# Patient Record
Sex: FEMALE | Race: WHITE | NOT HISPANIC OR LATINO | URBAN - METROPOLITAN AREA
[De-identification: names, ages, dates, MRNs, and addresses within clinical notes are randomized per-mention and may not be internally consistent; named-entity substitution may affect disease eponyms.]

---

## 2017-01-16 VITALS
SYSTOLIC BLOOD PRESSURE: 168 MMHG | WEIGHT: 197.53 LBS | RESPIRATION RATE: 16 BRPM | DIASTOLIC BLOOD PRESSURE: 77 MMHG | OXYGEN SATURATION: 96 % | HEART RATE: 66 BPM | HEIGHT: 64.5 IN | TEMPERATURE: 98 F

## 2017-01-16 NOTE — ASU PATIENT PROFILE, ADULT - TEACHING/LEARNING LEARNING PREFERENCES
verbal instruction/individual instruction verbal instruction/individual instruction/written material

## 2017-01-17 ENCOUNTER — INPATIENT (INPATIENT)
Facility: HOSPITAL | Age: 77
LOS: 5 days | Discharge: HOME | DRG: 460 | End: 2017-01-23
Payer: COMMERCIAL

## 2017-01-17 ENCOUNTER — TRANSCRIPTION ENCOUNTER (OUTPATIENT)
Age: 77
End: 2017-01-17

## 2017-01-17 DIAGNOSIS — Z98.890 OTHER SPECIFIED POSTPROCEDURAL STATES: Chronic | ICD-10-CM

## 2017-01-17 LAB
ANION GAP SERPL CALC-SCNC: 3 MMOL/L — LOW (ref 9–16)
BASE EXCESS BLDA CALC-SCNC: 1.5 MMOL/L — SIGNIFICANT CHANGE UP (ref -2–3)
BASOPHILS NFR BLD AUTO: 0.1 % — SIGNIFICANT CHANGE UP (ref 0–2)
BUN SERPL-MCNC: 13 MG/DL — SIGNIFICANT CHANGE UP (ref 7–23)
CA-I BLDA-SCNC: 1.13 MMOL/L — SIGNIFICANT CHANGE UP (ref 1.1–1.3)
CALCIUM SERPL-MCNC: 8 MG/DL — LOW (ref 8.5–10.5)
CHLORIDE SERPL-SCNC: 106 MMOL/L — SIGNIFICANT CHANGE UP (ref 96–108)
CO2 SERPL-SCNC: 31 MMOL/L — SIGNIFICANT CHANGE UP (ref 22–31)
COHGB MFR BLDA: 0.7 % — SIGNIFICANT CHANGE UP
CREAT SERPL-MCNC: 0.48 MG/DL — LOW (ref 0.5–1.3)
EOSINOPHIL NFR BLD AUTO: 0.5 % — SIGNIFICANT CHANGE UP (ref 0–6)
GAS PNL BLDA: SIGNIFICANT CHANGE UP
GLUCOSE SERPL-MCNC: 118 MG/DL — HIGH (ref 70–99)
HCO3 BLDA-SCNC: 25 MMOL/L — SIGNIFICANT CHANGE UP (ref 21–28)
HCT VFR BLD CALC: 28.4 % — LOW (ref 34.5–45)
HCT VFR BLD CALC: 28.4 % — LOW (ref 34.5–45)
HGB BLD-MCNC: 9.2 G/DL — LOW (ref 11.5–15.5)
HGB BLD-MCNC: 9.3 G/DL — LOW (ref 11.5–15.5)
HGB BLDA-MCNC: 11.6 G/DL — SIGNIFICANT CHANGE UP (ref 11.5–15.5)
LYMPHOCYTES # BLD AUTO: 18.3 % — SIGNIFICANT CHANGE UP (ref 13–44)
MCHC RBC-ENTMCNC: 26.5 PG — LOW (ref 27–34)
MCHC RBC-ENTMCNC: 26.7 PG — LOW (ref 27–34)
MCHC RBC-ENTMCNC: 32.4 G/DL — SIGNIFICANT CHANGE UP (ref 32–36)
MCHC RBC-ENTMCNC: 32.7 G/DL — SIGNIFICANT CHANGE UP (ref 32–36)
MCV RBC AUTO: 81.6 FL — SIGNIFICANT CHANGE UP (ref 80–100)
MCV RBC AUTO: 81.8 FL — SIGNIFICANT CHANGE UP (ref 80–100)
METHGB MFR BLDA: 0.1 % — SIGNIFICANT CHANGE UP
MONOCYTES NFR BLD AUTO: 7.9 % — SIGNIFICANT CHANGE UP (ref 2–14)
NEUTROPHILS NFR BLD AUTO: 73.2 % — SIGNIFICANT CHANGE UP (ref 43–77)
O2 CT VFR BLDA CALC: 16.3 ML/DL — SIGNIFICANT CHANGE UP (ref 15–23)
OXYHGB MFR BLDA: 98 % — SIGNIFICANT CHANGE UP (ref 94–100)
PCO2 BLDA: 36 MMHG — SIGNIFICANT CHANGE UP (ref 32–45)
PH BLDA: 7.46 — HIGH (ref 7.35–7.45)
PLATELET # BLD AUTO: 207 K/UL — SIGNIFICANT CHANGE UP (ref 150–400)
PLATELET # BLD AUTO: 209 K/UL — SIGNIFICANT CHANGE UP (ref 150–400)
PO2 BLDA: 150 MMHG — HIGH (ref 83–108)
POTASSIUM BLDA-SCNC: 3.6 MMOL/L — SIGNIFICANT CHANGE UP (ref 3.5–4.9)
POTASSIUM SERPL-MCNC: 3.7 MMOL/L — SIGNIFICANT CHANGE UP (ref 3.5–5.3)
POTASSIUM SERPL-SCNC: 3.7 MMOL/L — SIGNIFICANT CHANGE UP (ref 3.5–5.3)
RBC # BLD: 3.47 M/UL — LOW (ref 3.8–5.2)
RBC # BLD: 3.48 M/UL — LOW (ref 3.8–5.2)
RBC # FLD: 14.6 % — SIGNIFICANT CHANGE UP (ref 10.3–16.9)
RBC # FLD: 15.2 % — SIGNIFICANT CHANGE UP (ref 10.3–16.9)
SAO2 % BLDA: 99 % — SIGNIFICANT CHANGE UP (ref 95–100)
SODIUM BLDA-SCNC: 137 MMOL/L — LOW (ref 138–146)
SODIUM SERPL-SCNC: 140 MMOL/L — SIGNIFICANT CHANGE UP (ref 135–145)
WBC # BLD: 6.7 K/UL — SIGNIFICANT CHANGE UP (ref 3.8–10.5)
WBC # BLD: 9.2 K/UL — SIGNIFICANT CHANGE UP (ref 3.8–10.5)
WBC # FLD AUTO: 6.7 K/UL — SIGNIFICANT CHANGE UP (ref 3.8–10.5)
WBC # FLD AUTO: 9.2 K/UL — SIGNIFICANT CHANGE UP (ref 3.8–10.5)

## 2017-01-17 RX ORDER — HYDROMORPHONE HYDROCHLORIDE 2 MG/ML
1 INJECTION INTRAMUSCULAR; INTRAVENOUS; SUBCUTANEOUS EVERY 4 HOURS
Qty: 0 | Refills: 0 | Status: DISCONTINUED | OUTPATIENT
Start: 2017-01-17 | End: 2017-01-17

## 2017-01-17 RX ORDER — DOCUSATE SODIUM 100 MG
100 CAPSULE ORAL THREE TIMES A DAY
Qty: 0 | Refills: 0 | Status: DISCONTINUED | OUTPATIENT
Start: 2017-01-17 | End: 2017-01-23

## 2017-01-17 RX ORDER — ONDANSETRON 8 MG/1
4 TABLET, FILM COATED ORAL EVERY 4 HOURS
Qty: 0 | Refills: 0 | Status: DISCONTINUED | OUTPATIENT
Start: 2017-01-17 | End: 2017-01-18

## 2017-01-17 RX ORDER — SENNA PLUS 8.6 MG/1
2 TABLET ORAL AT BEDTIME
Qty: 0 | Refills: 0 | Status: DISCONTINUED | OUTPATIENT
Start: 2017-01-17 | End: 2017-01-23

## 2017-01-17 RX ORDER — SODIUM CHLORIDE 9 MG/ML
1000 INJECTION, SOLUTION INTRAVENOUS
Qty: 0 | Refills: 0 | Status: DISCONTINUED | OUTPATIENT
Start: 2017-01-17 | End: 2017-01-23

## 2017-01-17 RX ORDER — CEFAZOLIN SODIUM 1 G
2000 VIAL (EA) INJECTION EVERY 8 HOURS
Qty: 0 | Refills: 0 | Status: COMPLETED | OUTPATIENT
Start: 2017-01-17 | End: 2017-01-18

## 2017-01-17 RX ORDER — METOPROLOL TARTRATE 50 MG
5 TABLET ORAL DAILY
Qty: 0 | Refills: 0 | Status: DISCONTINUED | OUTPATIENT
Start: 2017-01-17 | End: 2017-01-17

## 2017-01-17 RX ORDER — MAGNESIUM HYDROXIDE 400 MG/1
30 TABLET, CHEWABLE ORAL EVERY 12 HOURS
Qty: 0 | Refills: 0 | Status: DISCONTINUED | OUTPATIENT
Start: 2017-01-17 | End: 2017-01-23

## 2017-01-17 RX ORDER — NEBIVOLOL HYDROCHLORIDE 5 MG/1
5 TABLET ORAL DAILY
Qty: 0 | Refills: 0 | Status: DISCONTINUED | OUTPATIENT
Start: 2017-01-17 | End: 2017-01-23

## 2017-01-17 RX ORDER — HYDROMORPHONE HYDROCHLORIDE 2 MG/ML
0.5 INJECTION INTRAMUSCULAR; INTRAVENOUS; SUBCUTANEOUS
Qty: 0 | Refills: 0 | Status: DISCONTINUED | OUTPATIENT
Start: 2017-01-17 | End: 2017-01-23

## 2017-01-17 RX ORDER — HYDROMORPHONE HYDROCHLORIDE 2 MG/ML
0.5 INJECTION INTRAMUSCULAR; INTRAVENOUS; SUBCUTANEOUS
Qty: 0 | Refills: 0 | Status: COMPLETED | OUTPATIENT
Start: 2017-01-17 | End: 2017-01-17

## 2017-01-17 RX ADMIN — HYDROMORPHONE HYDROCHLORIDE 0.5 MILLIGRAM(S): 2 INJECTION INTRAMUSCULAR; INTRAVENOUS; SUBCUTANEOUS at 21:48

## 2017-01-17 RX ADMIN — HYDROMORPHONE HYDROCHLORIDE 0.5 MILLIGRAM(S): 2 INJECTION INTRAMUSCULAR; INTRAVENOUS; SUBCUTANEOUS at 22:00

## 2017-01-17 RX ADMIN — Medication 100 MILLIGRAM(S): at 21:09

## 2017-01-17 NOTE — DISCHARGE NOTE ADULT - ADDITIONAL INSTRUCTIONS
No strenuous activity, heavy lifting, driving, tub bathing, or returning to work until cleared by MD.  You may shower  Change dressing daily.  Remove dressing after post op day 5, then leave incision open to air.  Follow up with Dr. Mayo in his office in 2 weeks.  Any staples/sutures will be removed in 2 weeks.   If you don't have a bowel movement by post op day 3, then take Milk of Magnesia (over the counter).  If no bowel movement by at least post op day 5, then use a Dulcolax suppository (over the counter) and/or a Fleets enema--if still no bowel movement, call your MD.  Contact your doctor if you experience: fever greater than 101.5, chills, chest pain, difficulty breathing, bleeding, redness or heat around the incision.    Please follow up with your primary care provider.

## 2017-01-17 NOTE — DISCHARGE NOTE ADULT - HOSPITAL COURSE
Admitted  Surgery -   Perioperative abx  Pain control  DVT ppx Admitted  Surgery - 1/17/17 L3 to L5 lami PSF  Perioperative abx  Pain control  DVT ppx Admitted 1/17/17  Surgery - 1/17/17 L3 to L5 lami PSF  Perioperative abx  Pain control  DVT ppx

## 2017-01-17 NOTE — DISCHARGE NOTE ADULT - CARE PLAN
Principal Discharge DX:	Spinal stenosis  Goal:	improvement after surgery  Instructions for follow-up, activity and diet:	see below Principal Discharge DX:	Spinal stenosis of lumbar region  Goal:	improvement after surgery 1/17/16- L3-L5 laminectomy, posterior spinal fusion, L3 Kyphoplasty  Instructions for follow-up, activity and diet:	see below

## 2017-01-17 NOTE — DISCHARGE NOTE ADULT - NS AS ACTIVITY OBS
Do not make important decisions/Showering allowed/No Heavy lifting/straining/Do not drive or operate machinery

## 2017-01-17 NOTE — DISCHARGE NOTE ADULT - PATIENT PORTAL LINK FT
“You can access the [employer] Patient Portal, offered by [facility], by registering with the following website: [email or web address]”

## 2017-01-17 NOTE — DISCHARGE NOTE ADULT - MEDICATION SUMMARY - MEDICATIONS TO TAKE
I will START or STAY ON the medications listed below when I get home from the hospital:    acetaminophen-oxyCODONE 325 mg-5 mg oral tablet  -- 1 to 2 tab(s) by mouth every 4 to 6 hours, as needed, pain MDD:8  -- Caution federal law prohibits the transfer of this drug to any person other  than the person for whom it was prescribed.  May cause drowsiness.  Alcohol may intensify this effect.  Use care when operating dangerous machinery.  This prescription cannot be refilled.  This product contains acetaminophen.  Do not use  with any other product containing acetaminophen to prevent possible liver damage.  Using more of this medication than prescribed may cause serious breathing problems.    -- Indication: For moderate to severe pain    Bystolic 5 mg oral tablet  -- 1 tab(s) by mouth once a day  -- Indication: For HTN (hypertension)    docusate sodium 100 mg oral capsule  -- 1 cap(s) by mouth 3 times a day  -- Indication: For constipation    senna oral tablet  -- 2 tab(s) by mouth once a day (at bedtime)  -- Indication: For constipation

## 2017-01-17 NOTE — DISCHARGE NOTE ADULT - CARE PROVIDER_API CALL
Wily Mayo), Orthopaedic Surgery  [address] [place] [address]  Phone: [phone]  Fax: [phone]

## 2017-01-17 NOTE — DISCHARGE NOTE ADULT - PLAN OF CARE
improvement after surgery see below improvement after surgery 1/17/16- L3-L5 laminectomy, posterior spinal fusion, L3 Kyphoplasty

## 2017-01-18 DIAGNOSIS — M48.06 SPINAL STENOSIS, LUMBAR REGION: ICD-10-CM

## 2017-01-18 LAB
ANION GAP SERPL CALC-SCNC: 4 MMOL/L — LOW (ref 9–16)
BASOPHILS NFR BLD AUTO: 0.1 % — SIGNIFICANT CHANGE UP (ref 0–2)
BUN SERPL-MCNC: 12 MG/DL — SIGNIFICANT CHANGE UP (ref 7–23)
CALCIUM SERPL-MCNC: 7.9 MG/DL — LOW (ref 8.5–10.5)
CHLORIDE SERPL-SCNC: 103 MMOL/L — SIGNIFICANT CHANGE UP (ref 96–108)
CO2 SERPL-SCNC: 31 MMOL/L — SIGNIFICANT CHANGE UP (ref 22–31)
CREAT SERPL-MCNC: 0.45 MG/DL — LOW (ref 0.5–1.3)
EOSINOPHIL NFR BLD AUTO: 0.1 % — SIGNIFICANT CHANGE UP (ref 0–6)
GLUCOSE SERPL-MCNC: 148 MG/DL — HIGH (ref 70–99)
HCT VFR BLD CALC: 28.2 % — LOW (ref 34.5–45)
HGB BLD-MCNC: 9.1 G/DL — LOW (ref 11.5–15.5)
LYMPHOCYTES # BLD AUTO: 8.7 % — LOW (ref 13–44)
MCHC RBC-ENTMCNC: 26.8 PG — LOW (ref 27–34)
MCHC RBC-ENTMCNC: 32.3 G/DL — SIGNIFICANT CHANGE UP (ref 32–36)
MCV RBC AUTO: 82.9 FL — SIGNIFICANT CHANGE UP (ref 80–100)
MONOCYTES NFR BLD AUTO: 7.7 % — SIGNIFICANT CHANGE UP (ref 2–14)
NEUTROPHILS NFR BLD AUTO: 83.4 % — HIGH (ref 43–77)
PLATELET # BLD AUTO: 193 K/UL — SIGNIFICANT CHANGE UP (ref 150–400)
POTASSIUM SERPL-MCNC: 4.1 MMOL/L — SIGNIFICANT CHANGE UP (ref 3.5–5.3)
POTASSIUM SERPL-SCNC: 4.1 MMOL/L — SIGNIFICANT CHANGE UP (ref 3.5–5.3)
RBC # BLD: 3.4 M/UL — LOW (ref 3.8–5.2)
RBC # FLD: 15.1 % — SIGNIFICANT CHANGE UP (ref 10.3–16.9)
SODIUM SERPL-SCNC: 138 MMOL/L — SIGNIFICANT CHANGE UP (ref 135–145)
WBC # BLD: 10.5 K/UL — SIGNIFICANT CHANGE UP (ref 3.8–10.5)
WBC # FLD AUTO: 10.5 K/UL — SIGNIFICANT CHANGE UP (ref 3.8–10.5)

## 2017-01-18 RX ORDER — METOCLOPRAMIDE HCL 10 MG
10 TABLET ORAL EVERY 8 HOURS
Qty: 0 | Refills: 0 | Status: DISCONTINUED | OUTPATIENT
Start: 2017-01-18 | End: 2017-01-23

## 2017-01-18 RX ADMIN — Medication 100 MILLIGRAM(S): at 09:00

## 2017-01-18 RX ADMIN — Medication 100 MILLIGRAM(S): at 21:21

## 2017-01-18 RX ADMIN — HYDROMORPHONE HYDROCHLORIDE 0.5 MILLIGRAM(S): 2 INJECTION INTRAMUSCULAR; INTRAVENOUS; SUBCUTANEOUS at 02:33

## 2017-01-18 RX ADMIN — Medication 100 MILLIGRAM(S): at 14:10

## 2017-01-18 RX ADMIN — NEBIVOLOL HYDROCHLORIDE 5 MILLIGRAM(S): 5 TABLET ORAL at 06:24

## 2017-01-18 RX ADMIN — HYDROMORPHONE HYDROCHLORIDE 0.5 MILLIGRAM(S): 2 INJECTION INTRAMUSCULAR; INTRAVENOUS; SUBCUTANEOUS at 02:23

## 2017-01-18 RX ADMIN — Medication 100 MILLIGRAM(S): at 05:00

## 2017-01-18 RX ADMIN — SENNA PLUS 2 TABLET(S): 8.6 TABLET ORAL at 21:21

## 2017-01-18 NOTE — PROGRESS NOTE ADULT - SUBJECTIVE AND OBJECTIVE BOX
Patient seen and examined at bedside.     SUBJECTIVE: No acute events overnight.  Pain tolerable.    Denies Chest Pain/SOB.    Denies Headache.    Denies Nausea/vomiting.      OBJECTIVE:   T(C): 36.8, Max: 36.9 (01-17 @ 16:30)  T(F): 98.2, Max: 98.5 (01-17 @ 16:30)  HR: 68 (56 - 73)  BP: 133/62 (112/49 - 175/62)  RR:  (12 - 18)  SpO2:  (95% - 100%)  Wt(kg): --    NAD, non labored respirations  Affected extremity:          Dressing: clean/dry/intact          Drains: none         Sensation: [x ] intact to light touch  [ ] decreased                              Vascular: [x ] warm well perfused; capillary refill <3 seconds          Motor exam: [x ]         [ ] Upper extremity                   Bhavya (c5)   Bi(c5/6)   WE(c6)   EE(c7)    (c8)                                                R           5              5            5             5             5                                               L            5              5            5             5            5         [x ] Lower extremity                   IP(L2)     Q(L3)     TA(L4)     EHL(L5)     GS(s1)                                                 R          5           5          5            5              5                                               L           5           5         5             5              5                                     9.1<L>  10.5  )-------------------( 193      ( 01-18 @ 03:21 )                 28.2<L>    I&O's Detail      A/P :  76y Female s/p Lumbar Laminectomy/PSF L3-4, L4-5 & L3 Kyphoplasty     -    Pain control + bowel regimen  -    DVT ppx: SCDs    -    Periop abx    -    ADAT  -    Check AM labs  -    Weight bearing status:   WBAT        -    Physical Therapy  -    Resume home meds  -    Dispo planning

## 2017-01-18 NOTE — PHYSICAL THERAPY INITIAL EVALUATION ADULT - PLANNED THERAPY INTERVENTIONS, PT EVAL
bed mobility training/gait training/strengthening/balance training/lumbar stabilization/transfer training

## 2017-01-18 NOTE — H&P ADULT. - HISTORY OF PRESENT ILLNESS
76F  c/o back pain worsened over time without improvement. Denies numbness, tingling. Ambulates without assist. Presents today for elective PSF L3-5.

## 2017-01-18 NOTE — PROGRESS NOTE ADULT - SUBJECTIVE AND OBJECTIVE BOX
ORTHO NOTE    [X ] Pt seen/examined.  [ ] Pt without any complaints/in NAD.    [X ] Pt complains of:  Incisional pain - meds help.        ROS: [ ] Fever  [ ] Chills  [ ] CP [ ] SOB [ ] Dysnea  [ ] Palpitations [ ] Cough [ ] N/V/C/D [ ] Paresthia [ ] Other     [X ] ROS  otherwise negative    .    PHYSICAL EXAM:    Vital Signs Last 24 Hrs  T(C): 36.8, Max: 36.9 (01-17 @ 16:30)  T(F): 98.2, Max: 98.5 (01-17 @ 16:30)  HR: 68 (56 - 73)  BP: 133/62 (112/49 - 175/62)  BP(mean): --  RR: 15 (12 - 18)  SpO2: 99% (95% - 100%)    I&O's Detail       CAPILLARY BLOOD GLUCOSE  98 (17 Jan 2017 15:37)                  Neuro:    Lungs:    CV:    ABD:     Ext:    LABS                        9.1    10.5  )-----------( 193      ( 18 Jan 2017 03:21 )             28.2                                18 Jan 2017 03:21    138    |  103    |  12     ----------------------------<  148    4.1     |  31     |  0.45     Ca    7.9        18 Jan 2017 03:21        [ ] Other Labs  [ ] None ordered            Please check or [place] when present:  •  Heart Failure:    [ ] Acute        [ ]  Acute on Chronic        [ ] Chronic         [ ] Diastolic     [ ]  Combined    •  KARLA:     [ ] ATN        [ ]  Renal medullary necrosis       [ ]  Renal cortical necrosis                  [ ] Other pathological Lesion:  •  CKD:  [ ] Stage I   [ ] Stage II  [ ] Stage III    [ ]Stage IV   [ ]  CKD V   [ ]  Other/Unspecified:    •  Abdominal Nutritional Status:   [ ] Malnutrition-See Nutrition note    [ ] Cachexia   [ ]  Other        [ ] Supplement ordered:            [ ] Morbid Obesity: BMI >=40         ASSESSMENT/PLAN:      STATUS POST: L3 to L5 lami psf pod 1    CONTINUE:          [ ] PT wbat    [ ] DVT PPX- scd    [ ] Pain Mgt con't current regimen    [ ] Dispo plan- pending PT recs ORTHO NOTE    [X ] Pt seen/examined in PACU at 8:45 AM.  [ ] Pt without any complaints/in NAD.    [X ] Pt complains of:  Incisional pain - meds help.  Legs feel okay.       ROS: [ ] Fever  [ ] Chills  [ ] CP [ ] SOB [ ] Dysnea  [ ] Palpitations [ ] Cough [ ] N/V/C/D [ ] Paresthia [ ] Other     [X ] ROS  otherwise negative    .    PHYSICAL EXAM:    Vital Signs Last 24 Hrs  T(C): 36.8, Max: 36.9 (01-17 @ 16:30)  T(F): 98.2, Max: 98.5 (01-17 @ 16:30)  HR: 68 (56 - 73)  BP: 133/62 (112/49 - 175/62)  BP(mean): --  RR: 15 (12 - 18)  SpO2: 99% (95% - 100%)    I&O's Detail       CAPILLARY BLOOD GLUCOSE  98 (17 Jan 2017 15:37)                  Neuro:  NAD A and O x 3    Lungs:    CV:    ABD: soft nt/nd  + BS    Ext:  LE strength 5/5 nvid b/l    2 HV in place, + Roy    LABS                        9.1    10.5  )-----------( 193      ( 18 Jan 2017 03:21 )             28.2                                18 Jan 2017 03:21    138    |  103    |  12     ----------------------------<  148    4.1     |  31     |  0.45     Ca    7.9        18 Jan 2017 03:21        [ ] Other Labs  [ ] None ordered            Please check or [place] when present:  •  Heart Failure:    [ ] Acute        [ ]  Acute on Chronic        [ ] Chronic         [ ] Diastolic     [ ]  Combined    •  KARLA:     [ ] ATN        [ ]  Renal medullary necrosis       [ ]  Renal cortical necrosis                  [ ] Other pathological Lesion:  •  CKD:  [ ] Stage I   [ ] Stage II  [ ] Stage III    [ ]Stage IV   [ ]  CKD V   [ ]  Other/Unspecified:    •  Abdominal Nutritional Status:   [ ] Malnutrition-See Nutrition note    [ ] Cachexia   [ ]  Other        [ ] Supplement ordered:            [ ] Morbid Obesity: BMI >=40         ASSESSMENT/PLAN:      STATUS POST: L3 to L5 lami psf pod 1    CONTINUE:          [ ] PT wbat    [ ] DVT PPX- scd    [ ] Pain Mgt con't current regimen    [ ] Dispo plan- pending PT recs    Con't HVs.  Void trial.  Refuses to use CPAP for [place], didn't use it last night but there was no desat as per PACU RN.  IS use.  Bowel regimen.

## 2017-01-18 NOTE — PHYSICAL THERAPY INITIAL EVALUATION ADULT - ADDITIONAL COMMENTS
[place] with [place] access. Patient has a previous tolerance of ambulating 2 city blocks, before needing to rest. Patient does not ambulate with a AD.

## 2017-01-18 NOTE — PHYSICAL THERAPY INITIAL EVALUATION ADULT - IMPAIRMENTS FOUND, PT EVAL
aerobic capacity/endurance/gait, locomotion, and balance/muscle strength/ergonomics and body mechanics

## 2017-01-19 DIAGNOSIS — G47.30 SLEEP APNEA, UNSPECIFIED: ICD-10-CM

## 2017-01-19 DIAGNOSIS — D50.0 IRON DEFICIENCY ANEMIA SECONDARY TO BLOOD LOSS (CHRONIC): ICD-10-CM

## 2017-01-19 DIAGNOSIS — I10 ESSENTIAL (PRIMARY) HYPERTENSION: ICD-10-CM

## 2017-01-19 LAB
ANION GAP SERPL CALC-SCNC: 3 MMOL/L — LOW (ref 9–16)
APPEARANCE UR: CLEAR — SIGNIFICANT CHANGE UP
APPEARANCE UR: CLEAR — SIGNIFICANT CHANGE UP
BACTERIA # UR AUTO: PRESENT /HPF
BASOPHILS NFR BLD AUTO: 0.1 % — SIGNIFICANT CHANGE UP (ref 0–2)
BILIRUB UR-MCNC: NEGATIVE — SIGNIFICANT CHANGE UP
BILIRUB UR-MCNC: NEGATIVE — SIGNIFICANT CHANGE UP
BUN SERPL-MCNC: 9 MG/DL — SIGNIFICANT CHANGE UP (ref 7–23)
CALCIUM SERPL-MCNC: 8.4 MG/DL — LOW (ref 8.5–10.5)
CHLORIDE SERPL-SCNC: 101 MMOL/L — SIGNIFICANT CHANGE UP (ref 96–108)
CO2 SERPL-SCNC: 33 MMOL/L — HIGH (ref 22–31)
COLOR SPEC: YELLOW — SIGNIFICANT CHANGE UP
COLOR SPEC: YELLOW — SIGNIFICANT CHANGE UP
CREAT SERPL-MCNC: 0.48 MG/DL — LOW (ref 0.5–1.3)
DIFF PNL FLD: (no result)
DIFF PNL FLD: NEGATIVE — SIGNIFICANT CHANGE UP
EOSINOPHIL NFR BLD AUTO: 0.4 % — SIGNIFICANT CHANGE UP (ref 0–6)
EPI CELLS # UR: SIGNIFICANT CHANGE UP /HPF
GLUCOSE SERPL-MCNC: 126 MG/DL — HIGH (ref 70–99)
GLUCOSE UR QL: NEGATIVE — SIGNIFICANT CHANGE UP
GLUCOSE UR QL: NEGATIVE — SIGNIFICANT CHANGE UP
HCT VFR BLD CALC: 25.1 % — LOW (ref 34.5–45)
HCT VFR BLD CALC: 26.2 % — LOW (ref 34.5–45)
HGB BLD-MCNC: 8 G/DL — LOW (ref 11.5–15.5)
HGB BLD-MCNC: 8.2 G/DL — LOW (ref 11.5–15.5)
KETONES UR-MCNC: (no result) MG/DL
KETONES UR-MCNC: NEGATIVE — SIGNIFICANT CHANGE UP
LEUKOCYTE ESTERASE UR-ACNC: NEGATIVE — SIGNIFICANT CHANGE UP
LEUKOCYTE ESTERASE UR-ACNC: NEGATIVE — SIGNIFICANT CHANGE UP
LYMPHOCYTES # BLD AUTO: 6.4 % — LOW (ref 13–44)
MAGNESIUM SERPL-MCNC: 1.9 MG/DL — SIGNIFICANT CHANGE UP (ref 1.6–2.4)
MCHC RBC-ENTMCNC: 26.1 PG — LOW (ref 27–34)
MCHC RBC-ENTMCNC: 26.6 PG — LOW (ref 27–34)
MCHC RBC-ENTMCNC: 31.3 G/DL — LOW (ref 32–36)
MCHC RBC-ENTMCNC: 31.9 G/DL — LOW (ref 32–36)
MCV RBC AUTO: 83.4 FL — SIGNIFICANT CHANGE UP (ref 80–100)
MCV RBC AUTO: 83.4 FL — SIGNIFICANT CHANGE UP (ref 80–100)
MONOCYTES NFR BLD AUTO: 9.3 % — SIGNIFICANT CHANGE UP (ref 2–14)
NEUTROPHILS NFR BLD AUTO: 83.8 % — HIGH (ref 43–77)
NITRITE UR-MCNC: NEGATIVE — SIGNIFICANT CHANGE UP
NITRITE UR-MCNC: NEGATIVE — SIGNIFICANT CHANGE UP
PH UR: 5.5 — SIGNIFICANT CHANGE UP (ref 4–8)
PH UR: 6 — SIGNIFICANT CHANGE UP (ref 4–8)
PLATELET # BLD AUTO: 167 K/UL — SIGNIFICANT CHANGE UP (ref 150–400)
PLATELET # BLD AUTO: 183 K/UL — SIGNIFICANT CHANGE UP (ref 150–400)
POTASSIUM SERPL-MCNC: 3.7 MMOL/L — SIGNIFICANT CHANGE UP (ref 3.5–5.3)
POTASSIUM SERPL-SCNC: 3.7 MMOL/L — SIGNIFICANT CHANGE UP (ref 3.5–5.3)
PROT UR-MCNC: NEGATIVE MG/DL — SIGNIFICANT CHANGE UP
PROT UR-MCNC: NEGATIVE MG/DL — SIGNIFICANT CHANGE UP
RBC # BLD: 3.01 M/UL — LOW (ref 3.8–5.2)
RBC # BLD: 3.14 M/UL — LOW (ref 3.8–5.2)
RBC # FLD: 15.3 % — SIGNIFICANT CHANGE UP (ref 10.3–16.9)
RBC # FLD: 15.3 % — SIGNIFICANT CHANGE UP (ref 10.3–16.9)
RBC CASTS # UR COMP ASSIST: > 10 /HPF
SODIUM SERPL-SCNC: 137 MMOL/L — SIGNIFICANT CHANGE UP (ref 135–145)
SP GR SPEC: 1.02 — SIGNIFICANT CHANGE UP (ref 1–1.03)
SP GR SPEC: 1.02 — SIGNIFICANT CHANGE UP (ref 1–1.03)
UROBILINOGEN FLD QL: 0.2 E.U./DL — SIGNIFICANT CHANGE UP
UROBILINOGEN FLD QL: 0.2 E.U./DL — SIGNIFICANT CHANGE UP
WBC # BLD: 10.1 K/UL — SIGNIFICANT CHANGE UP (ref 3.8–10.5)
WBC # BLD: 9.8 K/UL — SIGNIFICANT CHANGE UP (ref 3.8–10.5)
WBC # FLD AUTO: 10.1 K/UL — SIGNIFICANT CHANGE UP (ref 3.8–10.5)
WBC # FLD AUTO: 9.8 K/UL — SIGNIFICANT CHANGE UP (ref 3.8–10.5)
WBC UR QL: < 5 /HPF — SIGNIFICANT CHANGE UP

## 2017-01-19 PROCEDURE — 71010: CPT | Mod: 26

## 2017-01-19 PROCEDURE — 99222 1ST HOSP IP/OBS MODERATE 55: CPT | Mod: GC

## 2017-01-19 RX ADMIN — Medication 100 MILLIGRAM(S): at 22:45

## 2017-01-19 RX ADMIN — Medication 100 MILLIGRAM(S): at 14:21

## 2017-01-19 RX ADMIN — SENNA PLUS 2 TABLET(S): 8.6 TABLET ORAL at 22:45

## 2017-01-19 RX ADMIN — Medication 100 MILLIGRAM(S): at 06:13

## 2017-01-19 RX ADMIN — NEBIVOLOL HYDROCHLORIDE 5 MILLIGRAM(S): 5 TABLET ORAL at 06:14

## 2017-01-19 NOTE — PROGRESS NOTE ADULT - SUBJECTIVE AND OBJECTIVE BOX
ORTHO NOTE    [X ] Pt seen/examined.  [ ] Pt without any complaints/in NAD.    [X ] Pt complains of:  Incisional pain.  Did better with PT today.  O2 desat at times to 89% on RA, came up to 96% with n/c and IS use.      ROS: [ ] Fever  [ ] Chills  [ ] CP [ ] SOB [ ] Dysnea  [ ] Palpitations [ ] Cough [ ] N/V/C/D [ ] Paresthia [ ] Other     [X ] ROS  otherwise negative    .    PHYSICAL EXAM:    Vital Signs Last 24 Hrs  T(C): 36.6, Max: 36.9 (01-19 @ 05:34)  T(F): 97.9, Max: 98.5 (01-19 @ 05:34)  HR: 83 (72 - 83)  BP: 134/78 (134/78 - 144/62)  BP(mean): --  RR: 15 (15 - 17)  SpO2: 96% (95% - 100%)    I&O's Detail       CAPILLARY BLOOD GLUCOSE                  Neuro:  NAD A and O x 3    Lungs: CTA b/l    CV: RRR    ABD: soft nt/nd +BS    Ext:  LE strength 5/5 nvid b/l    Back dsg saturated, 2 HV in place    LABS                        8.0    10.1  )-----------( 167      ( 19 Jan 2017 11:39 )             25.1                                19 Jan 2017 08:30    137    |  101    |  9      ----------------------------<  126    3.7     |  33     |  0.48     Ca    8.4        19 Jan 2017 08:30  Mg     1.9       19 Jan 2017 08:30        [ ] Other Labs  [ ] None ordered            Please check or [place] when present:  •  Heart Failure:    [ ] Acute        [ ]  Acute on Chronic        [ ] Chronic         [ ] Diastolic     [ ]  Combined    •  KARLA:     [ ] ATN        [ ]  Renal medullary necrosis       [ ]  Renal cortical necrosis                  [ ] Other pathological Lesion:  •  CKD:  [ ] Stage I   [ ] Stage II  [ ] Stage III    [ ]Stage IV   [ ]  CKD V   [ ]  Other/Unspecified:    •  Abdominal Nutritional Status:   [ ] Malnutrition-See Nutrition note    [ ] Cachexia   [ ]  Other        [ ] Supplement ordered:            [ ] Morbid Obesity: BMI >=40         ASSESSMENT/PLAN:      STATUS POST: L3 to L5 lami PSF pod 2    CONTINUE:          [ ] PT wbat    [ ] DVT PPX- scd    [ ] Pain Mgt con't current regimen    [ ] Dispo plan- home     Did a dressing change.  Monitor h/h, running on lower end but asymptomatic with PT.  IS use.  Bowel regimen.  Con't HVs.  CXR showed small atelectasis L lung base.  O2 n/c prn.  Using cpap at night.  Dr Edouard to see for medicine.

## 2017-01-19 NOTE — CONSULT NOTE ADULT - PROBLEM SELECTOR RECOMMENDATION 9
she is doing well and she slept with the cpap and tolerated well.  The base line oxygen saturation is normal.  Avoid over sedation

## 2017-01-19 NOTE — PROGRESS NOTE ADULT - SUBJECTIVE AND OBJECTIVE BOX
Patient seen and examined at bedside.     SUBJECTIVE: No acute events overnight.  Pain tolerable.    Denies Chest Pain/SOB.    Denies Headache.    Denies Nausea/vomiting.      OBJECTIVE:   Vital Signs Last 24 Hrs  T(C): 36.9, Max: 36.9 (01-18 @ 08:52)  T(F): 98.5, Max: 98.5 (01-19 @ 05:34)  HR: 82 (59 - 82)  BP: 135/82 (118/66 - 150/56)  BP(mean): --  RR: 17 (12 - 17)  SpO2: 95% (95% - 100%)      NAD, non labored respirations  Affected extremity:          Dressing: clean/dry/intact          Drains: none         Sensation: [x ] intact to light touch  [ ] decreased                              Vascular: [x ] warm well perfused; capillary refill <3 seconds          Motor exam: [x ]         [ ] Upper extremity                   Bhavya (c5)   Bi(c5/6)   WE(c6)   EE(c7)    (c8)                                                R           5              5            5             5             5                                               L            5              5            5             5            5         [x ] Lower extremity                   IP(L2)     Q(L3)     TA(L4)     EHL(L5)     GS(s1)                                                 R          5           5          5            5              5                                               L           5           5         5             5              5                        I&O's Summary  I & Os for 24h ending 18 Jan 2017 07:00  =============================================  IN: 1100 ml / OUT: 1435 ml / NET: -335 ml    I & Os for current day (as of 19 Jan 2017 06:20)  =============================================  IN: 1340 ml / OUT: 1895 ml / NET: -555 ml        A/P :  76y Female s/p Lumbar Laminectomy/PSF L3-4, L4-5 & L3 Kyphoplasty 1/17/17  -    Pain control + bowel regimen  -    DVT ppx: SCDs    -    Periop abx    -    ADAT  -    Check AM labs  -    Weight bearing status:   WBAT        -    Physical Therapy  -    Resume home meds  -    Dispo planning

## 2017-01-20 LAB
ANION GAP SERPL CALC-SCNC: 6 MMOL/L — LOW (ref 9–16)
BASOPHILS NFR BLD AUTO: 0.1 % — SIGNIFICANT CHANGE UP (ref 0–2)
BUN SERPL-MCNC: 10 MG/DL — SIGNIFICANT CHANGE UP (ref 7–23)
CALCIUM SERPL-MCNC: 7.9 MG/DL — LOW (ref 8.5–10.5)
CHLORIDE SERPL-SCNC: 103 MMOL/L — SIGNIFICANT CHANGE UP (ref 96–108)
CO2 SERPL-SCNC: 33 MMOL/L — HIGH (ref 22–31)
CREAT SERPL-MCNC: 0.35 MG/DL — LOW (ref 0.5–1.3)
CULTURE RESULTS: NO GROWTH — SIGNIFICANT CHANGE UP
EOSINOPHIL NFR BLD AUTO: 0.8 % — SIGNIFICANT CHANGE UP (ref 0–6)
GLUCOSE SERPL-MCNC: 114 MG/DL — HIGH (ref 70–99)
HCT VFR BLD CALC: 25.3 % — LOW (ref 34.5–45)
HGB BLD-MCNC: 8.1 G/DL — LOW (ref 11.5–15.5)
LYMPHOCYTES # BLD AUTO: 10.6 % — LOW (ref 13–44)
MCHC RBC-ENTMCNC: 26.9 PG — LOW (ref 27–34)
MCHC RBC-ENTMCNC: 32 G/DL — SIGNIFICANT CHANGE UP (ref 32–36)
MCV RBC AUTO: 84.1 FL — SIGNIFICANT CHANGE UP (ref 80–100)
MONOCYTES NFR BLD AUTO: 11.4 % — SIGNIFICANT CHANGE UP (ref 2–14)
NEUTROPHILS NFR BLD AUTO: 77.1 % — HIGH (ref 43–77)
PLATELET # BLD AUTO: 156 K/UL — SIGNIFICANT CHANGE UP (ref 150–400)
POTASSIUM SERPL-MCNC: 3.5 MMOL/L — SIGNIFICANT CHANGE UP (ref 3.5–5.3)
POTASSIUM SERPL-SCNC: 3.5 MMOL/L — SIGNIFICANT CHANGE UP (ref 3.5–5.3)
RBC # BLD: 3.01 M/UL — LOW (ref 3.8–5.2)
RBC # FLD: 15.5 % — SIGNIFICANT CHANGE UP (ref 10.3–16.9)
SODIUM SERPL-SCNC: 142 MMOL/L — SIGNIFICANT CHANGE UP (ref 135–145)
SPECIMEN SOURCE: SIGNIFICANT CHANGE UP
WBC # BLD: 8.8 K/UL — SIGNIFICANT CHANGE UP (ref 3.8–10.5)
WBC # FLD AUTO: 8.8 K/UL — SIGNIFICANT CHANGE UP (ref 3.8–10.5)

## 2017-01-20 RX ADMIN — Medication 100 MILLIGRAM(S): at 15:39

## 2017-01-20 RX ADMIN — NEBIVOLOL HYDROCHLORIDE 5 MILLIGRAM(S): 5 TABLET ORAL at 05:11

## 2017-01-20 RX ADMIN — Medication 100 MILLIGRAM(S): at 05:11

## 2017-01-20 NOTE — DIETITIAN INITIAL EVALUATION ADULT. - OTHER INFO
77y/o F s/p L3-4-5 Laminectomy PSF, L3 Kyphoplasty. Pt with prior GB sx in 2002, but cannot recall wt loss. PTA pt was following a regular diet and not compliant with vitamin regimen. She c/o decreased appetite post-op due to pain and being uncomfortable. RN informed of current pain. Pt denies GI distress and mechanical issues. Discussed the importance of nutrition post-op and taking vitamins for prior bariatric surgery. Will follow and monitor meal intake and tolerance.

## 2017-01-20 NOTE — PROGRESS NOTE ADULT - SUBJECTIVE AND OBJECTIVE BOX
Patient seen and examined at bedside.     SUBJECTIVE: No acute events overnight.  Pain tolerable.    Denies Chest Pain/SOB.    Denies Headache.    Denies Nausea/vomiting.      OBJECTIVE:   Vital Signs Last 24 Hrs  T(C): 37.2, Max: 37.3 (01-19 @ 15:27)  T(F): 99, Max: 99.1 (01-19 @ 15:27)  HR: 74 (68 - 89)  BP: 149/80 (130/63 - 168/71)  BP(mean): --  RR: 16 (15 - 16)  SpO2: 98% (95% - 98%)      NAD, non labored respirations  Affected extremity:          Dressing: clean/dry/intact          Drains: none         Sensation: [x ] intact to light touch  [ ] decreased                              Vascular: [x ] warm well perfused; capillary refill <3 seconds          Motor exam: [x ]         [ ] Upper extremity                   Bhavya (c5)   Bi(c5/6)   WE(c6)   EE(c7)    (c8)                                                R           5              5            5             5             5                                               L            5              5            5             5            5         [x ] Lower extremity                   IP(L2)     Q(L3)     TA(L4)     EHL(L5)     GS(s1)                                                 R          5           5          5            5              5                                               L           5           5         5             5              5                          I&O's Summary  I & Os for 24h ending 20 Jan 2017 07:00  =============================================  IN: 900 ml / OUT: 830 ml / NET: 70 ml    I & Os for current day (as of 20 Jan 2017 08:47)  =============================================  IN: 0 ml / OUT: 150 ml / NET: -150 ml    Drains: 110/80, 20        A/P :  76y Female s/p Lumbar Laminectomy/PSF L3-4, L4-5 & L3 Kyphoplasty 1/17/17  -    Pain control + bowel regimen  -    DVT ppx: SCDs    -    Periop abx    -    ADAT  -    Check AM labs  -    Weight bearing status:   WBAT        -    Physical Therapy  -    Resume home meds  -    Dispo planning

## 2017-01-20 NOTE — PROGRESS NOTE ADULT - SUBJECTIVE AND OBJECTIVE BOX
ORTHO NOTE    [x ] Pt seen/examined. 9am  [ ] Pt without any complaints/in NAD.    [x ] Pt complains of: incisional pain with movements.     [ ] ROS  otherwise negative    .    PHYSICAL EXAM:    Vital Signs Last 24 Hrs  T(C): 36.8, Max: 37.3 (01-19 @ 15:27)  T(F): 98.3, Max: 99.1 (01-19 @ 15:27)  HR: 79 (68 - 89)  BP: 117/69 (117/69 - 168/71)  BP(mean): --  RR: 17 (16 - 17)  SpO2: 98% (95% - 98%)    I&O's Detail       CAPILLARY BLOOD GLUCOSE                  Neuro: A&0x3    Lungs: Denies SOB    CV: Denies chest pain    ABD: NON distended positive bowel sounds      Ext: NVID Dressing clean dry and intact. Drain x 2      LABS                        8.1    8.8   )-----------( 156      ( 20 Jan 2017 07:11 )             25.3                                20 Jan 2017 07:12    142    |  103    |  10     ----------------------------<  114    3.5     |  33     |  0.35     Ca    7.9        20 Jan 2017 07:12  Mg     1.9       19 Jan 2017 08:30        ASSESSMENT/PLAN:      STATUS POST: PSF L3-5    CONTINUE:          [x ] PT WBAT    [x ] DVT PPX- SCD    x[ ] Pain MgtMEDICATIONS  (PRN):  oxyCODONE  5 mG/acetaminophen 325 mG 1Tablet(s) Oral every 4 hours PRN Moderate Pain  HYDROmorphone  Injectable 0.5milliGRAM(s) IV Push every 3 hours PRN Severe Pain (7 - 10)  oxyCODONE  5 mG/acetaminophen 325 mG 2Tablet(s) Oral every 4 hours PRN Severe Pain (7 - 10)    [x ] Dispo plan- Home

## 2017-01-21 LAB
ANION GAP SERPL CALC-SCNC: 6 MMOL/L — LOW (ref 9–16)
BUN SERPL-MCNC: 11 MG/DL — SIGNIFICANT CHANGE UP (ref 7–23)
CALCIUM SERPL-MCNC: 8.2 MG/DL — LOW (ref 8.5–10.5)
CHLORIDE SERPL-SCNC: 101 MMOL/L — SIGNIFICANT CHANGE UP (ref 96–108)
CO2 SERPL-SCNC: 33 MMOL/L — HIGH (ref 22–31)
CREAT SERPL-MCNC: 0.46 MG/DL — LOW (ref 0.5–1.3)
GLUCOSE SERPL-MCNC: 105 MG/DL — HIGH (ref 70–99)
HCT VFR BLD CALC: 23.9 % — LOW (ref 34.5–45)
HGB BLD-MCNC: 7.6 G/DL — LOW (ref 11.5–15.5)
MCHC RBC-ENTMCNC: 26.9 PG — LOW (ref 27–34)
MCHC RBC-ENTMCNC: 31.8 G/DL — LOW (ref 32–36)
MCV RBC AUTO: 84.5 FL — SIGNIFICANT CHANGE UP (ref 80–100)
PLATELET # BLD AUTO: 178 K/UL — SIGNIFICANT CHANGE UP (ref 150–400)
POTASSIUM SERPL-MCNC: 3.7 MMOL/L — SIGNIFICANT CHANGE UP (ref 3.5–5.3)
POTASSIUM SERPL-SCNC: 3.7 MMOL/L — SIGNIFICANT CHANGE UP (ref 3.5–5.3)
RBC # BLD: 2.83 M/UL — LOW (ref 3.8–5.2)
RBC # FLD: 14.9 % — SIGNIFICANT CHANGE UP (ref 10.3–16.9)
SODIUM SERPL-SCNC: 140 MMOL/L — SIGNIFICANT CHANGE UP (ref 135–145)
WBC # BLD: 7.1 K/UL — SIGNIFICANT CHANGE UP (ref 3.8–10.5)
WBC # FLD AUTO: 7.1 K/UL — SIGNIFICANT CHANGE UP (ref 3.8–10.5)

## 2017-01-21 RX ADMIN — NEBIVOLOL HYDROCHLORIDE 5 MILLIGRAM(S): 5 TABLET ORAL at 07:03

## 2017-01-21 NOTE — PROGRESS NOTE ADULT - ASSESSMENT
A/P :76y Female s/p Lumbar Laminectomy/PSF L3-4, L4-5 & L3 Kyphoplasty 1/17/17  -stable  -pain controlled  -PT/WBAT  -SCD  -diet as tolerated  -f/u labs  -disposition: Home

## 2017-01-21 NOTE — PROGRESS NOTE ADULT - SUBJECTIVE AND OBJECTIVE BOX
S: Patient seen and examined. Doing well this AM. Pain reported but controlled. No acute events overnight.    O:   Vital Signs Last 24 Hrs  T(C): 36.7, Max: 37.2 (01-20 @ 15:12)  T(F): 98.1, Max: 98.9 (01-20 @ 15:12)  HR: 72 (65 - 86)  BP: 145/75 (87/55 - 145/75)  BP(mean): --  RR: 16 (16 - 18)  SpO2: 99% (90% - 99%)    Motor: 5/5 GS/TA/EHL/FHL BL   SILT to patients baseline BL  WWP DP PT BL  Dressing C/D/I

## 2017-01-22 LAB
ANION GAP SERPL CALC-SCNC: 6 MMOL/L — LOW (ref 9–16)
BUN SERPL-MCNC: 14 MG/DL — SIGNIFICANT CHANGE UP (ref 7–23)
CALCIUM SERPL-MCNC: 8.2 MG/DL — LOW (ref 8.5–10.5)
CHLORIDE SERPL-SCNC: 102 MMOL/L — SIGNIFICANT CHANGE UP (ref 96–108)
CO2 SERPL-SCNC: 33 MMOL/L — HIGH (ref 22–31)
CREAT SERPL-MCNC: 0.42 MG/DL — LOW (ref 0.5–1.3)
GLUCOSE SERPL-MCNC: 104 MG/DL — HIGH (ref 70–99)
HCT VFR BLD CALC: 26.8 % — LOW (ref 34.5–45)
HGB BLD-MCNC: 8.4 G/DL — LOW (ref 11.5–15.5)
MCHC RBC-ENTMCNC: 26.5 PG — LOW (ref 27–34)
MCHC RBC-ENTMCNC: 31.3 G/DL — LOW (ref 32–36)
MCV RBC AUTO: 84.5 FL — SIGNIFICANT CHANGE UP (ref 80–100)
PLATELET # BLD AUTO: 225 K/UL — SIGNIFICANT CHANGE UP (ref 150–400)
POTASSIUM SERPL-MCNC: 3.6 MMOL/L — SIGNIFICANT CHANGE UP (ref 3.5–5.3)
POTASSIUM SERPL-SCNC: 3.6 MMOL/L — SIGNIFICANT CHANGE UP (ref 3.5–5.3)
RBC # BLD: 3.17 M/UL — LOW (ref 3.8–5.2)
RBC # FLD: 15.5 % — SIGNIFICANT CHANGE UP (ref 10.3–16.9)
SODIUM SERPL-SCNC: 141 MMOL/L — SIGNIFICANT CHANGE UP (ref 135–145)
WBC # BLD: 6.8 K/UL — SIGNIFICANT CHANGE UP (ref 3.8–10.5)
WBC # FLD AUTO: 6.8 K/UL — SIGNIFICANT CHANGE UP (ref 3.8–10.5)

## 2017-01-22 RX ADMIN — NEBIVOLOL HYDROCHLORIDE 5 MILLIGRAM(S): 5 TABLET ORAL at 05:45

## 2017-01-22 RX ADMIN — Medication 100 MILLIGRAM(S): at 21:18

## 2017-01-22 RX ADMIN — Medication 100 MILLIGRAM(S): at 05:45

## 2017-01-22 RX ADMIN — Medication 100 MILLIGRAM(S): at 11:28

## 2017-01-22 RX ADMIN — SENNA PLUS 2 TABLET(S): 8.6 TABLET ORAL at 21:19

## 2017-01-22 NOTE — PROGRESS NOTE ADULT - SUBJECTIVE AND OBJECTIVE BOX
S: Patient seen and examined. Doing well this AM. Pain reported but controlled. No acute events overnight. Still feeling sore, but overall improved.    O:   Vital Signs Last 24 Hrs  T(C): 36.7, Max: 36.7 (01-21 @ 09:08)  T(F): 98, Max: 98.1 (01-21 @ 09:08)  HR: 69 (69 - 79)  BP: 158/81 (118/58 - 158/81)  BP(mean): --  RR: 20 (16 - 20)  SpO2: 100% (94% - 100%)    Motor: 5/5 GS/TA/EHL/FHL BL   SILT to patients baseline BL  WWP DP PT BL  Dressing C/D/I

## 2017-01-23 VITALS
SYSTOLIC BLOOD PRESSURE: 108 MMHG | OXYGEN SATURATION: 98 % | RESPIRATION RATE: 18 BRPM | TEMPERATURE: 97 F | DIASTOLIC BLOOD PRESSURE: 53 MMHG | HEART RATE: 83 BPM

## 2017-01-23 LAB
ANION GAP SERPL CALC-SCNC: 9 MMOL/L — SIGNIFICANT CHANGE UP (ref 9–16)
BUN SERPL-MCNC: 11 MG/DL — SIGNIFICANT CHANGE UP (ref 7–23)
CALCIUM SERPL-MCNC: 8.6 MG/DL — SIGNIFICANT CHANGE UP (ref 8.5–10.5)
CHLORIDE SERPL-SCNC: 100 MMOL/L — SIGNIFICANT CHANGE UP (ref 96–108)
CO2 SERPL-SCNC: 30 MMOL/L — SIGNIFICANT CHANGE UP (ref 22–31)
CREAT SERPL-MCNC: 0.37 MG/DL — LOW (ref 0.5–1.3)
GLUCOSE SERPL-MCNC: 115 MG/DL — HIGH (ref 70–99)
HCT VFR BLD CALC: 26.1 % — LOW (ref 34.5–45)
HGB BLD-MCNC: 8.2 G/DL — LOW (ref 11.5–15.5)
MCHC RBC-ENTMCNC: 26.4 PG — LOW (ref 27–34)
MCHC RBC-ENTMCNC: 31.4 G/DL — LOW (ref 32–36)
MCV RBC AUTO: 83.9 FL — SIGNIFICANT CHANGE UP (ref 80–100)
PLATELET # BLD AUTO: 225 K/UL — SIGNIFICANT CHANGE UP (ref 150–400)
POTASSIUM SERPL-MCNC: 3.8 MMOL/L — SIGNIFICANT CHANGE UP (ref 3.5–5.3)
POTASSIUM SERPL-SCNC: 3.8 MMOL/L — SIGNIFICANT CHANGE UP (ref 3.5–5.3)
RBC # BLD: 3.11 M/UL — LOW (ref 3.8–5.2)
RBC # FLD: 15.2 % — SIGNIFICANT CHANGE UP (ref 10.3–16.9)
SODIUM SERPL-SCNC: 139 MMOL/L — SIGNIFICANT CHANGE UP (ref 135–145)
WBC # BLD: 6.3 K/UL — SIGNIFICANT CHANGE UP (ref 3.8–10.5)
WBC # FLD AUTO: 6.3 K/UL — SIGNIFICANT CHANGE UP (ref 3.8–10.5)

## 2017-01-23 RX ORDER — SENNA PLUS 8.6 MG/1
2 TABLET ORAL
Qty: 0 | Refills: 0 | COMMUNITY
Start: 2017-01-23

## 2017-01-23 RX ORDER — OXYCODONE HYDROCHLORIDE 5 MG/1
2 TABLET ORAL
Qty: 56 | Refills: 0 | OUTPATIENT
Start: 2017-01-23 | End: 2017-01-30

## 2017-01-23 RX ORDER — DOCUSATE SODIUM 100 MG
1 CAPSULE ORAL
Qty: 0 | Refills: 0 | DISCHARGE
Start: 2017-01-23

## 2017-01-23 RX ADMIN — Medication 100 MILLIGRAM(S): at 05:24

## 2017-01-23 RX ADMIN — NEBIVOLOL HYDROCHLORIDE 5 MILLIGRAM(S): 5 TABLET ORAL at 05:24

## 2017-01-23 NOTE — PROGRESS NOTE ADULT - SUBJECTIVE AND OBJECTIVE BOX
S: Patient seen and examined. Doing well this AM. Pain reported but controlled. No acute events overnight. Still feeling sore, but overall improved.    O:   Vital Signs Last 24 Hrs  T(C): 36.7, Max: 37.2 (01-22 @ 20:27)  T(F): 98, Max: 99 (01-22 @ 20:27)  HR: 87 (71 - 88)  BP: 142/70 (117/67 - 147/89)  BP(mean): --  RR: 16 (12 - 20)  SpO2: 96% (93% - 98%)      Motor: 5/5 GS/TA/EHL/FHL BL   SILT to patients baseline BL  WWP DP PT BL  Dressing C/D/I

## 2017-01-25 DIAGNOSIS — G47.33 OBSTRUCTIVE SLEEP APNEA (ADULT) (PEDIATRIC): ICD-10-CM

## 2017-01-25 DIAGNOSIS — M48.06 SPINAL STENOSIS, LUMBAR REGION: ICD-10-CM

## 2017-01-25 DIAGNOSIS — D50.0 IRON DEFICIENCY ANEMIA SECONDARY TO BLOOD LOSS (CHRONIC): ICD-10-CM

## 2017-01-25 DIAGNOSIS — Z87.891 PERSONAL HISTORY OF NICOTINE DEPENDENCE: ICD-10-CM

## 2017-01-25 PROCEDURE — C1889: CPT

## 2017-01-25 PROCEDURE — 76000 FLUOROSCOPY <1 HR PHYS/QHP: CPT

## 2017-01-25 PROCEDURE — 86900 BLOOD TYPING SEROLOGIC ABO: CPT

## 2017-01-25 PROCEDURE — 87086 URINE CULTURE/COLONY COUNT: CPT

## 2017-01-25 PROCEDURE — 81001 URINALYSIS AUTO W/SCOPE: CPT

## 2017-01-25 PROCEDURE — 86901 BLOOD TYPING SEROLOGIC RH(D): CPT

## 2017-01-25 PROCEDURE — 81003 URINALYSIS AUTO W/O SCOPE: CPT

## 2017-01-25 PROCEDURE — 86850 RBC ANTIBODY SCREEN: CPT

## 2017-01-25 PROCEDURE — 83735 ASSAY OF MAGNESIUM: CPT

## 2017-01-25 PROCEDURE — 84295 ASSAY OF SERUM SODIUM: CPT

## 2017-01-25 PROCEDURE — 85025 COMPLETE CBC W/AUTO DIFF WBC: CPT

## 2017-01-25 PROCEDURE — 95940 IONM IN OPERATNG ROOM 15 MIN: CPT

## 2017-01-25 PROCEDURE — 84132 ASSAY OF SERUM POTASSIUM: CPT

## 2017-01-25 PROCEDURE — 97530 THERAPEUTIC ACTIVITIES: CPT

## 2017-01-25 PROCEDURE — 85018 HEMOGLOBIN: CPT

## 2017-01-25 PROCEDURE — 36415 COLL VENOUS BLD VENIPUNCTURE: CPT

## 2017-01-25 PROCEDURE — 85027 COMPLETE CBC AUTOMATED: CPT

## 2017-01-25 PROCEDURE — 82330 ASSAY OF CALCIUM: CPT

## 2017-01-25 PROCEDURE — 97001: CPT

## 2017-01-25 PROCEDURE — 80048 BASIC METABOLIC PNL TOTAL CA: CPT

## 2017-01-25 PROCEDURE — C1713: CPT

## 2017-01-25 PROCEDURE — 94660 CPAP INITIATION&MGMT: CPT

## 2017-01-25 PROCEDURE — 71045 X-RAY EXAM CHEST 1 VIEW: CPT

## 2017-01-25 PROCEDURE — 97116 GAIT TRAINING THERAPY: CPT

## 2017-01-27 DIAGNOSIS — M53.2X6 SPINAL INSTABILITIES, LUMBAR REGION: ICD-10-CM

## 2017-01-27 DIAGNOSIS — M81.0 AGE-RELATED OSTEOPOROSIS WITHOUT CURRENT PATHOLOGICAL FRACTURE: ICD-10-CM

## 2017-02-07 PROBLEM — I10 ESSENTIAL (PRIMARY) HYPERTENSION: Chronic | Status: ACTIVE | Noted: 2017-01-06

## 2017-06-12 NOTE — CONSULT NOTE ADULT - LYMPH NODES
CC: Ear cleaning  HPI:Mr. 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  He is here for an ear cleaning procedure.  He also indicates left nasal obstruction symptoms associated with daily headaches which are played him for several weeks now.    PE: Blood pressure 125/82 pulse 70 temperature 97.5  Both ears were examined under the microscope and the micro-procedure room.  A cerumen impaction is extracted from the right ear canal.  The right eardrum is intact and clear as visualized directly.  A cerumen impaction is removed from the for the left ear canal as well.  Left eardrum is intact and clear as visualized.  Nasal exam is performed.  There is evidence for a polypoid appearance of the mucosa of the anterior left middle turbinate.  There is no evidence of purulent discharge in either nasal passage.  Oropharyngeal exam is unremarkable for inflammation infection or ulceration.      DIAGNOSIS:     ICD-10-CM ICD-9-CM    1. Impacted cerumen, bilateral H61.23 380.4      PLAN: Cerumen impactions removed from both eacs  Patient encouraged to use Nasacort nasal steroid spray 1-2 sprays left nostril once a day ×6 weeks  Follow-up re-sinus status and headaches prn  RTC 6 months for ear cleaning      
No lymphadedenopathy

## 2018-10-31 ENCOUNTER — INPATIENT (INPATIENT)
Facility: HOSPITAL | Age: 78
LOS: 6 days | Discharge: NURSING, REHAB OR LONG-TERM CARE | DRG: 54 | End: 2018-11-07
Attending: NEUROLOGICAL SURGERY | Admitting: NEUROLOGICAL SURGERY
Payer: COMMERCIAL

## 2018-10-31 VITALS
HEART RATE: 66 BPM | TEMPERATURE: 101 F | OXYGEN SATURATION: 98 % | SYSTOLIC BLOOD PRESSURE: 144 MMHG | DIASTOLIC BLOOD PRESSURE: 79 MMHG | RESPIRATION RATE: 17 BRPM | WEIGHT: 190.04 LBS

## 2018-10-31 DIAGNOSIS — G93.9 DISORDER OF BRAIN, UNSPECIFIED: ICD-10-CM

## 2018-10-31 DIAGNOSIS — Z98.890 OTHER SPECIFIED POSTPROCEDURAL STATES: Chronic | ICD-10-CM

## 2018-10-31 DIAGNOSIS — Z86.018 PERSONAL HISTORY OF OTHER BENIGN NEOPLASM: Chronic | ICD-10-CM

## 2018-10-31 PROBLEM — G47.30 SLEEP APNEA, UNSPECIFIED: Chronic | Status: ACTIVE | Noted: 2017-01-16

## 2018-10-31 PROBLEM — M48.00 SPINAL STENOSIS, SITE UNSPECIFIED: Chronic | Status: ACTIVE | Noted: 2017-01-16

## 2018-10-31 LAB
ALBUMIN SERPL ELPH-MCNC: 3.5 G/DL — SIGNIFICANT CHANGE UP (ref 3.3–5)
ALP SERPL-CCNC: 77 U/L — SIGNIFICANT CHANGE UP (ref 40–120)
ALT FLD-CCNC: 17 U/L — SIGNIFICANT CHANGE UP (ref 10–45)
ANION GAP SERPL CALC-SCNC: 14 MMOL/L — SIGNIFICANT CHANGE UP (ref 5–17)
APPEARANCE UR: ABNORMAL
APTT BLD: 29.4 SEC — SIGNIFICANT CHANGE UP (ref 27.5–36.3)
AST SERPL-CCNC: 21 U/L — SIGNIFICANT CHANGE UP (ref 10–40)
BACTERIA # UR AUTO: PRESENT /HPF
BASOPHILS NFR BLD AUTO: 0.2 % — SIGNIFICANT CHANGE UP (ref 0–2)
BILIRUB SERPL-MCNC: 0.8 MG/DL — SIGNIFICANT CHANGE UP (ref 0.2–1.2)
BILIRUB UR-MCNC: ABNORMAL
BUN SERPL-MCNC: 12 MG/DL — SIGNIFICANT CHANGE UP (ref 7–23)
CALCIUM SERPL-MCNC: 8.4 MG/DL — SIGNIFICANT CHANGE UP (ref 8.4–10.5)
CHLORIDE SERPL-SCNC: 96 MMOL/L — SIGNIFICANT CHANGE UP (ref 96–108)
CK MB CFR SERPL CALC: 3 NG/ML — SIGNIFICANT CHANGE UP (ref 0–6.7)
CK SERPL-CCNC: 237 U/L — HIGH (ref 25–170)
CO2 SERPL-SCNC: 25 MMOL/L — SIGNIFICANT CHANGE UP (ref 22–31)
COLOR SPEC: YELLOW — SIGNIFICANT CHANGE UP
COMMENT - URINE: SIGNIFICANT CHANGE UP
CREAT SERPL-MCNC: 0.44 MG/DL — LOW (ref 0.5–1.3)
DIFF PNL FLD: ABNORMAL
EOSINOPHIL NFR BLD AUTO: 0.5 % — SIGNIFICANT CHANGE UP (ref 0–6)
EPI CELLS # UR: ABNORMAL /HPF (ref 0–5)
GLUCOSE SERPL-MCNC: 116 MG/DL — HIGH (ref 70–99)
GLUCOSE UR QL: NEGATIVE — SIGNIFICANT CHANGE UP
HCT VFR BLD CALC: 37.9 % — SIGNIFICANT CHANGE UP (ref 34.5–45)
HGB BLD-MCNC: 12.3 G/DL — SIGNIFICANT CHANGE UP (ref 11.5–15.5)
INR BLD: 1.15 — SIGNIFICANT CHANGE UP (ref 0.88–1.16)
KETONES UR-MCNC: >=80 MG/DL
LACTATE SERPL-SCNC: 0.9 MMOL/L — SIGNIFICANT CHANGE UP (ref 0.5–2)
LEUKOCYTE ESTERASE UR-ACNC: NEGATIVE — SIGNIFICANT CHANGE UP
LYMPHOCYTES # BLD AUTO: 7.6 % — LOW (ref 13–44)
MCHC RBC-ENTMCNC: 29.5 PG — SIGNIFICANT CHANGE UP (ref 27–34)
MCHC RBC-ENTMCNC: 32.5 G/DL — SIGNIFICANT CHANGE UP (ref 32–36)
MCV RBC AUTO: 90.9 FL — SIGNIFICANT CHANGE UP (ref 80–100)
MONOCYTES NFR BLD AUTO: 7.1 % — SIGNIFICANT CHANGE UP (ref 2–14)
NEUTROPHILS NFR BLD AUTO: 84.6 % — HIGH (ref 43–77)
NITRITE UR-MCNC: NEGATIVE — SIGNIFICANT CHANGE UP
PH UR: 6 — SIGNIFICANT CHANGE UP (ref 5–8)
PLATELET # BLD AUTO: 191 K/UL — SIGNIFICANT CHANGE UP (ref 150–400)
POTASSIUM SERPL-MCNC: 3.3 MMOL/L — LOW (ref 3.5–5.3)
POTASSIUM SERPL-SCNC: 3.3 MMOL/L — LOW (ref 3.5–5.3)
PROT SERPL-MCNC: 6.2 G/DL — SIGNIFICANT CHANGE UP (ref 6–8.3)
PROT UR-MCNC: NEGATIVE MG/DL — SIGNIFICANT CHANGE UP
PROTHROM AB SERPL-ACNC: 13 SEC — HIGH (ref 10–12.9)
RBC # BLD: 4.17 M/UL — SIGNIFICANT CHANGE UP (ref 3.8–5.2)
RBC # FLD: 13 % — SIGNIFICANT CHANGE UP (ref 10.3–16.9)
RBC CASTS # UR COMP ASSIST: ABNORMAL /HPF
SODIUM SERPL-SCNC: 135 MMOL/L — SIGNIFICANT CHANGE UP (ref 135–145)
SP GR SPEC: 1.01 — SIGNIFICANT CHANGE UP (ref 1–1.03)
TROPONIN T SERPL-MCNC: <0.01 NG/ML — SIGNIFICANT CHANGE UP (ref 0–0.01)
UROBILINOGEN FLD QL: 0.2 E.U./DL — SIGNIFICANT CHANGE UP
WBC # BLD: 11.4 K/UL — HIGH (ref 3.8–10.5)
WBC # FLD AUTO: 11.4 K/UL — HIGH (ref 3.8–10.5)
WBC UR QL: ABNORMAL /HPF

## 2018-10-31 PROCEDURE — 73030 X-RAY EXAM OF SHOULDER: CPT | Mod: 26,LT

## 2018-10-31 PROCEDURE — 99285 EMERGENCY DEPT VISIT HI MDM: CPT | Mod: 25

## 2018-10-31 PROCEDURE — 73060 X-RAY EXAM OF HUMERUS: CPT | Mod: 26,LT

## 2018-10-31 PROCEDURE — 93970 EXTREMITY STUDY: CPT | Mod: 26

## 2018-10-31 PROCEDURE — 73030 X-RAY EXAM OF SHOULDER: CPT | Mod: 26

## 2018-10-31 PROCEDURE — 71101 X-RAY EXAM UNILAT RIBS/CHEST: CPT | Mod: 26,RT

## 2018-10-31 PROCEDURE — 70450 CT HEAD/BRAIN W/O DYE: CPT | Mod: 26

## 2018-10-31 PROCEDURE — 93010 ELECTROCARDIOGRAM REPORT: CPT

## 2018-10-31 PROCEDURE — 93306 TTE W/DOPPLER COMPLETE: CPT | Mod: 26

## 2018-10-31 RX ORDER — SENNA PLUS 8.6 MG/1
2 TABLET ORAL AT BEDTIME
Qty: 0 | Refills: 0 | Status: DISCONTINUED | OUTPATIENT
Start: 2018-10-31 | End: 2018-11-07

## 2018-10-31 RX ORDER — DOCUSATE SODIUM 100 MG
100 CAPSULE ORAL THREE TIMES A DAY
Qty: 0 | Refills: 0 | Status: DISCONTINUED | OUTPATIENT
Start: 2018-10-31 | End: 2018-10-31

## 2018-10-31 RX ORDER — SODIUM CHLORIDE 9 MG/ML
1000 INJECTION INTRAMUSCULAR; INTRAVENOUS; SUBCUTANEOUS
Qty: 0 | Refills: 0 | Status: DISCONTINUED | OUTPATIENT
Start: 2018-10-31 | End: 2018-11-02

## 2018-10-31 RX ORDER — LEVETIRACETAM 250 MG/1
500 TABLET, FILM COATED ORAL
Qty: 0 | Refills: 0 | Status: DISCONTINUED | OUTPATIENT
Start: 2018-11-01 | End: 2018-11-07

## 2018-10-31 RX ORDER — BUPROPION HYDROCHLORIDE 150 MG/1
300 TABLET, EXTENDED RELEASE ORAL DAILY
Qty: 0 | Refills: 0 | Status: DISCONTINUED | OUTPATIENT
Start: 2018-10-31 | End: 2018-11-07

## 2018-10-31 RX ORDER — DOCUSATE SODIUM 100 MG
100 CAPSULE ORAL THREE TIMES A DAY
Qty: 0 | Refills: 0 | Status: DISCONTINUED | OUTPATIENT
Start: 2018-10-31 | End: 2018-11-07

## 2018-10-31 RX ORDER — PANTOPRAZOLE SODIUM 20 MG/1
40 TABLET, DELAYED RELEASE ORAL
Qty: 0 | Refills: 0 | Status: DISCONTINUED | OUTPATIENT
Start: 2018-10-31 | End: 2018-11-03

## 2018-10-31 RX ORDER — POTASSIUM CHLORIDE 20 MEQ
40 PACKET (EA) ORAL ONCE
Qty: 0 | Refills: 0 | Status: COMPLETED | OUTPATIENT
Start: 2018-10-31 | End: 2018-10-31

## 2018-10-31 RX ORDER — LEVETIRACETAM 250 MG/1
500 TABLET, FILM COATED ORAL ONCE
Qty: 0 | Refills: 0 | Status: DISCONTINUED | OUTPATIENT
Start: 2018-10-31 | End: 2018-10-31

## 2018-10-31 RX ORDER — NEBIVOLOL HYDROCHLORIDE 5 MG/1
5 TABLET ORAL ONCE
Qty: 0 | Refills: 0 | Status: COMPLETED | OUTPATIENT
Start: 2018-10-31 | End: 2018-10-31

## 2018-10-31 RX ORDER — ACETAMINOPHEN 500 MG
650 TABLET ORAL ONCE
Qty: 0 | Refills: 0 | Status: COMPLETED | OUTPATIENT
Start: 2018-10-31 | End: 2018-10-31

## 2018-10-31 RX ORDER — SENNA PLUS 8.6 MG/1
2 TABLET ORAL AT BEDTIME
Qty: 0 | Refills: 0 | Status: DISCONTINUED | OUTPATIENT
Start: 2018-10-31 | End: 2018-10-31

## 2018-10-31 RX ORDER — METOPROLOL TARTRATE 50 MG
12.5 TABLET ORAL
Qty: 0 | Refills: 0 | Status: DISCONTINUED | OUTPATIENT
Start: 2018-10-31 | End: 2018-11-07

## 2018-10-31 RX ORDER — ONDANSETRON 8 MG/1
4 TABLET, FILM COATED ORAL EVERY 6 HOURS
Qty: 0 | Refills: 0 | Status: DISCONTINUED | OUTPATIENT
Start: 2018-10-31 | End: 2018-11-07

## 2018-10-31 RX ORDER — SODIUM CHLORIDE 9 MG/ML
1000 INJECTION INTRAMUSCULAR; INTRAVENOUS; SUBCUTANEOUS
Qty: 0 | Refills: 0 | Status: DISCONTINUED | OUTPATIENT
Start: 2018-10-31 | End: 2018-10-31

## 2018-10-31 RX ORDER — SODIUM CHLORIDE 9 MG/ML
2700 INJECTION INTRAMUSCULAR; INTRAVENOUS; SUBCUTANEOUS ONCE
Qty: 0 | Refills: 0 | Status: COMPLETED | OUTPATIENT
Start: 2018-10-31 | End: 2018-10-31

## 2018-10-31 RX ADMIN — SODIUM CHLORIDE 50 MILLILITER(S): 9 INJECTION INTRAMUSCULAR; INTRAVENOUS; SUBCUTANEOUS at 15:59

## 2018-10-31 RX ADMIN — LEVETIRACETAM 500 MILLIGRAM(S): 250 TABLET, FILM COATED ORAL at 14:08

## 2018-10-31 RX ADMIN — SODIUM CHLORIDE 2700 MILLILITER(S): 9 INJECTION INTRAMUSCULAR; INTRAVENOUS; SUBCUTANEOUS at 11:11

## 2018-10-31 RX ADMIN — Medication 650 MILLIGRAM(S): at 12:18

## 2018-10-31 RX ADMIN — SODIUM CHLORIDE 125 MILLILITER(S): 9 INJECTION INTRAMUSCULAR; INTRAVENOUS; SUBCUTANEOUS at 13:40

## 2018-10-31 RX ADMIN — Medication 12.5 MILLIGRAM(S): at 22:54

## 2018-10-31 RX ADMIN — Medication 650 MILLIGRAM(S): at 11:14

## 2018-10-31 RX ADMIN — Medication 100 MILLIGRAM(S): at 22:55

## 2018-10-31 RX ADMIN — SENNA PLUS 2 TABLET(S): 8.6 TABLET ORAL at 22:54

## 2018-10-31 RX ADMIN — SODIUM CHLORIDE 2700 MILLILITER(S): 9 INJECTION INTRAMUSCULAR; INTRAVENOUS; SUBCUTANEOUS at 12:11

## 2018-10-31 RX ADMIN — NEBIVOLOL HYDROCHLORIDE 5 MILLIGRAM(S): 5 TABLET ORAL at 13:07

## 2018-10-31 RX ADMIN — Medication 40 MILLIEQUIVALENT(S): at 14:08

## 2018-10-31 NOTE — H&P ADULT - NSHPREVIEWOFSYSTEMS_GEN_ALL_CORE
CV: RRR No JVD  PULM: Lungs CTAB  Respirations regular and unlabored  PV: +peripheal pulses no edema  GI: Abdomen round sodt +BS non tender  : Voiding without difficulty  Skin warm and dry  Neuro: A&OX3 Cranial nerves intacrt  RUE BLE 5/5  LUE limited ROM secondary to L humerus fracture

## 2018-10-31 NOTE — H&P ADULT - NSHPPHYSICALEXAM_GEN_ALL_CORE
A&OX3 Cranial nerves intact  RUE BLE 5/5  LUE limited ROM d/t Humerus fractureCV: RRR No JVD  PULM: Lungs CTAB  Respirations regular and unlabored  PV: +peripheal pulses no edema  GI: Abdomen round sodt +BS non tender  : Voiding without difficulty  Skin warm and dry

## 2018-10-31 NOTE — ED PROVIDER NOTE - DIAGNOSTIC INTERPRETATION
ER Physician:  Laura Director  CHEST XRAY INTERPRETATION: lungs clear, heart shadow normal, bony structures intact   ER Physician: Laura Director  INTERPRETATION: rib - no acute fracture; no soft tissue swelling noted; normal bony alignment. s/p orif neck  ER Physician: Laura Director  INTERPRETATION:  shoulder/humerus - prox humerus fx, comminuted; + soft tissue swelling noted; normal bony alignment. + orif l spine ER Physician:  Laura Director  CHEST XRAY INTERPRETATION: lungs clear, heart shadow normal, L prox humerus fx  ER Physician: Laura Director  INTERPRETATION: rib - no acute fracture; no soft tissue swelling noted; normal bony alignment. s/p orif neck  ER Physician: Laura Director  INTERPRETATION:  shoulder/humerus - prox humerus fx, comminuted; + soft tissue swelling noted; normal bony alignment. + orif l spine

## 2018-10-31 NOTE — ED PROVIDER NOTE - ENMT, MLM
Airway patent, Nasal mucosa clear. Mouth with normal mucosa. Throat has no vesicles, no oropharyngeal exudates and uvula is midline. no facial bone ttp, old abrasion L periorbital and upper lip

## 2018-10-31 NOTE — ED PROVIDER NOTE - CONSTITUTIONAL, MLM
normal... ill appearing, obese, awake, alert, oriented to person, place, time/situation and in no apparent distress.

## 2018-10-31 NOTE — H&P ADULT - ATTENDING COMMENTS
Patient presents after a mechanical fall. She was found to have a 4.5 x 3 cm dural-based mass arising from the frontal falx creating mass effect on both hemispheres. Patient is neurologically non-focal at this point however she feels unsteady on her feet. Given the degree of compression present, I have offered surgical resection. I suspect that this tumor is a slow growing meningioma and I have also discussed the possibility of conservative followup imaging in approximately 3 months. She understands that we in fact do not know the true growth rate or pathology of this mass and that there is some risk to a conservative approach. She was also made aware of the surgical risks. She would like to hold off on surgery for now. She has not been clear by PT to go home and will need to go to a subacute facility.    Herberth Carrera M.D.  Neurosurgery

## 2018-10-31 NOTE — CONSULT NOTE ADULT - SUBJECTIVE AND OBJECTIVE BOX
Orthopaedic Consult Note    HPI  77y RHD Female  c/o left shoulder pain s/p mechanical fall on Monday 10/29. Pt endorses bilateral knee osteoarthritis/states she has had several episodes of falls due to bilateral knee "buckling". On Monday she endorses one of these episodes which caused her to land onto her left shoulder. She denies head trauma/LOC before or after time of injury. Pt states she waited to come to the hospital as the pain was tolerable until today. She states at rest her shoulder pain is minimal however endorses significant pain with left upper extremity gross movements or any attempt to reach overhead. She denies numbness/tingling/weakness throughout her left upper extremity/left hand. Pt denies being on any blood thinners and denies DVT/PE hx. Pt is known to Dr. Mayo (hx of ACDF and lumbar spine surgery).     PAST MEDICAL & SURGICAL HISTORY:  Depression  Brain mass  Sleep apnea: uses CPAP  Spinal stenosis  HTN (hypertension)  History of benign breast tumor  History of cervical discectomy: 2012  History of cosmetic surgery  H/O gastric bypass: 2002    Allergies    No Known Allergies    Intolerances      MEDICATIONS  (STANDING):  docusate sodium 100 milliGRAM(s) Oral three times a day  levETIRAcetam 500 milliGRAM(s) Oral two times a day  levETIRAcetam 500 milliGRAM(s) Oral once  metoprolol tartrate 12.5 milliGRAM(s) Oral two times a day  pantoprazole    Tablet 40 milliGRAM(s) Oral before breakfast  senna 2 Tablet(s) Oral at bedtime  sodium chloride 0.9%. 1000 milliLiter(s) (125 mL/Hr) IV Continuous <Continuous>  sodium chloride 0.9%. 1000 milliLiter(s) (50 mL/Hr) IV Continuous <Continuous>      Vital Signs Last 24 Hrs  T(C): 36.7 (31 Oct 2018 13:09), Max: 38.2 (31 Oct 2018 10:12)  T(F): 98 (31 Oct 2018 13:09), Max: 100.8 (31 Oct 2018 10:12)  HR: 73 (31 Oct 2018 14:19) (62 - 73)  BP: 160/69 (31 Oct 2018 14:19) (144/79 - 181/84)  BP(mean): --  RR: 18 (31 Oct 2018 14:19) (17 - 20)  SpO2: 97% (31 Oct 2018 14:19) (96% - 98%)    Physical Exam: Pt laying comfortably in stretcher, NAD  Left shoulder swelling and ecchymoses; minimal tenderness to palpation of left shoulder; left elbow and left upper extremity nontender to palpation; pt is able to actively range left elbow and wrist. WF/WE 5/5 motor strength; Sensation intact to bilateral UE distally. Motor Strength 5/5 to /interossei bilaterally. (triceps/biceps/deltoid unable to assess 2/2 pain) AIN/PIN intact. Radial median ulnar nerves in tact.                             12.3   11.4  )-----------( 191      ( 31 Oct 2018 11:14 )             37.9     10-31    135  |  96  |  12  ----------------------------<  116<H>  3.3<L>   |  25  |  0.44<L>    Ca    8.4      31 Oct 2018 12:06    TPro  6.2  /  Alb  3.5  /  TBili  0.8  /  DBili  x   /  AST  21  /  ALT  17  /  AlkPhos  77  10-31    Imaging: XRAY Left humerus/Left shoulder: left proximal humerus fracture     A/P: 77yFemale w/ left proximal humerus fracture   No acute orthopaedic intervention at this time ; patient is neuromotor in tact   NWB LUE; sling placed by ortho team   pain control as needed   Dispo to be confirmed with Dr. Barr

## 2018-10-31 NOTE — ED ADULT TRIAGE NOTE - CHIEF COMPLAINT QUOTE
c/o left shoulder pain and bruising.  Patient verbalized "my knees gave out on me on Monday night and I fell and was on the floor since Monday."  Patient's superintendent called ems.  Hx arthritis, depression. Denies loc, neck/back pain

## 2018-10-31 NOTE — ED PROVIDER NOTE - MUSCULOSKELETAL, MLM
Spine appears normal, neck/back nontender, decreased rom, ttp and old ecchymosis L shoulder, no clavicle or other lue ttp, radial 1+, bilat le w/o sig ttp, small old ecchysmosis L medial knee w/o bony ttp, dp 1+ bilat

## 2018-10-31 NOTE — ED PROVIDER NOTE - NEUROLOGICAL, MLM
awake, alert, oriented x 3, CN II-XII grossly intact, motor 5/5 ue but limited lue 2/2 pain, 4/5 bilat le, no gross sens deficits, speech clear.

## 2018-10-31 NOTE — H&P ADULT - PROBLEM SELECTOR PLAN 1
Admit to Tele Dr RAMON Carrera  Monitoer neuro status  Start Kppra 500mg q12h  MRI [facility] protocol Brain  Dopplers BLE routine  MRSA nares  Monitor BP  Continue home meds  Medical Consult  EKG  Echo  EKG  Resume all home medications  SCD BLE    Dispo: Will discuss with th attending

## 2018-10-31 NOTE — ED PROVIDER NOTE - PROGRESS NOTE DETAILS
Pt w abnl mass on ct head - nsg paged to discuss.  Pt w prox humerus fx - ortho paged to discuss. Ortho will eval. Ortho will eval.  NSG requests mri w and w/o contrast. WBC, lactate nl.  Card enzymes, ekg wnl, CK wnl.  K slightly low.  CXR w/o acute process.  UA pending pt providing sample.  Pt w abnl mass on ct head - nsg paged to discuss.  Pt w prox humerus fx - ortho paged to discuss. NSG eval pt and want to admit, keppra.  Pt pending ua, cx's - nsg to fu on pending results. NSG eval pt and want to admit, keppra.  Pt pending ua, cx's - nsg to fu on pending results.  Eval by ortho - sling placed and they will follow.

## 2018-10-31 NOTE — ED PROVIDER NOTE - MEDICAL DECISION MAKING DETAILS
Pt w fall 2/2 knees buckling w chi and minor facial trauma w/o concern for facial fx and unable to get up from floor.  Pt w shoulder injury ? fx shoulder vs prox humerus vs contusion.  Poss rhabdo 2/2 being on floor x 2 d.  Pt also w low grade fever w/o uri sx, abd pain, localizing sx other than shoulder pain -doubt 2/2 fx, ? uti.  Pt dehydrated on exam - ivf ordered.  Plan labs, cx's, ivf, ct head, xrays, pt declined pain meds; reassess.  Likely admit for safety even if eval neg.

## 2018-10-31 NOTE — ED PROVIDER NOTE - OBJECTIVE STATEMENT
76 yo female h/o vance, spinal stenosis s/p neck and lumbar surgery, htn (no meds since fall) c/o her knees buckling and hitting her head/face on Sun w return to her nl activity.  Pt felt knees buckle again on Mon and hit L shoulder w pain, denies other injury but could not get up.  Pt assisted into a chair by her doorman but felt she was not firmly enough on the chair so lowered herself to the floor and then unable to get up or get to her phone to call for help.  Pt found by her doorman this am after her cleaning lady arrived and could not reach her; he found her on the floor and called 911.  Pt unaware she had fever.  No ha, uri sx, cough, sob, cp, abd pain, n/v/d, dysuria.  Pt denies neck/back pain, numbness or weakness in ext other than her knees buckling.  Pt c/o thirst and fatigue.

## 2018-10-31 NOTE — H&P ADULT - HISTORY OF PRESENT ILLNESS
76yo Right handed female s/p fall X2 mechanical on 1028  Denies lose of consciousness, headaches, nausea,vomiting incontinent of stool or bladder    C/O LUE pain Xray revealed humerus fracture  Head CT revealed < from: CT Head No Cont (10.31.18 @ 12:05) >  Impression: No acute intracranial injury. 4.6 cm mildly hyperdense   bilateral parafalcine extra-axial mass along the anterior   interhemispheric fissure.     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

## 2018-10-31 NOTE — ED ADULT NURSE NOTE - OBJECTIVE STATEMENT
Pt presents after multiple falls. Pt states she fell on sunday and then again on Monday. States her "knees gave out". Pt was able to call doorman on Monday and was moved to chair but refused to come to hospital. Then on Monday night pt lowered herself to the floor to try to reach her phone and was stuck there. Today her superintendent opened the door and found her on the floor and called EMS. Pt states she has L shoulder pain, bruising noted. Bruising also noted under L eye and nose.

## 2018-10-31 NOTE — H&P ADULT - PSH
H/O gastric bypass  2002  History of benign breast tumor    History of cervical discectomy  2012  History of cosmetic surgery

## 2018-11-01 DIAGNOSIS — G47.30 SLEEP APNEA, UNSPECIFIED: ICD-10-CM

## 2018-11-01 DIAGNOSIS — S42.209A UNSPECIFIED FRACTURE OF UPPER END OF UNSPECIFIED HUMERUS, INITIAL ENCOUNTER FOR CLOSED FRACTURE: ICD-10-CM

## 2018-11-01 DIAGNOSIS — Z01.818 ENCOUNTER FOR OTHER PREPROCEDURAL EXAMINATION: ICD-10-CM

## 2018-11-01 DIAGNOSIS — I10 ESSENTIAL (PRIMARY) HYPERTENSION: ICD-10-CM

## 2018-11-01 DIAGNOSIS — I51.7 CARDIOMEGALY: ICD-10-CM

## 2018-11-01 LAB
ANION GAP SERPL CALC-SCNC: 11 MMOL/L — SIGNIFICANT CHANGE UP (ref 5–17)
BUN SERPL-MCNC: 13 MG/DL — SIGNIFICANT CHANGE UP (ref 7–23)
CALCIUM SERPL-MCNC: 8.7 MG/DL — SIGNIFICANT CHANGE UP (ref 8.4–10.5)
CHLORIDE SERPL-SCNC: 100 MMOL/L — SIGNIFICANT CHANGE UP (ref 96–108)
CO2 SERPL-SCNC: 26 MMOL/L — SIGNIFICANT CHANGE UP (ref 22–31)
CREAT SERPL-MCNC: 0.49 MG/DL — LOW (ref 0.5–1.3)
GLUCOSE SERPL-MCNC: 124 MG/DL — HIGH (ref 70–99)
HBA1C BLD-MCNC: 5.4 % — SIGNIFICANT CHANGE UP (ref 4–5.6)
HCT VFR BLD CALC: 35.8 % — SIGNIFICANT CHANGE UP (ref 34.5–45)
HGB BLD-MCNC: 11.6 G/DL — SIGNIFICANT CHANGE UP (ref 11.5–15.5)
MAGNESIUM SERPL-MCNC: 2 MG/DL — SIGNIFICANT CHANGE UP (ref 1.6–2.6)
MCHC RBC-ENTMCNC: 29.9 PG — SIGNIFICANT CHANGE UP (ref 27–34)
MCHC RBC-ENTMCNC: 32.4 G/DL — SIGNIFICANT CHANGE UP (ref 32–36)
MCV RBC AUTO: 92.3 FL — SIGNIFICANT CHANGE UP (ref 80–100)
PHOSPHATE SERPL-MCNC: 1.7 MG/DL — LOW (ref 2.5–4.5)
PLATELET # BLD AUTO: 191 K/UL — SIGNIFICANT CHANGE UP (ref 150–400)
POTASSIUM SERPL-MCNC: 4.1 MMOL/L — SIGNIFICANT CHANGE UP (ref 3.5–5.3)
POTASSIUM SERPL-SCNC: 4.1 MMOL/L — SIGNIFICANT CHANGE UP (ref 3.5–5.3)
RBC # BLD: 3.88 M/UL — SIGNIFICANT CHANGE UP (ref 3.8–5.2)
RBC # FLD: 13.2 % — SIGNIFICANT CHANGE UP (ref 10.3–16.9)
SODIUM SERPL-SCNC: 137 MMOL/L — SIGNIFICANT CHANGE UP (ref 135–145)
WBC # BLD: 6.8 K/UL — SIGNIFICANT CHANGE UP (ref 3.8–10.5)
WBC # FLD AUTO: 6.8 K/UL — SIGNIFICANT CHANGE UP (ref 3.8–10.5)

## 2018-11-01 PROCEDURE — 99223 1ST HOSP IP/OBS HIGH 75: CPT | Mod: GC

## 2018-11-01 PROCEDURE — 99223 1ST HOSP IP/OBS HIGH 75: CPT

## 2018-11-01 PROCEDURE — 70496 CT ANGIOGRAPHY HEAD: CPT | Mod: 26

## 2018-11-01 PROCEDURE — 70553 MRI BRAIN STEM W/O & W/DYE: CPT | Mod: 26

## 2018-11-01 PROCEDURE — 99233 SBSQ HOSP IP/OBS HIGH 50: CPT

## 2018-11-01 RX ORDER — SODIUM,POTASSIUM PHOSPHATES 278-250MG
2 POWDER IN PACKET (EA) ORAL
Qty: 0 | Refills: 0 | Status: COMPLETED | OUTPATIENT
Start: 2018-11-01 | End: 2018-11-01

## 2018-11-01 RX ORDER — POTASSIUM CHLORIDE 20 MEQ
40 PACKET (EA) ORAL ONCE
Qty: 0 | Refills: 0 | Status: COMPLETED | OUTPATIENT
Start: 2018-11-01 | End: 2018-11-01

## 2018-11-01 RX ORDER — INFLUENZA VIRUS VACCINE 15; 15; 15; 15 UG/.5ML; UG/.5ML; UG/.5ML; UG/.5ML
0.5 SUSPENSION INTRAMUSCULAR ONCE
Qty: 0 | Refills: 0 | Status: COMPLETED | OUTPATIENT
Start: 2018-11-01 | End: 2018-11-07

## 2018-11-01 RX ORDER — ACETAMINOPHEN 500 MG
650 TABLET ORAL EVERY 6 HOURS
Qty: 0 | Refills: 0 | Status: DISCONTINUED | OUTPATIENT
Start: 2018-11-01 | End: 2018-11-07

## 2018-11-01 RX ADMIN — Medication 100 MILLIGRAM(S): at 07:26

## 2018-11-01 RX ADMIN — Medication 100 MILLIGRAM(S): at 22:17

## 2018-11-01 RX ADMIN — PANTOPRAZOLE SODIUM 40 MILLIGRAM(S): 20 TABLET, DELAYED RELEASE ORAL at 07:25

## 2018-11-01 RX ADMIN — Medication 12.5 MILLIGRAM(S): at 18:05

## 2018-11-01 RX ADMIN — BUPROPION HYDROCHLORIDE 300 MILLIGRAM(S): 150 TABLET, EXTENDED RELEASE ORAL at 10:54

## 2018-11-01 RX ADMIN — SENNA PLUS 2 TABLET(S): 8.6 TABLET ORAL at 22:17

## 2018-11-01 RX ADMIN — LEVETIRACETAM 500 MILLIGRAM(S): 250 TABLET, FILM COATED ORAL at 03:56

## 2018-11-01 RX ADMIN — Medication 650 MILLIGRAM(S): at 13:38

## 2018-11-01 RX ADMIN — Medication 100 MILLIGRAM(S): at 13:38

## 2018-11-01 RX ADMIN — LEVETIRACETAM 500 MILLIGRAM(S): 250 TABLET, FILM COATED ORAL at 13:38

## 2018-11-01 RX ADMIN — Medication 2 PACKET(S): at 22:17

## 2018-11-01 RX ADMIN — Medication 40 MILLIEQUIVALENT(S): at 10:53

## 2018-11-01 RX ADMIN — Medication 12.5 MILLIGRAM(S): at 07:25

## 2018-11-01 RX ADMIN — Medication 2 PACKET(S): at 19:41

## 2018-11-01 NOTE — CONSULT NOTE ADULT - ATTENDING COMMENTS
Patient seen and examined with house-staff during bedside rounds.  Resident note read, including vitals, physical findings, laboratory data, and radiological reports.   Revisions included below.  Direct personal management at bed side and extensive interpretation of the data.  Plan was outlined and discussed in details with the housestaff.  Decision making of high complexity  Action taken for acute disease activity to reflect the level of care provided:  - medication reconciliation  - review laboratory data  I reviewed the CT scan of the head. I reviewed the chest x-ray. The official report stated that chest x-ray was unremarkable. My concern is the fondness in the right hilar area. Recommend CT scan of the chest

## 2018-11-01 NOTE — PROGRESS NOTE ADULT - SUBJECTIVE AND OBJECTIVE BOX
HPI:  76yo Right handed female s/p fall X2 mechanical on 1028  Denies lose of consciousness, headaches, nausea,vomiting incontinent of stool or bladder    C/O LUE pain Xray revealed humerus fracture  Head CT revealed < from: CT Head No Cont (10.31.18 @ 12:05) >  Impression: No acute intracranial injury. 4.6 cm mildly hyperdense   bilateral parafalcine extra-axial mass along the anterior   interhemispheric fissure.     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)    OVERNIGHT EVENTS:  Vital Signs Last 24 Hrs  T(C): 37.8 (2018 05:27), Max: 38.2 (31 Oct 2018 10:12)  T(F): 100.1 (2018 05:27), Max: 100.8 (31 Oct 2018 10:12)  HR: 74 (2018 01:21) (62 - 74)  BP: 158/69 (2018 01:21) (111/64 - 181/84)  BP(mean): 99 (2018 01:21) (99 - 110)  RR: 18 (2018 01:21) (17 - 20)  SpO2: 95% (2018 01:21) (93% - 98%)    I&O's Summary    31 Oct 2018 07:01  -  2018 07:00  --------------------------------------------------------  IN: 0 mL / OUT: 700 mL / NET: -700 mL        PHYSICAL EXAM:  Neurological:    A&OX3 Cranial nerves intact  SHARMA      Cardiovascular: RRR  Respiratory: Lungs CTAB  Gastrointestinal: +BS  Genitourinary: Voiding without difficulty  Extremities: warm and dry    DIET: Regular    LABS:                        12.3   11.4  )-----------( 191      ( 31 Oct 2018 11:14 )             37.9     10-    135  |  96  |  12  ----------------------------<  116<H>  3.3<L>   |  25  |  0.44<L>    Ca    8.4      31 Oct 2018 12:06  Phos  1.7     11  Mg     2.0         TPro  6.2  /  Alb  3.5  /  TBili  0.8  /  DBili  x   /  AST  21  /  ALT  17  /  AlkPhos  77  10-31    PT/INR - ( 31 Oct 2018 12:06 )   PT: 13.0 sec;   INR: 1.15          PTT - ( 31 Oct 2018 12:06 )  PTT:29.4 sec  Urinalysis Basic - ( 31 Oct 2018 14:46 )    Color: Yellow / Appearance: SL Cloudy / S.015 / pH: x  Gluc: x / Ketone: >=80 mg/dL  / Bili: Small / Urobili: 0.2 E.U./dL   Blood: x / Protein: NEGATIVE mg/dL / Nitrite: NEGATIVE   Leuk Esterase: NEGATIVE / RBC: 5-10 /HPF / WBC 5-10 /HPF   Sq Epi: x / Non Sq Epi: 5-10 /HPF / Bacteria: Present /HPF      CARDIAC MARKERS ( 31 Oct 2018 12:06 )  x     / <0.01 ng/mL / 237 U/L / x     / 3.0 ng/mL      CAPILLARY BLOOD GLUCOSE      Allergies    No Known Allergies    Intolerances      MEDICATIONS:  Antibiotics:    Neuro:  buPROPion XL . 300 milliGRAM(s) Oral daily  levETIRAcetam 500 milliGRAM(s) Oral two times a day  ondansetron Injectable 4 milliGRAM(s) IV Push every 6 hours PRN    Anticoagulation:    OTHER:  docusate sodium 100 milliGRAM(s) Oral three times a day  influenza   Vaccine 0.5 milliLiter(s) IntraMuscular once  metoprolol tartrate 12.5 milliGRAM(s) Oral two times a day  pantoprazole    Tablet 40 milliGRAM(s) Oral before breakfast  senna 2 Tablet(s) Oral at bedtime    IVF:  sodium chloride 0.9%. 1000 milliLiter(s) IV Continuous <Continuous>    CULTURES:  Culture Results:   No growth at 12 hours (10-31 @ 18:08)  Culture Results:   No growth at 12 hours (10-31 @ 18:08)    RADIOLOGY & ADDITIONAL TESTS:      ASSESSMENT:  77y Female s/p mechanical fall Right humerus fx  CT revealed brain mass    PLAN:    NEURO:    Monitor neuro status  AED  MRI [facility] protocol brain  BP monitor  Medical consult      Dispo: Discussed with attending

## 2018-11-01 NOTE — CONSULT NOTE ADULT - PROBLEM SELECTOR RECOMMENDATION 6
The patient's medical condition is optimized for surgery.  There is no contraindication for surgery.  There is no clinical evidence neither of angina, decompensated CHF, arrhthymias, nor valvular disease.   There is no limitation of exercise capacity.  MET is 5 .  ASA class is 2.  Carter cardiac risk factor is low  .  DVT prophylaxis is indicated.  Pain control.  Early mobilization.  Avoid fluid overload.

## 2018-11-01 NOTE — CONSULT NOTE ADULT - SUBJECTIVE AND OBJECTIVE BOX
Patient is a 77y old  Female who presents with a chief complaint of s/p mechanical fall (01 Nov 2018 08:07)     Lesion in the brain   R/O meningioma   No dizziness , numbness or tingling        HPI:  78yo Right handed female s/p fall X2 mechanical on 1028  Denies lose of consciousness, headaches, nausea,vomiting incontinent of stool or bladder    C/O LUE pain Xray revealed humerus fracture  Head CT revealed < from: CT Head No Cont (10.31.18 @ 12:05) >  Impression: No acute intracranial injury. 4.6 cm mildly hyperdense   bilateral parafalcine extra-axial mass along the anterior   interhemispheric fissure.     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 300 milliGRAM(s) Oral daily  docusate sodium 100 milliGRAM(s) Oral three times a day  influenza   Vaccine 0.5 milliLiter(s) IntraMuscular once  levETIRAcetam 500 milliGRAM(s) Oral two times a day  metoprolol tartrate 12.5 milliGRAM(s) Oral two times a day  pantoprazole    Tablet 40 milliGRAM(s) Oral before breakfast  potassium chloride    Tablet ER 40 milliEquivalent(s) Oral once  senna 2 Tablet(s) Oral at bedtime  sodium chloride 0.9%. 1000 milliLiter(s) (50 mL/Hr) IV Continuous <Continuous>    MEDICATIONS  (PRN):  ondansetron Injectable 4 milliGRAM(s) IV Push every 6 hours PRN Nausea and/or Vomiting      PAST MEDICAL & SURGICAL HISTORY:  Depression  Brain mass  Sleep apnea: uses CPAP  Spinal stenosis  HTN (hypertension)  History of benign breast tumor  History of cervical discectomy: 2012  History of cosmetic surgery  H/O gastric bypass: 2002      Labs                          12.3   11.4  )-----------( 191      ( 31 Oct 2018 11:14 )             37.9     10-31    135  |  96  |  12  ----------------------------<  116<H>  3.3<L>   |  25  |  0.44<L>    Ca    8.4      31 Oct 2018 12:06  Phos  1.7     11-01  Mg     2.0     11-01    TPro  6.2  /  Alb  3.5  /  TBili  0.8  /  DBili  x   /  AST  21  /  ALT  17  /  AlkPhos  77  10-31      Radiology:    Physical Exam    MENTAL STATUS  -Level of Consciousness- awake    Orientation- person, place time  Language- aphasia/ dysarthria nl  Memory- recent and remote- nl      Cranial Nerve 1- 12  Pupils- equal and reactive  Eye movements- full  Facial - no asymmetry   Lower CN-nl    Gait and Station- n/a    MOTOR  Upper- no drift  Lower- no foot drop    Reflexes- intact    Sensation- no sensory level    Cerebellar- no tremors    vascular - no bruits    Assessment- head trauma    MRI head to r/o meningioma     Plan

## 2018-11-01 NOTE — PROGRESS NOTE ADULT - ASSESSMENT
77y/F with  1.  bilateral parafalcine mass, brain compression  2.  Fracture, L proximal humerus   3.  depression  4.  h/o sleep apnea, spinal stenosis  5.  HTN    PLAN:   NEURO: neurochecks q4h, PRN pain meds with Tylenol  meningioma:  MRI kalyan  seizure prophylaxis: levetiracetam 500mg PO BID per neurosurgery  REHAB:  physical therapy evaluation and management    EARLY MOB:  ambulate as tolerated    PULM:  Room air, incentive spirometry  CARDIO:  SBP goal 100-150mm Hg  ENDO:  Blood sugar goals 140-180 mg/dL  GI:  PPI for GI prophylaxis  DIET: regular diet  RENAL:  IVL  HEM/ONC: Hb stable  VTE Prophylaxis: SCDs, SQL tonight if no plans for surgery, baseline LE Doppler for DVT suspected on admission (brain mass)  ID: afebrile, no leukocytosis  Social: will update family  MISC:  L humeral fracture:  sling, NWB L UE    ATTENDING ATTESTATION:  I was physically present for the key portions of the evaluation and management (E/M) service provided.  I agree with the above history, physical and plan which I have reviewed and edited where appropriate.     Patient not at high risk for neurologic deterioration / death.  Time spent on this noncritically ill patient: 45 minutes spent on total encounter, more than 50% of the visit was spent counseling and/or coordinating care by the attending physician.    Plan discussed with RN, house staff.

## 2018-11-01 NOTE — CONSULT NOTE ADULT - SUBJECTIVE AND OBJECTIVE BOX
Patient is a 77y old  Female who presents with a chief complaint of s/p mechanical fall (2018 13:48)      HPI:  76yo Right handed female s/p fall X2 mechanical on 1028  Denies lose of consciousness, headaches, nausea,vomiting incontinent of stool or bladder    C/O LUE pain Xray revealed humerus fracture  Head CT revealed < from: CT Head No Cont (10.31.18 @ 12:05) >  Impression: No acute intracranial injury. 4.6 cm mildly hyperdense   bilateral parafalcine extra-axial mass along the anterior   interhemispheric fissure.     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 [place] (31 Oct 2018 14:02)      PAST MEDICAL & SURGICAL HISTORY:  Depression  Brain mass  Sleep apnea: uses CPAP  Spinal stenosis  HTN (hypertension)  History of benign breast tumor  History of cervical discectomy:   History of cosmetic surgery  H/O gastric bypass:       FAMILY HISTORY:      SOCIAL HISTORY:  Smoking Status: [ ] Current, [ ] Former, [ ] Never  Pack Years:    MEDICATIONS:  Pulmonary:    Antimicrobials:    Anticoagulants:    Onc:    GI/:  docusate sodium 100 milliGRAM(s) Oral three times a day  pantoprazole    Tablet 40 milliGRAM(s) Oral before breakfast  senna 2 Tablet(s) Oral at bedtime    Endocrine:    Cardiac:  metoprolol tartrate 12.5 milliGRAM(s) Oral two times a day    Other Medications:  acetaminophen   Tablet .. 650 milliGRAM(s) Oral every 6 hours PRN  buPROPion XL . 300 milliGRAM(s) Oral daily  influenza   Vaccine 0.5 milliLiter(s) IntraMuscular once  levETIRAcetam 500 milliGRAM(s) Oral two times a day  ondansetron Injectable 4 milliGRAM(s) IV Push every 6 hours PRN  potassium phosphate / sodium phosphate powder 2 Packet(s) Oral every 2 hours  sodium chloride 0.9%. 1000 milliLiter(s) IV Continuous <Continuous>      Allergies    No Known Allergies    Intolerances        Vital Signs Last 24 Hrs  T(C): 36.7 (2018 22:07), Max: 37.8 (2018 05:27)  T(F): 98 (2018 22:07), Max: 100.1 (2018 05:27)  HR: 76 (2018 13:49) (70 - 78)  BP: 130/56 (2018 13:49) (130/56 - 168/72)  BP(mean): 80 (2018 13:49) (80 - 103)  RR: 16 (2018 13:49) (16 - 20)  SpO2: 92% (2018 13:49) (92% - 95%)    10-31 @ 07: @ 07:00  --------------------------------------------------------  IN: 550 mL / OUT: 700 mL / NET: -150 mL     @ 07: @ 22:10  --------------------------------------------------------  IN: 360 mL / OUT: 100 mL / NET: 260 mL          LABS:      CBC Full  -  ( 2018 12:39 )  WBC Count : 6.8 K/uL  Hemoglobin : 11.6 g/dL  Hematocrit : 35.8 %  Platelet Count - Automated : 191 K/uL  Mean Cell Volume : 92.3 fL  Mean Cell Hemoglobin : 29.9 pg  Mean Cell Hemoglobin Concentration : 32.4 g/dL  Auto Neutrophil # : x  Auto Lymphocyte # : x  Auto Monocyte # : x  Auto Eosinophil # : x  Auto Basophil # : x  Auto Neutrophil % : x  Auto Lymphocyte % : x  Auto Monocyte % : x  Auto Eosinophil % : x  Auto Basophil % : x        137  |  100  |  13  ----------------------------<  124<H>  4.1   |  26  |  0.49<L>    Ca    8.7      2018 12:39  Phos  1.7       Mg     2.0         TPro  6.2  /  Alb  3.5  /  TBili  0.8  /  DBili  x   /  AST  21  /  ALT  17  /  AlkPhos  77  10-    PT/INR - ( 31 Oct 2018 12:06 )   PT: 13.0 sec;   INR: 1.15          PTT - ( 31 Oct 2018 12:06 )  PTT:29.4 sec      Urinalysis Basic - ( 31 Oct 2018 14:46 )    Color: Yellow / Appearance: SL Cloudy / S.015 / pH: x  Gluc: x / Ketone: >=80 mg/dL  / Bili: Small / Urobili: 0.2 E.U./dL   Blood: x / Protein: NEGATIVE mg/dL / Nitrite: NEGATIVE   Leuk Esterase: NEGATIVE / RBC: 5-10 /HPF / WBC 5-10 /HPF   Sq Epi: x / Non Sq Epi: 5-10 /HPF / Bacteria: Present /HPF    < from: Xray Chest 1 View AP- PORTABLE-Urgent (17 @ 11:28) >  HEST 1 VIEW PORT URGENT                           PROCEDURE DATE:  2017                 INTERPRETATION:    PORTABLE CHEST X-RAY    Indication: O2 desat postop spinal fusion.    No prior chest x-rays available for comparison.    Findings: Small amount of atelectasis medially at left base. No pleural   effusion. Cardiomediastinal silhouette suboptimally evaluated in this   projection. Calcified plaque aortic knob. Spinal fusion hardware in   cervical spine is partially visualized. There are surgical clips in the   left upper quadrant. Skin staples are partially visualized to the right   of midline in the abdomen.    Impression: Small atelectasis left base.    < end of copied text >  < from: Echocardiogram (10.31.18 @ 16:49) >    EXAM:  ECHOCARDIOGRAM (CARDIOL)                          PROCEDURE DATE:  10/31/2018          INTERPRETATION:  Patient Height: 162.6 cm  Patient Weight: 74.4 kg  Heart Rate: 71 bpm  BSA: 1.8 m^2  Interpretation Summary  Technically adequate study.The left ventricular cavity size is normal,   mildly   increased wall thickness with hyperdynamic left ventricular systolic   function.   Calculated LVEF 74% by modified Moody's biplane method. No regional   wall   motion abnormalities.  Diastolic indices are indeterminate.The right   ventricle   is normal in size and systolic function.The left atrium is moderately   enlarged. The right atrium appears normal in size.   Visualized portions   of   the aorta appear normal in size.Aortic annular calcification.  Aortic   valve is   trileaflet and mildly thickened. No AS. No AI.  Mild posterior mitral   annular   calcification. Trace MR.The pulmonic valve is not well seen but appears   functionally normal by Doppler profile. Trace AR.  The tricuspid valve   appears   normal with trace TR.Unable to estimate PA systolic pressure in the   setting of   an incomplete TR Doppler profile.  Normal IVC size with respirophasic   variation.No pericardial effusion.No prior study for comparison.    < end of copied text >  < from: US Duplex Venous Lower Ext Complete, Bilateral (10.31.18 @ 17:24) >  EXAM:  US DPLX LWR EXT VEINS COMPL BI                          PROCEDURE DATE:  10/31/2018          INTERPRETATION:    VENOUS DUPLEX DOPPLER OF BOTH LOWER EXTREMITIES dated 10/31/2018 5:24 PM    INDICATION: Brain mass, r/o dvt    TECHNIQUE: Duplex Doppler evaluation including gray-scale ultrasound   imaging, color flow Doppler imaging, and Doppler spectral analysis of the   veins of both lower extremities was performed.     COMPARISON: None    FINDINGS:    The right common femoral vein, deep femoral vein, and proximal femoral   vein as well as saphenofemoral junction are compressible.     The patient refused the rest of the exam because of pain.      IMPRESSION:  Patient refused most of exam because of pain.    The right common femoral vein, deep femoral vein, and proximal femoral   vein as well as saphenofemoral junction are compressible.     < end of copied text >  < from: CT Angio Head w/ IV Cont (18 @ 04:29) >  EXAM:  CT ANGIO BRAIN (W)AW IC                          PROCEDURE DATE:  2018          INTERPRETATION:  Lindy SIMMONS MD, have reviewed the images and the report   and agree with the findings with the additional modification:    Approximately 4.6 cm mildly hyperdense bilateral parafalcine extra-axial   mass along the anterior interhemispheric fissure.  Approximately 0.7 cm extra-axial calcified lesion is seen at the left   inferior temporal lobe.   Approximately 0.7 cm extra-axial calcification seen at the left anterior   temporal convexity.   Approximately 8 mm rounded calcification seen in the region of the left   anterior suprasellar cistern.     EXAM:  CT Angiography Head With Intravenous Contrast    EXAM DATE/TIME:  2018 4:12AM    CLINICAL HISTORY:  77 years old, female; Abnormal findings; Abnormal CT of the head; Patient   HX: Newly diagnosed mass. Follow up examination    TECHNIQUE:  Axial computed tomographic angiography images of the head with   intravenous contrast using CT angiography protocol. Coronal and sagittal   reformatted images were created and reviewed. MIP reconstructed images   were created and reviewed.    COMPARISON:  CT HEAD 10/31/2018 11:54 AM    FINDINGS:  Right internal carotid artery: Unremarkable. Intracranial segment is   patent with no significant  stenosis. No aneurysm.  Right anterior cerebral artery: Unremarkable. No occlusion or significant   stenosis. No aneurysm.  Right middle cerebral artery: Unremarkable. No occlusion or significant   stenosis. No aneurysm.  Right posterior cerebral artery: There is fetal origin of the right   posterior cerebral artery.  Right vertebral artery: Unremarkable. No occlusion or significant   stenosis. No aneurysm.  Left internal carotid artery: Unremarkable. Intracranial segment is   patent with no significant  stenosis. No aneurysm.  Left anterior cerebral artery: Unremarkable. No occlusion or significant   stenosis. No aneurysm.  Left middle cerebral artery: Unremarkable. No occlusion or significant   stenosis. No aneurysm.  Left posterior cerebral artery: Unremarkable. No occlusion or significant   stenosis. No aneurysm.  Left vertebral artery: Unremarkable. No occlusion or significant   stenosis. No aneurysm.  Basilar artery: Unremarkable. No occlusion or significant stenosis. No   aneurysm.  Other vasculature: The bifrontal 36 x 39 x 43 mm mass based on the falx   demonstrates increased  density from 40 units before contrast and 50 units after contrast.   Although meningioma typically  shows more intense enhancement, the location is consistent with this   diagnosis. There is mild  posterior displacement of the corpus callosum and displacement of   vascular structures along the  periphery of the mass.  A second hyperdense 1 cm nodule is seen at the floor of the left middle   fossa probably representing a  calcified meningioma the density remains about 75 units before and after   contrast.    IMPRESSION:  1. The bifrontal 36 x 39 x 43 mm mass based on the falx demonstrates  increased density from 40  units before contrast and 50 units after contrast. Although meningioma   typically shows more intense  enhancement, the location is consistent with this diagnosis. There is   mild posterior displacement of  the corpus callosum and displacement of vascular structures along the   periphery of the mass.  2. A second hyperdense 1 cm nodule is seen at the floor of the left   middle fossa probably  representing a calcified meningioma the density remains about 75 units   before and after contrast.  3. There is fetal origin of the right posterior cerebral artery.    < end of copied text >                RADIOLOGY & ADDITIONAL STUDIES (The following images were personally reviewed):

## 2018-11-01 NOTE — PROGRESS NOTE ADULT - SUBJECTIVE AND OBJECTIVE BOX
=================================  NEUROCRITICAL CARE ATTENDING NOTE  =================================    PETE SNELL   MRN-[id number]  Summary:  77y/F s/p fall.  Head CT showed bilateral parafalcine extra-axial mass.  Overnight Events: No significant events overnight.  REVIEW OF SYSTEMS:  No headaches, no nausea or vomiting; 14 -point review of systems otherwise unremarkable.    Past Medical History: Depression Brain mass Sleep apnea Spinal stenosis HTN (hypertension)  Allergies:  No Known Allergies  Home meds: percocet bystolic daily docusate 100mg TID senna 2mg HS wellbutrin 400mg daily     PHYSICAL EXAMINATION  T(C): 37.1 ( @ 09:00), Max: 37.8 ( @ 05:27) HR: 78 ( @ 10:58) (70 - 78) BP: 147/58 ( @ 10:58) (111/64 - 169/77) RR: 16 ( @ 10:58) (16 - 20) SpO2: 93% ( @ 10:58) (93% - 97%)  NEUROLOGIC EXAMINATION:  Patient is awake, alert, fully oriented, pupils 2mm equal and briskly reactive to light, EOMs intact, moving all 4s  GENERAL:  not intubated, not in cardiorespiratory distress  EENT: anicteric  CARDIOVASC:  (+) S1 S2, normal rate and regular rhythm  PULMONARY:  clear to auscultation bilaterally  ABDOMEN:  soft, nontender, with normoactive bowel sounds  EXTREMITIES:  no edema  SKIN:  no rash    LABS:             11.6   6.8   )-----------( 191      ( 2018 12:39 )             35.8     137  |  100  |  13  ----------------------------<  124<H>  4.1   |  26  |  0.49<L>    Ca    8.7      2018 12:39  Phos  1.7       Mg     2.0         TPro  6.2  /  Alb  3.5  /  TBili  0.8  /  DBili  x   /  AST  21  /  ALT  17  /  AlkPhos  77  10-31    10-31 @ 07:01  -   @ 07:00  IN: 550 mL / OUT: 700 mL / NET: -150 mL    Bacteriology:  CSF studies:  EEG:  Neuroimagin/01 CTA:  bifrontal mass, ?meningioma  10/31 CT head:   bilateral parafalcine extra-axial mass along anterior interhemispheric fissure  Other imaging:  10/31 LE Doppler:  refused exam, R CFV DFV PFV patent  10/31 Xray humerus:  L prox humerus fracture  10/31 Xray ribs: no R rib fracture    MEDICATIONS: bupropion XL 300mg PO daily docusate 100mg TID levetiracetam 500 BID metoprolol 12.5 mg PO BID pantoprazole 40 daily senna 2mg HS     IV FLUIDS: NS@50cc/hr  DRIPS:  DIET: regular diet  Lines:  Wounds:    CODE STATUS:  Full Code                       GOALS OF CARE:  aggressive                      DISPOSITION:  8La

## 2018-11-02 ENCOUNTER — TRANSCRIPTION ENCOUNTER (OUTPATIENT)
Age: 78
End: 2018-11-02

## 2018-11-02 LAB
-  CANDIDA ALBICANS: SIGNIFICANT CHANGE UP
-  CANDIDA GLABRATA: SIGNIFICANT CHANGE UP
-  CANDIDA KRUSEI: SIGNIFICANT CHANGE UP
-  CANDIDA PARAPSILOSIS: SIGNIFICANT CHANGE UP
-  CANDIDA TROPICALIS: SIGNIFICANT CHANGE UP
-  COAGULASE NEGATIVE STAPHYLOCOCCUS: SIGNIFICANT CHANGE UP
-  K. PNEUMONIAE GROUP: SIGNIFICANT CHANGE UP
-  KPC RESISTANCE GENE: SIGNIFICANT CHANGE UP
-  STREPTOCOCCUS SP. (NOT GRP A, B OR S PNEUMONIAE): SIGNIFICANT CHANGE UP
A BAUMANNII DNA SPEC QL NAA+PROBE: SIGNIFICANT CHANGE UP
CULTURE RESULTS: SIGNIFICANT CHANGE UP
E CLOAC COMP DNA BLD POS QL NAA+PROBE: SIGNIFICANT CHANGE UP
E COLI DNA BLD POS QL NAA+NON-PROBE: SIGNIFICANT CHANGE UP
ENTEROCOC DNA BLD POS QL NAA+NON-PROBE: SIGNIFICANT CHANGE UP
ENTEROCOC DNA BLD POS QL NAA+NON-PROBE: SIGNIFICANT CHANGE UP
GP B STREP DNA BLD POS QL NAA+NON-PROBE: SIGNIFICANT CHANGE UP
GRAM STN FLD: SIGNIFICANT CHANGE UP
HAEM INFLU DNA BLD POS QL NAA+NON-PROBE: SIGNIFICANT CHANGE UP
K OXYTOCA DNA BLD POS QL NAA+NON-PROBE: SIGNIFICANT CHANGE UP
L MONOCYTOG DNA BLD POS QL NAA+NON-PROBE: SIGNIFICANT CHANGE UP
METHOD TYPE: SIGNIFICANT CHANGE UP
MRSA SPEC QL CULT: SIGNIFICANT CHANGE UP
MSSA DNA SPEC QL NAA+PROBE: SIGNIFICANT CHANGE UP
N MEN ISLT CULT: SIGNIFICANT CHANGE UP
P AERUGINOSA DNA BLD POS NAA+NON-PROBE: SIGNIFICANT CHANGE UP
PROTEUS SP DNA BLD POS QL NAA+NON-PROBE: SIGNIFICANT CHANGE UP
S MARCESCENS DNA BLD POS NAA+NON-PROBE: SIGNIFICANT CHANGE UP
S PNEUM DNA BLD POS QL NAA+NON-PROBE: SIGNIFICANT CHANGE UP
S PYO DNA BLD POS QL NAA+NON-PROBE: SIGNIFICANT CHANGE UP
SPECIMEN SOURCE: SIGNIFICANT CHANGE UP

## 2018-11-02 PROCEDURE — 99232 SBSQ HOSP IP/OBS MODERATE 35: CPT

## 2018-11-02 PROCEDURE — 99232 SBSQ HOSP IP/OBS MODERATE 35: CPT | Mod: GC

## 2018-11-02 PROCEDURE — 73560 X-RAY EXAM OF KNEE 1 OR 2: CPT | Mod: 26,50

## 2018-11-02 RX ORDER — OXYCODONE AND ACETAMINOPHEN 5; 325 MG/1; MG/1
1 TABLET ORAL EVERY 6 HOURS
Qty: 0 | Refills: 0 | Status: DISCONTINUED | OUTPATIENT
Start: 2018-11-02 | End: 2018-11-07

## 2018-11-02 RX ORDER — OXYCODONE AND ACETAMINOPHEN 5; 325 MG/1; MG/1
2 TABLET ORAL EVERY 6 HOURS
Qty: 0 | Refills: 0 | Status: DISCONTINUED | OUTPATIENT
Start: 2018-11-02 | End: 2018-11-07

## 2018-11-02 RX ORDER — ENOXAPARIN SODIUM 100 MG/ML
40 INJECTION SUBCUTANEOUS AT BEDTIME
Qty: 0 | Refills: 0 | Status: DISCONTINUED | OUTPATIENT
Start: 2018-11-02 | End: 2018-11-07

## 2018-11-02 RX ADMIN — LEVETIRACETAM 500 MILLIGRAM(S): 250 TABLET, FILM COATED ORAL at 15:26

## 2018-11-02 RX ADMIN — ENOXAPARIN SODIUM 40 MILLIGRAM(S): 100 INJECTION SUBCUTANEOUS at 22:12

## 2018-11-02 RX ADMIN — Medication 100 MILLIGRAM(S): at 22:12

## 2018-11-02 RX ADMIN — PANTOPRAZOLE SODIUM 40 MILLIGRAM(S): 20 TABLET, DELAYED RELEASE ORAL at 06:45

## 2018-11-02 RX ADMIN — LEVETIRACETAM 500 MILLIGRAM(S): 250 TABLET, FILM COATED ORAL at 01:37

## 2018-11-02 RX ADMIN — BUPROPION HYDROCHLORIDE 300 MILLIGRAM(S): 150 TABLET, EXTENDED RELEASE ORAL at 11:48

## 2018-11-02 RX ADMIN — Medication 12.5 MILLIGRAM(S): at 06:45

## 2018-11-02 RX ADMIN — SENNA PLUS 2 TABLET(S): 8.6 TABLET ORAL at 22:12

## 2018-11-02 RX ADMIN — Medication 100 MILLIGRAM(S): at 15:26

## 2018-11-02 RX ADMIN — Medication 100 MILLIGRAM(S): at 06:45

## 2018-11-02 RX ADMIN — Medication 12.5 MILLIGRAM(S): at 18:18

## 2018-11-02 NOTE — PROGRESS NOTE ADULT - SUBJECTIVE AND OBJECTIVE BOX
Interval Events: Reviewed  Patient seen and examined at bedside.    Patient is a 77y old  Female who presents with a chief complaint of s/p mechanical fall - humerus fx (02 Nov 2018 10:22)  she is doing well and waiting to discuss with the surgeon    PAST MEDICAL & SURGICAL HISTORY:  Depression  Brain mass  Sleep apnea: uses CPAP  Spinal stenosis  HTN (hypertension)  History of benign breast tumor  History of cervical discectomy: 2012  History of cosmetic surgery  H/O gastric bypass: 2002      MEDICATIONS:  Pulmonary:    Antimicrobials:    Anticoagulants:  enoxaparin Injectable 40 milliGRAM(s) SubCutaneous at bedtime    Cardiac:  metoprolol tartrate 12.5 milliGRAM(s) Oral two times a day      Allergies    No Known Allergies    Intolerances        Vital Signs Last 24 Hrs  T(C): 36.2 (02 Nov 2018 22:30), Max: 36.7 (02 Nov 2018 05:04)  T(F): 97.1 (02 Nov 2018 22:30), Max: 98 (02 Nov 2018 05:04)  HR: 74 (02 Nov 2018 20:45) (70 - 82)  BP: 133/60 (02 Nov 2018 20:45) (133/60 - 176/72)  BP(mean): 86 (02 Nov 2018 20:45) (86 - 103)  RR: 18 (02 Nov 2018 20:45) (16 - 18)  SpO2: 96% (02 Nov 2018 20:45) (93% - 98%)    11-01 @ 07:01  -  11-02 @ 07:00  --------------------------------------------------------  IN: 760 mL / OUT: 100 mL / NET: 660 mL    11-02 @ 07:01  -  11-02 @ 22:59  --------------------------------------------------------  IN: 360 mL / OUT: 100 mL / NET: 260 mL          LABS:      CBC Full  -  ( 02 Nov 2018 11:00 )  WBC Count : 6.1 K/uL  Hemoglobin : 11.4 g/dL  Hematocrit : 35.0 %  Platelet Count - Automated : 187 K/uL  Mean Cell Volume : 92.8 fL  Mean Cell Hemoglobin : 30.2 pg  Mean Cell Hemoglobin Concentration : 32.6 g/dL  Auto Neutrophil # : x  Auto Lymphocyte # : x  Auto Monocyte # : x  Auto Eosinophil # : x  Auto Basophil # : x  Auto Neutrophil % : x  Auto Lymphocyte % : x  Auto Monocyte % : x  Auto Eosinophil % : x  Auto Basophil % : x    11-02    137  |  100  |  13  ----------------------------<  122<H>  4.1   |  28  |  0.48<L>    Ca    8.8      02 Nov 2018 11:00  Phos  2.4     11-02  Mg     2.1     11-02                          RADIOLOGY & ADDITIONAL STUDIES (The following images were personally reviewed):  Roy:                                     No  Urine output:                       adequate  DVT prophylaxis:                 Yes  Flattus:                                  Yes  Bowel movement:              No

## 2018-11-02 NOTE — PHYSICAL THERAPY INITIAL EVALUATION ADULT - ADDITIONAL COMMENTS
Pt lives at home alone with elevator access, no AYESHA. PTA: amb without ad, indep with functional mobility. 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 States has h/o bilat knee arthritis and knees give out unexpectedly. right hand dominant. denies glasses for vision.

## 2018-11-02 NOTE — OCCUPATIONAL THERAPY INITIAL EVALUATION ADULT - GENERAL OBSERVATIONS, REHAB EVAL
Right hand dominant. Patient cleared for Occupational Therapy by STEPH Narayan, patient received supine in stretcher chair in non-acute distress. +Tele, +IV heplock, + left upper extremity bruising/edema with sling. Patient reports left upper extremity pain rated 3/10 at rest, 0/10 post session.

## 2018-11-02 NOTE — OCCUPATIONAL THERAPY INITIAL EVALUATION ADULT - RANGE OF MOTION EXAMINATION, UPPER EXTREMITY
left upper extremity NT-NWB except wrist/digits AROM WFL/Right UE Active ROM was WNL (within normal limits)

## 2018-11-02 NOTE — PHYSICAL THERAPY INITIAL EVALUATION ADULT - GENERAL OBSERVATIONS, REHAB EVAL
Rec'd pt supine on stretcher bed, in no acute distress, +LUE sling, +heplock, cleared for Pt and OOB activity by Neurosurgery del/helga GARCIA and aury CHOI

## 2018-11-02 NOTE — PROGRESS NOTE ADULT - SUBJECTIVE AND OBJECTIVE BOX
HPI:  78yo Right handed female s/p fall X2 mechanical on 1028  Denies lose of consciousness, headaches, nausea,vomiting incontinent of stool or bladder    C/O LUE pain Xray revealed humerus fracture  Head CT revealed < from: CT Head No Cont (10.31.18 @ 12:05) >  Impression: No acute intracranial injury. 4.6 cm mildly hyperdense   bilateral parafalcine extra-axial mass along the anterior   interhemispheric fissure.     OVERNIGHT EVENTS: DAI overnight. Neuro stable.    Vital Signs Last 24 Hrs  T(C): 36.7 (2018 05:04), Max: 37.3 (2018 19:04)  T(F): 98 (2018 05:04), Max: 99.2 (2018 19:04)  HR: 70 (2018 01:40) (64 - 78)  BP: 158/65 (2018 22:37) (130/56 - 158/65)  BP(mean): 93 (2018 22:37) (80 - 93)  RR: 16 (2018 01:40) (16 - 16)  SpO2: 98% (2018 01:40) (92% - 98%)    I&O's Summary    31 Oct 2018 07:01  -  2018 07:00  --------------------------------------------------------  IN: 550 mL / OUT: 700 mL / NET: -150 mL    2018 07:01  -  2018 06:53  --------------------------------------------------------  IN: 760 mL / OUT: 100 mL / NET: 660 mL        PHYSICAL EXAM:  Neurological: AAOx3, FC, speech coherent  CNII-XII: EOM intact, PERRL, face symmetric  Motor: MAEx4 5/5 UE and LE B/L  SILT throughout    TUBES/LINES:  [] Roy  [] Lumbar Drain  [] Wound Drains  [] Others      DIET:  [] NPO  [x] Mechanical  [] Tube feeds    LABS:                        11.6   6.8   )-----------( 191      ( 2018 12:39 )             35.8         137  |  100  |  13  ----------------------------<  124<H>  4.1   |  26  |  0.49<L>    Ca    8.7      2018 12:39  Phos  1.7       Mg     2.0         TPro  6.2  /  Alb  3.5  /  TBili  0.8  /  DBili  x   /  AST  21  /  ALT  17  /  AlkPhos  77  10-31    PT/INR - ( 31 Oct 2018 12:06 )   PT: 13.0 sec;   INR: 1.15          PTT - ( 31 Oct 2018 12:06 )  PTT:29.4 sec  Urinalysis Basic - ( 31 Oct 2018 14:46 )    Color: Yellow / Appearance: SL Cloudy / S.015 / pH: x  Gluc: x / Ketone: >=80 mg/dL  / Bili: Small / Urobili: 0.2 E.U./dL   Blood: x / Protein: NEGATIVE mg/dL / Nitrite: NEGATIVE   Leuk Esterase: NEGATIVE / RBC: 5-10 /HPF / WBC 5-10 /HPF   Sq Epi: x / Non Sq Epi: 5-10 /HPF / Bacteria: Present /HPF      CARDIAC MARKERS ( 31 Oct 2018 12:06 )  x     / <0.01 ng/mL / 237 U/L / x     / 3.0 ng/mL      CAPILLARY BLOOD GLUCOSE          Drug Levels: [] N/A    CSF Analysis: [] N/A      Allergies    No Known Allergies    Intolerances      MEDICATIONS:  Antibiotics:    Neuro:  acetaminophen   Tablet .. 650 milliGRAM(s) Oral every 6 hours PRN  buPROPion XL . 300 milliGRAM(s) Oral daily  levETIRAcetam 500 milliGRAM(s) Oral two times a day  ondansetron Injectable 4 milliGRAM(s) IV Push every 6 hours PRN    Anticoagulation:  enoxaparin Injectable 40 milliGRAM(s) SubCutaneous at bedtime    OTHER:  docusate sodium 100 milliGRAM(s) Oral three times a day  influenza   Vaccine 0.5 milliLiter(s) IntraMuscular once  metoprolol tartrate 12.5 milliGRAM(s) Oral two times a day  pantoprazole    Tablet 40 milliGRAM(s) Oral before breakfast  senna 2 Tablet(s) Oral at bedtime    IVF:    CULTURES:  Culture Results:   No growth at 1 day. (10-31 @ 18:08)  Culture Results:   No growth at 1 day. (10-31 @ 18:08)    RADIOLOGY & ADDITIONAL TESTS:      ASSESSMENT:  77y Female with L humerus fracture s/p fall, CTH findings of brain mass    PROXIMAL HUMERUS FRACTURINTRACRANIAL MASS  Handoff  MEWS Score  Depression  Brain mass  Sleep apnea  Spinal stenosis  HTN (hypertension)  Intracranial mass  Preoperative clearance  Left ventricular hypertrophy  HTN (hypertension)  Sleep apnea  Proximal humerus fracture  Brain mass  History of benign breast tumor  History of cervical discectomy  History of cosmetic surgery  H/O gastric bypass  FALL  90+  Proximal humerus fracture  Weakness      PLAN:  -neuro checks  -pain control  -f/u MRI brain  -continue keppra  -DVT prophylaxis: SCDs, SQL  -PT/OOB  -Dispo pending  -D/w Dr. Carrera and Dr. Escobar

## 2018-11-02 NOTE — PHYSICAL THERAPY INITIAL EVALUATION ADULT - PERTINENT HX OF CURRENT PROBLEM, REHAB EVAL
as per chart 76yo Right handed female s/p fall X2 mechanical on 1028  Denies lose of consciousness, headaches, nausea,vomiting incontinent of stool or bladder    C/O LUE pain Xray revealed humerus fracture, non-op, immobilize in sling as per ortho.

## 2018-11-02 NOTE — DISCHARGE NOTE ADULT - PATIENT PORTAL LINK FT
You can access the [facility] Portal, offered by [facility], by registering with the following website: [email or web address]

## 2018-11-02 NOTE — DISCHARGE NOTE ADULT - HOSPITAL COURSE
HPI:  78yo Right handed female s/p fall X2 mechanical on 1028  Denies lose of consciousness, headaches, nausea,vomiting incontinent of stool or bladder    C/O LUE pain Xray revealed humerus fracture    Hospital Course:  10/31: Patient admitted s/p fall. CT head revealed bifrontal mass  11/2: DAI overnight. Neuro stable.    Patient evaluated by ortho for left humerus fracture who recommended arm immobilization in sling.   Patient evaluated by PT/OT who cleared her for home with no needs.   Patient tolerating PO diet, pain well controlled, neuro stable, ready for discharge today to home with no needs. Patient understands and agrees to discharge plan. HPI:  76yo Right handed female s/p fall X2 mechanical on 1028  Denies lose of consciousness, headaches, nausea,vomiting incontinent of stool or bladder    C/O LUE pain Xray revealed humerus fracture    Hospital Course:  10/31: Patient admitted s/p fall. CT head revealed bifrontal mass.  11/2: DAI overnight. Neuro stable.    Patient evaluated by ortho for left humerus fracture who recommended arm immobilization in sling.   Patient evaluated by PT/OT who cleared her for home with no needs.   Patient tolerating PO diet, pain well controlled, neuro stable, ready for discharge today to home with no needs. Patient understands and agrees to discharge plan. HPI:  78yo Right handed female s/p fall X2 mechanical on 1028  Denies lose of consciousness, headaches, nausea,vomiting incontinent of stool or bladder    C/O LUE pain Xray revealed humerus fracture    Hospital Course:  10/31: Patient admitted s/p fall. CT head revealed 4.6cm bifrontal mass.   11/2: ADI overnight. Neuro stable.    Patient evaluated by orthopedics for left humerus fracture who recommended arm immobilization in sling.   Patient evaluated by PT/OT who cleared her for home with no needs.   Patient tolerating PO diet, pain well controlled, neuro stable. Patient ready for discharge today to home with no needs. Patient understands and agrees to discharge plan. HPI:  78yo Right handed female s/p fall X2 mechanical on 10/28. Denies lose of consciousness, headaches, nausea, vomiting incontinent of stool or bladder. C/O LUE pain. xray revealed humerus fracture.    Hospital Course:  10/31: Patient admitted s/p fall. CT head revealed 4.6cm bifrontal mass.     Patient evaluated by orthopedics for left humerus fracture who recommended arm immobilization in sling.   Patient evaluated by PT/OT who recommend subacute rehab facility.  On 11/7, patient underwent fall while working with PT. Patient was gently lowered to the ground, landing on her left arm and left knee. Repeat humerus and knee xrays performed- stable from previous xrays performed. No no recommendations.  Patient medically optimized for discharge.

## 2018-11-02 NOTE — DISCHARGE NOTE ADULT - CARE PROVIDER_API CALL
Herberth Carrera), Neurosurgery  [address], [place] [facility] [place]  Phone: [phone]  Fax: [phone] Herberth Carrera), Neurosurgery  [address] [address]  Phone: [phone]  Fax: [phone]    Quinn Barr), Orthopaedic Surgery  [address]  [address] [place] [address]  Phone: [phone]  Fax: [phone]

## 2018-11-02 NOTE — DISCHARGE NOTE ADULT - MEDICATION SUMMARY - MEDICATIONS TO TAKE
I will START or STAY ON the medications listed below when I get home from the hospital:    acetaminophen 325 mg oral tablet  -- 2 tab(s) by mouth every 6 hours, As needed, Temp greater or equal to 38C (100.4F), Mild Pain (1 - 3)  -- Indication: For mild pain    oxyCODONE-acetaminophen 5 mg-325 mg oral tablet  -- 1 tab(s) by mouth every 6 hours, As needed, Moderate Pain (4 - 6)  -- Indication: For moderate pain    oxyCODONE-acetaminophen 5 mg-325 mg oral tablet  -- 2 tab(s) by mouth every 6 hours, As needed, Severe Pain (7 - 10)  -- Indication: For Severe pain    enoxaparin  -- 40 milligram(s) subcutaneous once a day (at bedtime)  -- Indication: For anticoagulation    levETIRAcetam 500 mg oral tablet  -- 1 tab(s) by mouth 2 times a day  -- Indication: For antiseizure    ondansetron 2 mg/mL injectable solution  -- 4  injectable every 6 hours, As Needed  -- Indication: For nausea     metoprolol  -- 12.5 milligram(s) by mouth 2 times a day  -- Indication: For HTN (hypertension)    Bystolic 5 mg oral tablet  -- 1 tab(s) by mouth once a day  -- Indication: For HTN (hypertension)    docusate sodium 100 mg oral capsule  -- 1 cap(s) by mouth 3 times a day  -- Indication: For Stool softener    buPROPion 300 mg/24 hours (XL) oral tablet, extended release  -- 1 tab(s) by mouth once a day  -- Indication: For Smoking cessation

## 2018-11-02 NOTE — PROGRESS NOTE ADULT - SUBJECTIVE AND OBJECTIVE BOX
Neurology Follow up note    Name  PETE SNELL    HPI:  78yo Right handed female s/p fall X2 mechanical on 1028  Denies lose of consciousness, headaches, nausea,vomiting incontinent of stool or bladder    C/O LUE pain Xray revealed humerus fracture  Head CT revealed < from: CT Head No Cont (10.31.18 @ 12:05) >  Impression: No acute intracranial injury. 4.6 cm mildly hyperdense   bilateral parafalcine extra-axial mass along the anterior   interhemispheric fissure.     < end of copied text >      PMH: Depression  HTN    PSH  Lumbar and Cervical lami years ago  1977 Right breast mass resected Benign   No treatment    NKDA    Ptives alone Neext of Kin is her sister who resides in [place]    Pt continue to work as a Professor at [employer] (31 Oct 2018 14:02)      Interval History - no new headaches dizziness or weakness - complains of knee pain        REVIEW OF SYSTEMS    Vital Signs Last 24 Hrs  T(C): 36.7 (02 Nov 2018 05:04), Max: 37.3 (01 Nov 2018 19:04)  T(F): 98 (02 Nov 2018 05:04), Max: 99.2 (01 Nov 2018 19:04)  HR: 74 (02 Nov 2018 06:40) (64 - 78)  BP: 176/72 (02 Nov 2018 06:40) (130/56 - 176/72)  BP(mean): 103 (02 Nov 2018 06:40) (80 - 103)  RR: 16 (02 Nov 2018 06:40) (16 - 16)  SpO2: 95% (02 Nov 2018 06:40) (92% - 98%)    Physical Exam-     Mental Status- awake and alert    Cranial Nerves- full EOM    Gait and station- n/a    Motor- moves all 4 extremities    Reflexes- decreased    Sensation- no sensory level    Coordination- no tremors    Vascular - no bruits    Medications  acetaminophen   Tablet .. 650 milliGRAM(s) Oral every 6 hours PRN  buPROPion XL . 300 milliGRAM(s) Oral daily  docusate sodium 100 milliGRAM(s) Oral three times a day  enoxaparin Injectable 40 milliGRAM(s) SubCutaneous at bedtime  influenza   Vaccine 0.5 milliLiter(s) IntraMuscular once  levETIRAcetam 500 milliGRAM(s) Oral two times a day  metoprolol tartrate 12.5 milliGRAM(s) Oral two times a day  ondansetron Injectable 4 milliGRAM(s) IV Push every 6 hours PRN  oxyCODONE    5 mG/acetaminophen 325 mG 1 Tablet(s) Oral every 6 hours PRN  oxyCODONE    5 mG/acetaminophen 325 mG 2 Tablet(s) Oral every 6 hours PRN  pantoprazole    Tablet 40 milliGRAM(s) Oral before breakfast  senna 2 Tablet(s) Oral at bedtime      Lab      Radiology    Assessment- Meningioma and head trauma     Plan as per NS

## 2018-11-02 NOTE — DISCHARGE NOTE ADULT - PLAN OF CARE
Return to regular prehospital activities 76 y/o female with left humerus fracture s/p fall, brain mass on CT head  - Follow up outpatient with Dr. Carrera (Neurosurgery)   - Follow up outpatient with Orthopedics   - Wear sling on left arm for left humerus fracture until Ortho tells you okay to remove   - Please take pain medication as prescribed 76 y/o female with left humerus fracture s/p fall, brain mass on CT head  - Follow up outpatient with Dr. Carrera (Neurosurgery) on Monday (11/5) at 9am  - Follow up outpatient with Orthopedics   - Wear sling on left arm for left humerus fracture until Ortho tells you okay to remove   - Please take pain medication as prescribed  - Please take Keppra 500 BID until Dr. Carrera tells you otherwise 78 y/o female with left humerus fracture s/p fall, brain mass on CT head  - Follow up outpatient with Dr. Carrera (Neurosurgery) on Monday (11/5) at 9am  - Follow up outpatient with Dr. Barr (Orthopedics) in 2 weeks. Call the office to make an appointment.   - Wear sling on left arm for left humerus fracture until Ortho tells you okay to remove   - Please take pain medication as prescribed  - Please take Keppra 500 BID until Dr. Carrera tells you otherwise 76 y/o female with left humerus fracture s/p fall, brain mass on CT head  - Follow up outpatient with Dr. Carrera (Neurosurgery)   - Follow up outpatient with Dr. Barr (Orthopedics) in 2 weeks. Call the office to make an appointment.   - Wear sling on left arm for left humerus fracture until Ortho tells you okay to remove   - Please take pain medication as prescribed  - Please take Keppra 500 BID until Dr. Carrera tells you otherwise

## 2018-11-02 NOTE — OCCUPATIONAL THERAPY INITIAL EVALUATION ADULT - PERTINENT HX OF CURRENT PROBLEM, REHAB EVAL
76yo Right handed female s/p fall X2 mechanical on 1028  Denies lose of consciousness, headaches, nausea,vomiting incontinent of stool or bladder    C/O LUE pain Xray revealed humerus fracture, non-op, immobilize in sling as per ortho.

## 2018-11-02 NOTE — DISCHARGE NOTE ADULT - ADDITIONAL INSTRUCTIONS
Follow up with Dr. Carrera outpatient  Follow up with Orthopedics outpatient  Wear sling as instructed by ortho, wear it at all times until told otherwise by orthopedics  Call the Neurosurgery office at [phone] or return to ED if notice any change in mental status, fever (above 101), chills, night sweats, nausea, vomiting, or worsening headache.  You may be given a narcotic pain reliever such as Percocet (oxycodone-acetaminophen). Please take medication as prescribed. This is for short term use. Avoid use of alcohol when taking these meds. Follow up with Dr. Carrera outpatient on Monday (11/5) at 9am   Follow up with Orthopedics outpatient  Wear sling as instructed by ortho, wear it at all times until told otherwise by orthopedics  Call the Neurosurgery office at [phone] or return to ED if notice any change in mental status, fever (above 101), chills, night sweats, nausea, vomiting, or worsening headache.  You may be given a narcotic pain reliever such as Percocet (oxycodone-acetaminophen). Please take medication as prescribed. This is for short term use. Avoid use of alcohol when taking these meds. Follow up with Dr. Carrera (Neurosurgery) outpatient on Monday (11/5) at 9am   Follow up with Dr. Barr (Orthopedics) outpatient in 2 weeks. Call the office to make an appointment   Wear sling as instructed by ortho, wear it at all times until told otherwise by orthopedics  Call the Neurosurgery office at [phone] or return to ED if notice any change in mental status, fever (above 101), chills, night sweats, nausea, vomiting, or worsening headache.  You may be given a narcotic pain reliever such as Percocet (oxycodone-acetaminophen). Please take medication as prescribed. This is for short term use. Avoid use of alcohol when taking these meds. Follow up with Dr. Carrera (Neurosurgery)   Follow up with Dr. Barr (Orthopedics) outpatient in 2 weeks. Call the office to make an appointment   Wear sling as instructed by ortho, wear it at all times until told otherwise by orthopedics  Call the Neurosurgery office at [phone] or return to ED if notice any change in mental status, fever (above 101), chills, night sweats, nausea, vomiting, or worsening headache.  You may be given a narcotic pain reliever such as Percocet (oxycodone-acetaminophen). Please take medication as prescribed. This is for short term use. Avoid use of alcohol when taking these meds.

## 2018-11-02 NOTE — PROGRESS NOTE ADULT - SUBJECTIVE AND OBJECTIVE BOX
Ortho Addendum:    Patient verbalized to primary team the desire to travel via plane in 1 week. From Orthopedic perspective would advise against given risks associated with pressure changes/DVT/PE. If decides to travel would recommend ASA daily before, day of, and post travel. Should maintain arm in sling, elevate with pillow. Recommend ortho follow up 10-14 days. Primary team made aware.    Iggy Adler PA-C  [phone]

## 2018-11-02 NOTE — DISCHARGE NOTE ADULT - CARE PLAN
Principal Discharge DX:	Brain mass  Goal:	Return to regular prehospital activities  Assessment and plan of treatment:	78 y/o female with left humerus fracture s/p fall, brain mass on CT head  - Follow up outpatient with Dr. Carrera (Neurosurgery)   - Follow up outpatient with Orthopedics   - Wear sling on left arm for left humerus fracture until Ortho tells you okay to remove   - Please take pain medication as prescribed Principal Discharge DX:	Brain mass  Goal:	Return to regular prehospital activities  Assessment and plan of treatment:	76 y/o female with left humerus fracture s/p fall, brain mass on CT head  - Follow up outpatient with Dr. Carrera (Neurosurgery) on Monday (11/5) at 9am  - Follow up outpatient with Orthopedics   - Wear sling on left arm for left humerus fracture until Ortho tells you okay to remove   - Please take pain medication as prescribed  - Please take Keppra 500 BID until Dr. Carrera tells you otherwise Principal Discharge DX:	Brain mass  Goal:	Return to regular prehospital activities  Assessment and plan of treatment:	78 y/o female with left humerus fracture s/p fall, brain mass on CT head  - Follow up outpatient with Dr. Carrera (Neurosurgery) on Monday (11/5) at 9am  - Follow up outpatient with Dr. Barr (Orthopedics) in 2 weeks. Call the office to make an appointment.   - Wear sling on left arm for left humerus fracture until Ortho tells you okay to remove   - Please take pain medication as prescribed  - Please take Keppra 500 BID until Dr. Carrera tells you otherwise Principal Discharge DX:	Brain mass  Goal:	Return to regular prehospital activities  Assessment and plan of treatment:	76 y/o female with left humerus fracture s/p fall, brain mass on CT head  - Follow up outpatient with Dr. Carrera (Neurosurgery)   - Follow up outpatient with Dr. Barr (Orthopedics) in 2 weeks. Call the office to make an appointment.   - Wear sling on left arm for left humerus fracture until Ortho tells you okay to remove   - Please take pain medication as prescribed  - Please take Keppra 500 BID until Dr. Carrera tells you otherwise

## 2018-11-02 NOTE — PROGRESS NOTE ADULT - SUBJECTIVE AND OBJECTIVE BOX
=================================  NEUROCRITICAL CARE ATTENDING NOTE  =================================    PETE SNELL   MRN-[id number]  Summary:  77y/F s/p fall.  Head CT showed bilateral parafalcine extra-axial mass.  Overnight Events: No significant events overnight.  REVIEW OF SYSTEMS:  No headaches, no nausea or vomiting; 14 -point review of systems otherwise unremarkable.    Past Medical History: Depression Brain mass Sleep apnea Spinal stenosis HTN (hypertension)  Allergies:  No Known Allergies  Home meds: percocet bystolic daily docusate 100mg TID senna 2mg HS wellbutrin 400mg daily     PHYSICAL EXAMINATION  T(C): 36.7 ( @ 05:04), Max: 37.3 ( @ 19:04) HR: 74 ( @ 06:40) (64 - 78) BP: 176/72 ( @ 06:40) (130/56 - 176/72) RR: 16 ( @ 06:40) (16 - 16) SpO2: 95% ( @ 06:40) (92% - 98%)   NEUROLOGIC EXAMINATION:  Patient is awake, alert, fully oriented, pupils 2mm equal and briskly reactive to light, EOMs intact, moving all 4s  GENERAL:  not intubated, not in cardiorespiratory distress  EENT: anicteric  CARDIOVASC:  (+) S1 S2, normal rate and regular rhythm  PULMONARY:  clear to auscultation bilaterally  ABDOMEN:  soft, nontender, with normoactive bowel sounds  EXTREMITIES:  no edema  SKIN:  no rash    LABS:    pending AM labs     @ 07:01  -   @ 07:00  IN: 760 mL / OUT: 100 mL / NET: 660 mL    Bacteriology:  CSF studies:  EEG:  Neuroimagin/01 MRI:  meningioma, anterior interhemispheric fissure bifrontal, mass effect on genu of CC, frontal horns of laterval ventricles; 3 other meningiomas    CTA:  bifrontal mass, ?meningioma  10/31 CT head:   bilateral parafalcine extra-axial mass along anterior interhemispheric fissure  Other imaging:  10/31 LE Doppler:  refused exam, R CFV DFV PFV patent  10/31 Xray humerus:  L prox humerus fracture  10/31 Xray ribs: no R rib fracture    MEDICATIONS: bupropion XL 300mg PO daily docusate 100mg TID levetiracetam 500 BID metoprolol 12.5 mg PO BID pantoprazole 40 daily senna 2mg HS     IV FLUIDS: NS@50cc/hr  DRIPS:  DIET: regular diet  Lines:  Wounds:    CODE STATUS:  Full Code                       GOALS OF CARE:  aggressive                      DISPOSITION:  8La =================================  NEUROCRITICAL CARE ATTENDING NOTE  =================================    PETE SNELL   MRN-[id number]  Summary:  77y/F s/p fall.  Head CT showed bilateral parafalcine extra-axial mass.  Overnight Events: No significant events overnight.  REVIEW OF SYSTEMS:  No headaches, no nausea or vomiting; 14 -point review of systems otherwise unremarkable.    Past Medical History: Depression Brain mass Sleep apnea Spinal stenosis HTN (hypertension)  Allergies:  No Known Allergies  Home meds: percocet bystolic daily docusate 100mg TID senna 2mg HS wellbutrin 400mg daily     PHYSICAL EXAMINATION  T(C): 36.7 ( @ 05:04), Max: 37.3 ( @ 19:04) HR: 74 ( @ 06:40) (64 - 78) BP: 176/72 ( @ 06:40) (130/56 - 176/72) RR: 16 ( @ 06:40) (16 - 16) SpO2: 95% ( @ 06:40) (92% - 98%)   NEUROLOGIC EXAMINATION:  Patient is awake, alert, fully oriented, pupils 2mm equal and briskly reactive to light, EOMs intact, moving all 4s  GENERAL:  not intubated, not in cardiorespiratory distress  EENT: anicteric  CARDIOVASC:  (+) S1 S2, normal rate and regular rhythm  PULMONARY:  clear to auscultation bilaterally  ABDOMEN:  soft, nontender, with normoactive bowel sounds  EXTREMITIES:  no edema  SKIN:  no rash    LABS:    pending AM labs     @ 07:01  -   @ 07:00  IN: 760 mL / OUT: 100 mL / NET: 660 mL    Bacteriology:  CSF studies:  EEG:  Neuroimagin/01 MRI:  meningioma, anterior interhemispheric fissure bifrontal, mass effect on genu of CC, frontal horns of laterval ventricles; 3 other meningiomas    CTA:  bifrontal mass, ?meningioma  10/31 CT head:   bilateral parafalcine extra-axial mass along anterior interhemispheric fissure  Other imaging:  10/31 LE Doppler:  refused exam, R CFV DFV PFV patent  10/31 Xray humerus:  L prox humerus fracture  10/31 Xray ribs: no R rib fracture    MEDICATIONS: bupropion XL 300mg daily docusate 100 TID SQL 40 HS levetiracetam 500 BID metoprolol 12.5mg BID pantoprazole 40 daily senna 2mg HS    IV FLUIDS: IVL   DRIPS:  DIET: regular diet  Lines:  Wounds:    CODE STATUS:  Full Code                       GOALS OF CARE:  aggressive                      DISPOSITION:  8La

## 2018-11-02 NOTE — PROGRESS NOTE ADULT - ASSESSMENT
77y/F with  1.  multiple intracranial meningiomas, largest one in anterior interhemispheric fissure, with brain compression  2.  Fracture, L proximal humerus   3.  depression  4.  h/o sleep apnea, spinal stenosis  5.  HTN    PLAN:   NEURO: neurochecks q4h, PRN pain meds with Tylenol  meningioma:  MRI kalyan  seizure prophylaxis: levetiracetam 500mg PO BID per neurosurgery  REHAB:  physical therapy evaluation and management    EARLY MOB:  ambulate as tolerated    PULM:  Room air, incentive spirometry  CARDIO:  SBP goal 100-150mm Hg  ENDO:  Blood sugar goals 140-180 mg/dL  GI:  PPI for GI prophylaxis  DIET: regular diet  RENAL:  IVL  HEM/ONC: Hb stable  VTE Prophylaxis: SCDs, SQL tonight if no plans for surgery, baseline LE Doppler for DVT suspected on admission (brain mass)  ID: afebrile, no leukocytosis  Social: will update family  MISC:  L humeral fracture:  sling, NWB L UE    ATTENDING ATTESTATION:  I was physically present for the key portions of the evaluation and management (E/M) service provided.  I agree with the above history, physical and plan which I have reviewed and edited where appropriate.     Patient not at high risk for neurologic deterioration / death.  Time spent on this noncritically ill patient: 45 minutes spent on total encounter, more than 50% of the visit was spent counseling and/or coordinating care by the attending physician.    Plan discussed with RN, house staff. 77y/F with  1.  multiple intracranial meningiomas, largest one in anterior interhemispheric fissure, with brain compression  2.  Fracture, L proximal humerus   3.  depression  4.  h/o sleep apnea, spinal stenosis  5.  HTN    PLAN:   NEURO: neurochecks q4h, PRN pain meds with Tylenol  meningioma:  f/u neurosurgery plans  seizure prophylaxis: recommend d/c if ok with neurosurgery  REHAB:  physical therapy evaluation and management    EARLY MOB:  ambulate as tolerated    PULM:  Room air, incentive spirometry  CARDIO:  SBP goal 100-150mm Hg  ENDO:  Blood sugar goals 140-180 mg/dL  GI:  d/c PPI  DIET: regular diet  RENAL: IVL  HEM/ONC: Hb stable  VTE Prophylaxis: SCDs, SQL, f/u baseline doppler if not for d/c   ID: afebrile, no leukocytosis  Social: will update family  MISC:  L humeral fracture:  CHANDRA mitchell L UE  DISPO planning    ATTENDING ATTESTATION:  I was physically present for the key portions of the evaluation and management (E/M) service provided.  I agree with the above history, physical and plan which I have reviewed and edited where appropriate.     Patient not at high risk for neurologic deterioration / death.  Time spent on this noncritically ill patient: 45 minutes spent on total encounter, more than 50% of the visit was spent counseling and/or coordinating care by the attending physician.    Plan discussed with RN, house staff.

## 2018-11-02 NOTE — DISCHARGE NOTE ADULT - CARE PROVIDERS DIRECT ADDRESSES
,[email or web address]

## 2018-11-03 LAB
ANION GAP SERPL CALC-SCNC: 11 MMOL/L — SIGNIFICANT CHANGE UP (ref 5–17)
BUN SERPL-MCNC: 15 MG/DL — SIGNIFICANT CHANGE UP (ref 7–23)
CALCIUM SERPL-MCNC: 8.5 MG/DL — SIGNIFICANT CHANGE UP (ref 8.4–10.5)
CHLORIDE SERPL-SCNC: 102 MMOL/L — SIGNIFICANT CHANGE UP (ref 96–108)
CO2 SERPL-SCNC: 25 MMOL/L — SIGNIFICANT CHANGE UP (ref 22–31)
CREAT SERPL-MCNC: 0.57 MG/DL — SIGNIFICANT CHANGE UP (ref 0.5–1.3)
GLUCOSE SERPL-MCNC: 115 MG/DL — HIGH (ref 70–99)
HCT VFR BLD CALC: 33.8 % — LOW (ref 34.5–45)
HGB BLD-MCNC: 11.2 G/DL — LOW (ref 11.5–15.5)
MCHC RBC-ENTMCNC: 30.5 PG — SIGNIFICANT CHANGE UP (ref 27–34)
MCHC RBC-ENTMCNC: 33.1 G/DL — SIGNIFICANT CHANGE UP (ref 32–36)
MCV RBC AUTO: 92.1 FL — SIGNIFICANT CHANGE UP (ref 80–100)
PLATELET # BLD AUTO: 186 K/UL — SIGNIFICANT CHANGE UP (ref 150–400)
POTASSIUM SERPL-MCNC: 4.1 MMOL/L — SIGNIFICANT CHANGE UP (ref 3.5–5.3)
POTASSIUM SERPL-SCNC: 4.1 MMOL/L — SIGNIFICANT CHANGE UP (ref 3.5–5.3)
RBC # BLD: 3.67 M/UL — LOW (ref 3.8–5.2)
RBC # FLD: 13.1 % — SIGNIFICANT CHANGE UP (ref 10.3–16.9)
SODIUM SERPL-SCNC: 138 MMOL/L — SIGNIFICANT CHANGE UP (ref 135–145)
WBC # BLD: 6 K/UL — SIGNIFICANT CHANGE UP (ref 3.8–10.5)
WBC # FLD AUTO: 6 K/UL — SIGNIFICANT CHANGE UP (ref 3.8–10.5)

## 2018-11-03 PROCEDURE — 99232 SBSQ HOSP IP/OBS MODERATE 35: CPT

## 2018-11-03 RX ADMIN — PANTOPRAZOLE SODIUM 40 MILLIGRAM(S): 20 TABLET, DELAYED RELEASE ORAL at 06:38

## 2018-11-03 RX ADMIN — LEVETIRACETAM 500 MILLIGRAM(S): 250 TABLET, FILM COATED ORAL at 13:03

## 2018-11-03 RX ADMIN — Medication 100 MILLIGRAM(S): at 06:38

## 2018-11-03 RX ADMIN — Medication 12.5 MILLIGRAM(S): at 17:44

## 2018-11-03 RX ADMIN — ENOXAPARIN SODIUM 40 MILLIGRAM(S): 100 INJECTION SUBCUTANEOUS at 23:15

## 2018-11-03 RX ADMIN — BUPROPION HYDROCHLORIDE 300 MILLIGRAM(S): 150 TABLET, EXTENDED RELEASE ORAL at 12:57

## 2018-11-03 RX ADMIN — Medication 12.5 MILLIGRAM(S): at 06:38

## 2018-11-03 RX ADMIN — Medication 100 MILLIGRAM(S): at 13:03

## 2018-11-03 RX ADMIN — LEVETIRACETAM 500 MILLIGRAM(S): 250 TABLET, FILM COATED ORAL at 03:23

## 2018-11-03 NOTE — PROGRESS NOTE ADULT - SUBJECTIVE AND OBJECTIVE BOX
=================================  NEUROCRITICAL CARE ATTENDING NOTE  =================================    PETE SNELL   MRN-[id number]  Summary:  77y/F s/p fall.  Head CT showed bilateral parafalcine extra-axial mass.  Overnight Events: No significant events overnight.  REVIEW OF SYSTEMS:  No headaches, no nausea or vomiting; 14 -point review of systems otherwise unremarkable.    Past Medical History: Depression Brain mass Sleep apnea Spinal stenosis HTN (hypertension)  Allergies:  No Known Allergies  Home meds: percocet bystolic daily docusate 100mg TID senna 2mg HS wellbutrin 400mg daily     PHYSICAL EXAMINATION  T(C): 36.7 ( @ 10:03), Max: 36.7 ( @ 10:03) HR: 72 ( @ 05:20) (72 - 82) BP: 129/58 ( @ 10:03) (129/58 - 174/72) RR: 18 ( @ 10:03) (16 - 18) SpO2: 98% ( @ 10:03) (95% - 98%)   NEUROLOGIC EXAMINATION:  Patient is awake, alert, fully oriented, pupils 2mm equal and briskly reactive to light, EOMs intact, moving all 4s  GENERAL:  not intubated, not in cardiorespiratory distress  EENT: anicteric  CARDIOVASC:  (+) S1 S2, normal rate and regular rhythm  PULMONARY:  clear to auscultation bilaterally  ABDOMEN:  soft, nontender, with normoactive bowel sounds  EXTREMITIES:  no edema  SKIN:  no rash    LABS:             11.2   6.0   )-----------( 186      ( 2018 05:34 )             33.8     138  |  102  |  15  ----------------------------<  115<H>  4.1   |  25  |  0.57    Ca    8.5      2018 05:34  Phos  2.4       Mg     2.1      @ 07:01  -   @ 07:00  IN: 360 mL / OUT: 500 mL / NET: -140 mL    Bacteriology:  CSF studies:  EEG:  Neuroimagin/01 MRI:  meningioma, anterior interhemispheric fissure bifrontal, mass effect on genu of CC, frontal horns of laterval ventricles; 3 other meningiomas    CTA:  bifrontal mass, ?meningioma  10/31 CT head:   bilateral parafalcine extra-axial mass along anterior interhemispheric fissure  Other imagin/02 Knee Xray bilateral: no fractures  10/31 LE Doppler:  refused exam, R CFV DFV PFV patent  10/31 Xray humerus:  L prox humerus fracture  10/31 Xray ribs: no R rib fracture    MEDICATIONS: bupropion XL 300mg daily docusate 100 TID SQL 40 HS levetiracetam 500 BID metoprolol 12.5m BID percocet pantoprzole 40 daily senna 2mg HS    IV FLUIDS: IVL   DRIPS:  DIET: regular diet  Lines:  Wounds:    CODE STATUS:  Full Code                       GOALS OF CARE:  aggressive                      DISPOSITION:  8La

## 2018-11-03 NOTE — PROGRESS NOTE ADULT - SUBJECTIVE AND OBJECTIVE BOX
HPI:  78yo Right handed female s/p fall X2 mechanical on 1028  Denies lose of consciousness, headaches, nausea,vomiting incontinent of stool or bladder    C/O LUE pain Xray revealed humerus fracture  Head CT revealed < from: CT Head No Cont (10.31.18 @ 12:05) >  Impression: No acute intracranial injury. 4.6 cm mildly hyperdense   bilateral parafalcine extra-axial mass along the anterior   interhemispheric fissure.     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)    OVERNIGHT EVENTS:    Hospital Course:      Vital Signs Last 24 Hrs  T(C): 36.6 (03 Nov 2018 06:07), Max: 36.6 (03 Nov 2018 06:07)  T(F): 97.9 (03 Nov 2018 06:07), Max: 97.9 (03 Nov 2018 06:07)  HR: 72 (03 Nov 2018 05:20) (70 - 82)  BP: 148/67 (03 Nov 2018 05:20) (133/60 - 174/72)  BP(mean): 96 (03 Nov 2018 05:20) (86 - 103)  RR: 18 (03 Nov 2018 05:20) (16 - 18)  SpO2: 98% (03 Nov 2018 05:20) (93% - 98%)    I&O's Summary    02 Nov 2018 07:01  -  03 Nov 2018 07:00  --------------------------------------------------------  IN: 360 mL / OUT: 500 mL / NET: -140 mL        PHYSICAL EXAM:  Gen:  Neurological:  CN II-XII  Motor exam:  Cardiovascular:  Respiratory:  Gastrointestinal:  Incision/Wound:    TUBES/LINES:  [] Roy  [] Lumbar Drain  [] Wound Drains  [] Others      DIET:  [] NPO  [] Mechanical  [] Tube feeds    LABS:                        11.2   6.0   )-----------( 186      ( 03 Nov 2018 05:34 )             33.8     11-03    138  |  102  |  15  ----------------------------<  115<H>  4.1   |  25  |  0.57    Ca    8.5      03 Nov 2018 05:34  Phos  2.4     11-02  Mg     2.1     11-02              CAPILLARY BLOOD GLUCOSE          Drug Levels: [] N/A    CSF Analysis: [] N/A      Allergies    No Known Allergies    Intolerances      MEDICATIONS:  Antibiotics:    Neuro:  acetaminophen   Tablet .. 650 milliGRAM(s) Oral every 6 hours PRN  buPROPion XL . 300 milliGRAM(s) Oral daily  levETIRAcetam 500 milliGRAM(s) Oral two times a day  ondansetron Injectable 4 milliGRAM(s) IV Push every 6 hours PRN  oxyCODONE    5 mG/acetaminophen 325 mG 1 Tablet(s) Oral every 6 hours PRN  oxyCODONE    5 mG/acetaminophen 325 mG 2 Tablet(s) Oral every 6 hours PRN    Anticoagulation:  enoxaparin Injectable 40 milliGRAM(s) SubCutaneous at bedtime    OTHER:  docusate sodium 100 milliGRAM(s) Oral three times a day  influenza   Vaccine 0.5 milliLiter(s) IntraMuscular once  metoprolol tartrate 12.5 milliGRAM(s) Oral two times a day  pantoprazole    Tablet 40 milliGRAM(s) Oral before breakfast  senna 2 Tablet(s) Oral at bedtime    IVF:    CULTURES:  Culture Results:   Culture in progress (10-31 @ 18:08)  Culture Results:   No growth at 2 days. (10-31 @ 18:08)    RADIOLOGY & ADDITIONAL TESTS:      ASSESSMENT:  77y Female s/p    PLAN:  NEURO:    CARDIOVASCULAR:    PULMONARY:    RENAL:    GI:    HEME:    ID:    ENDO:    DVT PROPHYLAXIS: [] Venodynes [] Heparin/Lovenox  PT/OT/OOB    DISPOSITION: HPI:  78yo Right handed female s/p fall X2 mechanical on 1028  Denies lose of consciousness, headaches, nausea,vomiting incontinent of stool or bladder    C/O LUE pain Xray revealed humerus fracture  Head CT revealed < from: CT Head No Cont (10.31.18 @ 12:05) >  Impression: No acute intracranial injury. 4.6 cm mildly hyperdense   bilateral parafalcine extra-axial mass along the anterior   interhemispheric fissure.     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 Neuro stable.  11/3: No acute events overnight, neuro stable    Vital Signs Last 24 Hrs  T(C): 36.6 (03 Nov 2018 06:07), Max: 36.6 (03 Nov 2018 06:07)  T(F): 97.9 (03 Nov 2018 06:07), Max: 97.9 (03 Nov 2018 06:07)  HR: 72 (03 Nov 2018 05:20) (70 - 82)  BP: 148/67 (03 Nov 2018 05:20) (133/60 - 174/72)  BP(mean): 96 (03 Nov 2018 05:20) (86 - 103)  RR: 18 (03 Nov 2018 05:20) (16 - 18)  SpO2: 98% (03 Nov 2018 05:20) (93% - 98%)    I&O's Summary    02 Nov 2018 07:01  -  03 Nov 2018 07:00  --------------------------------------------------------  IN: 360 mL / OUT: 500 mL / NET: -140 mL      PHYSICAL EXAM:  Gen: Laying in hospital bed, NAD. LUE in sling (humerus fx)  Neurological: AA+Ox3, opens eyes spontaneously, following commands  CN II-XII PERRL, EOMI  Motor exam: MAEx4, LUE weakness due to pain, otherwise 5/5 strength throughout  SILT in UE and LE b/l  Cardiovascular: regular rate and rhythm  Respiratory: regular rate and rhythm   Gastrointestinal: soft, nontender, nondistended  Incision/Wound: C/D/I    TUBES/LINES:  [] Roy  [] Lumbar Drain  [] Wound Drains  [] Others    DIET:  [] NPO  [x] Mechanical  [] Tube feeds    LABS:                        11.2   6.0   )-----------( 186      ( 03 Nov 2018 05:34 )             33.8     11-03    138  |  102  |  15  ----------------------------<  115<H>  4.1   |  25  |  0.57    Ca    8.5      03 Nov 2018 05:34  Phos  2.4     11-02  Mg     2.1     11-02              CAPILLARY BLOOD GLUCOSE          Drug Levels: [] N/A    CSF Analysis: [] N/A      Allergies    No Known Allergies    Intolerances      MEDICATIONS:  Antibiotics:    Neuro:  acetaminophen   Tablet .. 650 milliGRAM(s) Oral every 6 hours PRN  buPROPion XL . 300 milliGRAM(s) Oral daily  levETIRAcetam 500 milliGRAM(s) Oral two times a day  ondansetron Injectable 4 milliGRAM(s) IV Push every 6 hours PRN  oxyCODONE    5 mG/acetaminophen 325 mG 1 Tablet(s) Oral every 6 hours PRN  oxyCODONE    5 mG/acetaminophen 325 mG 2 Tablet(s) Oral every 6 hours PRN    Anticoagulation:  enoxaparin Injectable 40 milliGRAM(s) SubCutaneous at bedtime    OTHER:  docusate sodium 100 milliGRAM(s) Oral three times a day  influenza   Vaccine 0.5 milliLiter(s) IntraMuscular once  metoprolol tartrate 12.5 milliGRAM(s) Oral two times a day  pantoprazole    Tablet 40 milliGRAM(s) Oral before breakfast  senna 2 Tablet(s) Oral at bedtime    IVF:    CULTURES:  Culture Results:   Culture in progress (10-31 @ 18:08)  Culture Results:   No growth at 2 days. (10-31 @ 18:08)    RADIOLOGY & ADDITIONAL TESTS:      ASSESSMENT:  77y Female s/p fall, CT head finding of brain mass, Left humerus fracture on x ray     PLAN:  - Neuro checks  - vitals checks  - pain control with Percocet PRN, Tylenol PRN   - cont Keppra 500 BID  - cont Bupropion   - cont Lopressor   - regular diet  - bowel regimen: colace, senna   - DVT PROPHYLAXIS: [x] Venodynes [x] Heparin/Lovenox  - PT/OT/OOB    DISPOSITION: stepdown status, full code, dispo pending    d/w Dr. Carrera

## 2018-11-03 NOTE — PROGRESS NOTE ADULT - ASSESSMENT
77y/F with  1.  multiple intracranial meningiomas, largest one in anterior interhemispheric fissure, with brain compression  2.  Fracture, L proximal humerus   3.  depression  4.  h/o sleep apnea, spinal stenosis  5.  HTN    PLAN:   NEURO: neurochecks q4h, PRN pain meds with Tylenol  meningioma:  outpatient f/u  seizure prophylaxis: recommend d/c if ok with neurosurgery  REHAB:  physical therapy evaluation and management    EARLY MOB:  ambulate as tolerated    PULM:  Room air, incentive spirometry  CARDIO:  SBP goal 100-150mm Hg  ENDO:  Blood sugar goals 140-180 mg/dL  GI:  d/c PPI  DIET: regular diet  RENAL: IVL  HEM/ONC: Hb stable  VTE Prophylaxis: SCDs, SQL  ID: afebrile, no leukocytosis  Social: will update family  MISC:  L humeral fracture:  sling, NWB L UE  DISPO: rehab    ATTENDING ATTESTATION:  I was physically present for the key portions of the evaluation and management (E/M) service provided.  I agree with the above history, physical and plan which I have reviewed and edited where appropriate.     Patient not at high risk for neurologic deterioration / death.  Time spent on this noncritically ill patient: 45 minutes spent on total encounter, more than 50% of the visit was spent counseling and/or coordinating care by the attending physician.    Plan discussed with RN, house staff.

## 2018-11-04 LAB
-  ERYTHROMYCIN: SIGNIFICANT CHANGE UP
-  GENTAMICIN: SIGNIFICANT CHANGE UP
-  PENICILLIN: SIGNIFICANT CHANGE UP
-  VANCOMYCIN: SIGNIFICANT CHANGE UP
ANION GAP SERPL CALC-SCNC: 11 MMOL/L — SIGNIFICANT CHANGE UP (ref 5–17)
BUN SERPL-MCNC: 11 MG/DL — SIGNIFICANT CHANGE UP (ref 7–23)
CALCIUM SERPL-MCNC: 8.8 MG/DL — SIGNIFICANT CHANGE UP (ref 8.4–10.5)
CHLORIDE SERPL-SCNC: 99 MMOL/L — SIGNIFICANT CHANGE UP (ref 96–108)
CO2 SERPL-SCNC: 28 MMOL/L — SIGNIFICANT CHANGE UP (ref 22–31)
CREAT SERPL-MCNC: 0.5 MG/DL — SIGNIFICANT CHANGE UP (ref 0.5–1.3)
GLUCOSE SERPL-MCNC: 102 MG/DL — HIGH (ref 70–99)
HCT VFR BLD CALC: 36 % — SIGNIFICANT CHANGE UP (ref 34.5–45)
HGB BLD-MCNC: 11.6 G/DL — SIGNIFICANT CHANGE UP (ref 11.5–15.5)
MAGNESIUM SERPL-MCNC: 2 MG/DL — SIGNIFICANT CHANGE UP (ref 1.6–2.6)
MCHC RBC-ENTMCNC: 29.5 PG — SIGNIFICANT CHANGE UP (ref 27–34)
MCHC RBC-ENTMCNC: 32.2 G/DL — SIGNIFICANT CHANGE UP (ref 32–36)
MCV RBC AUTO: 91.6 FL — SIGNIFICANT CHANGE UP (ref 80–100)
METHOD TYPE: SIGNIFICANT CHANGE UP
PHOSPHATE SERPL-MCNC: 3.1 MG/DL — SIGNIFICANT CHANGE UP (ref 2.5–4.5)
PLATELET # BLD AUTO: 213 K/UL — SIGNIFICANT CHANGE UP (ref 150–400)
POTASSIUM SERPL-MCNC: 4.2 MMOL/L — SIGNIFICANT CHANGE UP (ref 3.5–5.3)
POTASSIUM SERPL-SCNC: 4.2 MMOL/L — SIGNIFICANT CHANGE UP (ref 3.5–5.3)
RBC # BLD: 3.93 M/UL — SIGNIFICANT CHANGE UP (ref 3.8–5.2)
RBC # FLD: 13.1 % — SIGNIFICANT CHANGE UP (ref 10.3–16.9)
SODIUM SERPL-SCNC: 138 MMOL/L — SIGNIFICANT CHANGE UP (ref 135–145)
WBC # BLD: 5.4 K/UL — SIGNIFICANT CHANGE UP (ref 3.8–10.5)
WBC # FLD AUTO: 5.4 K/UL — SIGNIFICANT CHANGE UP (ref 3.8–10.5)

## 2018-11-04 RX ADMIN — Medication 12.5 MILLIGRAM(S): at 17:19

## 2018-11-04 RX ADMIN — Medication 12.5 MILLIGRAM(S): at 05:31

## 2018-11-04 RX ADMIN — BUPROPION HYDROCHLORIDE 300 MILLIGRAM(S): 150 TABLET, EXTENDED RELEASE ORAL at 13:00

## 2018-11-04 RX ADMIN — LEVETIRACETAM 500 MILLIGRAM(S): 250 TABLET, FILM COATED ORAL at 13:00

## 2018-11-04 RX ADMIN — ENOXAPARIN SODIUM 40 MILLIGRAM(S): 100 INJECTION SUBCUTANEOUS at 21:09

## 2018-11-04 RX ADMIN — LEVETIRACETAM 500 MILLIGRAM(S): 250 TABLET, FILM COATED ORAL at 05:31

## 2018-11-04 NOTE — PROGRESS NOTE ADULT - SUBJECTIVE AND OBJECTIVE BOX
HPI:  78yo Right handed female s/p fall X2 mechanical on 1028  Denies lose of consciousness, headaches, nausea,vomiting incontinent of stool or bladder    C/O LUE pain Xray revealed humerus fracture  Head CT revealed < from: CT Head No Cont (10.31.18 @ 12:05) >  Impression: No acute intracranial injury. 4.6 cm mildly hyperdense   bilateral parafalcine extra-axial mass along the anterior   interhemispheric fissure.     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 Neuro stable.  11/3: No acute events overnight, neuro stable  11/4: No acute events overnight, neuro stable. Pending PETRA      OVERNIGHT EVENTS:  Vital Signs Last 24 Hrs  T(C): 36.8 (04 Nov 2018 08:40), Max: 36.9 (03 Nov 2018 14:43)  T(F): 98.2 (04 Nov 2018 08:40), Max: 98.5 (03 Nov 2018 17:53)  HR: 56 (04 Nov 2018 08:40) (56 - 78)  BP: 158/53 (04 Nov 2018 08:40) (137/80 - 168/87)  BP(mean): --  RR: 16 (04 Nov 2018 08:40) (16 - 17)  SpO2: 97% (04 Nov 2018 08:40) (96% - 97%)    I&O's Summary    03 Nov 2018 08:01  -  04 Nov 2018 07:00  --------------------------------------------------------  IN: 1095 mL / OUT: 300 mL / NET: 795 mL        PHYSICAL EXAM:  Neurological:  AAOx3, NAD, coherent speech, following commands  CNII-XII grossly intact  MAEx4, strength 5/5 UE and LE b/l, no drift  +LUE sling (humerus fracture)  SILT throughout b/l      TUBES/LINES:  [] Roy  [] Lumbar Drain  [] Wound Drains  [] Others      DIET:  [] NPO  [x] Mechanical  [] Tube feeds    LABS:                        11.6   5.4   )-----------( 213      ( 04 Nov 2018 07:32 )             36.0     11-04    138  |  99  |  11  ----------------------------<  102<H>  4.2   |  28  |  0.50    Ca    8.8      04 Nov 2018 07:32  Phos  3.1     11-04  Mg     2.0     11-04              CAPILLARY BLOOD GLUCOSE          Drug Levels: [] N/A    CSF Analysis: [] N/A      Allergies    No Known Allergies    Intolerances      MEDICATIONS:  Antibiotics:    Neuro:  acetaminophen   Tablet .. 650 milliGRAM(s) Oral every 6 hours PRN  buPROPion XL . 300 milliGRAM(s) Oral daily  levETIRAcetam 500 milliGRAM(s) Oral two times a day  ondansetron Injectable 4 milliGRAM(s) IV Push every 6 hours PRN  oxyCODONE    5 mG/acetaminophen 325 mG 1 Tablet(s) Oral every 6 hours PRN  oxyCODONE    5 mG/acetaminophen 325 mG 2 Tablet(s) Oral every 6 hours PRN    Anticoagulation:  enoxaparin Injectable 40 milliGRAM(s) SubCutaneous at bedtime    OTHER:  docusate sodium 100 milliGRAM(s) Oral three times a day  influenza   Vaccine 0.5 milliLiter(s) IntraMuscular once  metoprolol tartrate 12.5 milliGRAM(s) Oral two times a day  senna 2 Tablet(s) Oral at bedtime    IVF:    CULTURES:  Culture Results:   Growth in anaerobic bottle: Corynebacterium species (10-31 @ 18:08)  Culture Results:   No growth at 3 days. (10-31 @ 18:08)    RADIOLOGY & ADDITIONAL TESTS:      ASSESSMENT:  77y Female s/p fall, CT head finding of brain mass, Left humerus fracture on x ray       PROXIMAL HUMERUS FRACTURINTRACRANIAL MASS  Handoff  MEWS Score  Depression  Brain mass  Sleep apnea  Spinal stenosis  HTN (hypertension)  Brain mass  Intracranial mass  Preoperative clearance  Left ventricular hypertrophy  HTN (hypertension)  Sleep apnea  Proximal humerus fracture  Brain mass  History of benign breast tumor  History of cervical discectomy  History of cosmetic surgery  H/O gastric bypass  FALL  90+  Proximal humerus fracture  Weakness      PLAN:  -neuro checks  -pain control prn  -keppra 500mg bid  -regular diet  -bowel regimen  -GI/DVT ppx  -OOB/PT/OT  -dispo- pending subacute rehab   -d/w Dr. Carrera

## 2018-11-05 LAB
ANION GAP SERPL CALC-SCNC: 12 MMOL/L — SIGNIFICANT CHANGE UP (ref 5–17)
BUN SERPL-MCNC: 9 MG/DL — SIGNIFICANT CHANGE UP (ref 7–23)
CALCIUM SERPL-MCNC: 9.4 MG/DL — SIGNIFICANT CHANGE UP (ref 8.4–10.5)
CHLORIDE SERPL-SCNC: 99 MMOL/L — SIGNIFICANT CHANGE UP (ref 96–108)
CO2 SERPL-SCNC: 30 MMOL/L — SIGNIFICANT CHANGE UP (ref 22–31)
CREAT SERPL-MCNC: 0.58 MG/DL — SIGNIFICANT CHANGE UP (ref 0.5–1.3)
CULTURE RESULTS: SIGNIFICANT CHANGE UP
GLUCOSE SERPL-MCNC: 103 MG/DL — HIGH (ref 70–99)
HCT VFR BLD CALC: 37.8 % — SIGNIFICANT CHANGE UP (ref 34.5–45)
HGB BLD-MCNC: 12.5 G/DL — SIGNIFICANT CHANGE UP (ref 11.5–15.5)
MAGNESIUM SERPL-MCNC: 2 MG/DL — SIGNIFICANT CHANGE UP (ref 1.6–2.6)
MCHC RBC-ENTMCNC: 30.3 PG — SIGNIFICANT CHANGE UP (ref 27–34)
MCHC RBC-ENTMCNC: 33.1 G/DL — SIGNIFICANT CHANGE UP (ref 32–36)
MCV RBC AUTO: 91.5 FL — SIGNIFICANT CHANGE UP (ref 80–100)
PHOSPHATE SERPL-MCNC: 3.8 MG/DL — SIGNIFICANT CHANGE UP (ref 2.5–4.5)
PLATELET # BLD AUTO: 239 K/UL — SIGNIFICANT CHANGE UP (ref 150–400)
POTASSIUM SERPL-MCNC: 4 MMOL/L — SIGNIFICANT CHANGE UP (ref 3.5–5.3)
POTASSIUM SERPL-SCNC: 4 MMOL/L — SIGNIFICANT CHANGE UP (ref 3.5–5.3)
RBC # BLD: 4.13 M/UL — SIGNIFICANT CHANGE UP (ref 3.8–5.2)
RBC # FLD: 12.6 % — SIGNIFICANT CHANGE UP (ref 10.3–16.9)
SODIUM SERPL-SCNC: 141 MMOL/L — SIGNIFICANT CHANGE UP (ref 135–145)
SPECIMEN SOURCE: SIGNIFICANT CHANGE UP
WBC # BLD: 5.4 K/UL — SIGNIFICANT CHANGE UP (ref 3.8–10.5)
WBC # FLD AUTO: 5.4 K/UL — SIGNIFICANT CHANGE UP (ref 3.8–10.5)

## 2018-11-05 RX ADMIN — LEVETIRACETAM 500 MILLIGRAM(S): 250 TABLET, FILM COATED ORAL at 05:19

## 2018-11-05 RX ADMIN — LEVETIRACETAM 500 MILLIGRAM(S): 250 TABLET, FILM COATED ORAL at 17:55

## 2018-11-05 RX ADMIN — Medication 100 MILLIGRAM(S): at 05:19

## 2018-11-05 RX ADMIN — Medication 12.5 MILLIGRAM(S): at 05:19

## 2018-11-05 RX ADMIN — ENOXAPARIN SODIUM 40 MILLIGRAM(S): 100 INJECTION SUBCUTANEOUS at 22:24

## 2018-11-05 RX ADMIN — Medication 12.5 MILLIGRAM(S): at 17:54

## 2018-11-05 RX ADMIN — BUPROPION HYDROCHLORIDE 300 MILLIGRAM(S): 150 TABLET, EXTENDED RELEASE ORAL at 11:52

## 2018-11-05 NOTE — PROGRESS NOTE ADULT - SUBJECTIVE AND OBJECTIVE BOX
Neurology Follow up note    Name  PETE SNELL    HPI:  76yo Right handed female s/p fall X2 mechanical on 1028  Denies lose of consciousness, headaches, nausea,vomiting incontinent of stool or bladder    C/O LUE pain Xray revealed humerus fracture  Head CT revealed < from: CT Head No Cont (10.31.18 @ 12:05) >  Impression: No acute intracranial injury. 4.6 cm mildly hyperdense   bilateral parafalcine extra-axial mass along the anterior   interhemispheric fissure.     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)      Interval History - no change in headaches dizziness or weakness        REVIEW OF SYSTEMS    Vital Signs Last 24 Hrs  T(C): 36.8 (05 Nov 2018 05:38), Max: 37.1 (04 Nov 2018 16:01)  T(F): 98.2 (05 Nov 2018 05:38), Max: 98.7 (04 Nov 2018 16:01)  HR: 75 (05 Nov 2018 05:38) (73 - 78)  BP: 135/69 (05 Nov 2018 05:38) (135/69 - 143/84)  BP(mean): --  RR: 16 (05 Nov 2018 05:38) (16 - 17)  SpO2: 99% (05 Nov 2018 05:38) (95% - 99%)    Physical Exam-     Mental Status- awake and alert    Cranial Nerves- full EOM    Gait and station- unsteady    Motor- no foot drop    Reflexes- decreased    Sensation- no sensory level    Coordination- no tremors    Vascular - no bruits    Medications  acetaminophen   Tablet .. 650 milliGRAM(s) Oral every 6 hours PRN  buPROPion XL . 300 milliGRAM(s) Oral daily  docusate sodium 100 milliGRAM(s) Oral three times a day  enoxaparin Injectable 40 milliGRAM(s) SubCutaneous at bedtime  influenza   Vaccine 0.5 milliLiter(s) IntraMuscular once  levETIRAcetam 500 milliGRAM(s) Oral two times a day  metoprolol tartrate 12.5 milliGRAM(s) Oral two times a day  ondansetron Injectable 4 milliGRAM(s) IV Push every 6 hours PRN  oxyCODONE    5 mG/acetaminophen 325 mG 1 Tablet(s) Oral every 6 hours PRN  oxyCODONE    5 mG/acetaminophen 325 mG 2 Tablet(s) Oral every 6 hours PRN  senna 2 Tablet(s) Oral at bedtime      Lab      Radiology    Assessment- Meningioma - head trauma     Plan as per NS

## 2018-11-05 NOTE — PROGRESS NOTE ADULT - SUBJECTIVE AND OBJECTIVE BOX
HPI:  76yo Right handed female s/p fall X2 mechanical on 1028  Denies lose of consciousness, headaches, nausea,vomiting incontinent of stool or bladder    C/O LUE pain Xray revealed humerus fracture  Head CT revealed < from: CT Head No Cont (10.31.18 @ 12:05) >  Impression: No acute intracranial injury. 4.6 cm mildly hyperdense   bilateral parafalcine extra-axial mass along the anterior   interhemispheric fissure.     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)    OVERNIGHT EVENTS:  Vital Signs Last 24 Hrs  T(C): 36.7 (05 Nov 2018 08:16), Max: 37.1 (04 Nov 2018 16:01)  T(F): 98 (05 Nov 2018 08:16), Max: 98.7 (04 Nov 2018 16:01)  HR: 80 (05 Nov 2018 08:16) (73 - 80)  BP: 120/67 (05 Nov 2018 08:16) (120/67 - 143/84)  BP(mean): --  RR: 15 (05 Nov 2018 08:16) (15 - 17)  SpO2: 99% (05 Nov 2018 08:16) (95% - 99%)    I&O's Summary    05 Nov 2018 07:01  -  05 Nov 2018 10:51  --------------------------------------------------------  IN: 480 mL / OUT: 400 mL / NET: 80 mL        PHYSICAL EXAM:  Neurological:    A&OX3 Cranial nerve sintact  SHARMA    Cardiovascular: RRR  Respiratory: Lungs CTAB  Gastrointestinal: +BS  Genitourinary: Voiding without difficulty  Extremities: warm and dry      DIET: Regular    LABS:                        12.5   5.4   )-----------( 239      ( 05 Nov 2018 06:53 )             37.8     11-05    141  |  99  |  9   ----------------------------<  103<H>  4.0   |  30  |  0.58    Ca    9.4      05 Nov 2018 06:53  Phos  3.8     11-05  Mg     2.0     11-05        CAPILLARY BLOOD GLUCOSE      Drug Levels: [] N/A    CSF Analysis: [] N/A      Allergies    No Known Allergies    Intolerances      MEDICATIONS:  Antibiotics:    Neuro:  acetaminophen   Tablet .. 650 milliGRAM(s) Oral every 6 hours PRN  buPROPion XL . 300 milliGRAM(s) Oral daily  levETIRAcetam 500 milliGRAM(s) Oral two times a day  ondansetron Injectable 4 milliGRAM(s) IV Push every 6 hours PRN  oxyCODONE    5 mG/acetaminophen 325 mG 1 Tablet(s) Oral every 6 hours PRN  oxyCODONE    5 mG/acetaminophen 325 mG 2 Tablet(s) Oral every 6 hours PRN    Anticoagulation:  enoxaparin Injectable 40 milliGRAM(s) SubCutaneous at bedtime    OTHER:  docusate sodium 100 milliGRAM(s) Oral three times a day  influenza   Vaccine 0.5 milliLiter(s) IntraMuscular once  metoprolol tartrate 12.5 milliGRAM(s) Oral two times a day  senna 2 Tablet(s) Oral at bedtime    IVF:    CULTURES:  Culture Results:   Growth in anaerobic bottle: Corynebacterium species (10-31 @ 18:08)  Culture Results:   No growth at 4 days. (10-31 @ 18:08)    RADIOLOGY & ADDITIONAL TESTS:      ASSESSMENT:    77y Female s/p fall, CT head finding of brain mass, Left humerus fracture on x ray     PLAN:  NEURO:    Monitor neuro status  OT/PT  Continue current medical regime    Dispo: Discussed with attending

## 2018-11-06 PROCEDURE — 99232 SBSQ HOSP IP/OBS MODERATE 35: CPT

## 2018-11-06 RX ADMIN — BUPROPION HYDROCHLORIDE 300 MILLIGRAM(S): 150 TABLET, EXTENDED RELEASE ORAL at 11:58

## 2018-11-06 RX ADMIN — Medication 650 MILLIGRAM(S): at 04:33

## 2018-11-06 RX ADMIN — Medication 650 MILLIGRAM(S): at 05:33

## 2018-11-06 RX ADMIN — ENOXAPARIN SODIUM 40 MILLIGRAM(S): 100 INJECTION SUBCUTANEOUS at 22:04

## 2018-11-06 RX ADMIN — Medication 12.5 MILLIGRAM(S): at 17:57

## 2018-11-06 RX ADMIN — Medication 12.5 MILLIGRAM(S): at 05:52

## 2018-11-06 RX ADMIN — LEVETIRACETAM 500 MILLIGRAM(S): 250 TABLET, FILM COATED ORAL at 17:59

## 2018-11-06 RX ADMIN — LEVETIRACETAM 500 MILLIGRAM(S): 250 TABLET, FILM COATED ORAL at 05:53

## 2018-11-06 NOTE — PROGRESS NOTE ADULT - SUBJECTIVE AND OBJECTIVE BOX
Neurology Follow up note    Name  PETE SNELL    HPI:  78yo Right handed female s/p fall X2 mechanical on 1028  Denies lose of consciousness, headaches, nausea,vomiting incontinent of stool or bladder    C/O LUE pain Xray revealed humerus fracture  Head CT revealed < from: CT Head No Cont (10.31.18 @ 12:05) >  Impression: No acute intracranial injury. 4.6 cm mildly hyperdense   bilateral parafalcine extra-axial mass along the anterior      Mild headaches - discomfort in extremities after the fall that led to hospitaluzation  interhemispheric fissure.     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)      Interval History -        REVIEW OF SYSTEMS    Vital Signs Last 24 Hrs  T(C): 36.5 (06 Nov 2018 08:15), Max: 37 (05 Nov 2018 16:16)  T(F): 97.7 (06 Nov 2018 08:15), Max: 98.6 (05 Nov 2018 16:16)  HR: 79 (06 Nov 2018 08:15) (68 - 79)  BP: 98/65 (06 Nov 2018 08:15) (98/65 - 148/79)  BP(mean): --  RR: 15 (06 Nov 2018 08:15) (15 - 17)  SpO2: 95% (06 Nov 2018 08:15) (95% - 96%)    Physical Exam-     Mental Status- awake and alert     Cranial Nerves- full EOM    Gait and station- n/a    Motor-moves all 4 extremities    Reflexes- decreased    Sensation- no sensory level    Coordination- no tremors    Vascular - no bruits    Medications  acetaminophen   Tablet .. 650 milliGRAM(s) Oral every 6 hours PRN  buPROPion XL . 300 milliGRAM(s) Oral daily  docusate sodium 100 milliGRAM(s) Oral three times a day  enoxaparin Injectable 40 milliGRAM(s) SubCutaneous at bedtime  influenza   Vaccine 0.5 milliLiter(s) IntraMuscular once  levETIRAcetam 500 milliGRAM(s) Oral two times a day  metoprolol tartrate 12.5 milliGRAM(s) Oral two times a day  ondansetron Injectable 4 milliGRAM(s) IV Push every 6 hours PRN  oxyCODONE    5 mG/acetaminophen 325 mG 1 Tablet(s) Oral every 6 hours PRN  oxyCODONE    5 mG/acetaminophen 325 mG 2 Tablet(s) Oral every 6 hours PRN  senna 2 Tablet(s) Oral at bedtime      Lab      Radiology    Assessment- Meningioma    Plan a per neurosurgery

## 2018-11-06 NOTE — PROGRESS NOTE ADULT - SUBJECTIVE AND OBJECTIVE BOX
HPI:  76yo Right handed female s/p fall X2 mechanical on 1028  Denies lose of consciousness, headaches, nausea,vomiting incontinent of stool or bladder    C/O LUE pain Xray revealed humerus fracture  Head CT revealed < from: CT Head No Cont (10.31.18 @ 12:05) >  Impression: No acute intracranial injury. 4.6 cm mildly hyperdense   bilateral parafalcine extra-axial mass along the anterior   interhemispheric fissure.     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)    OVERNIGHT EVENTS:  Vital Signs Last 24 Hrs  T(C): 36.8 (06 Nov 2018 05:19), Max: 37 (05 Nov 2018 16:16)  T(F): 98.2 (06 Nov 2018 05:19), Max: 98.6 (05 Nov 2018 16:16)  HR: 68 (06 Nov 2018 05:19) (68 - 80)  BP: 148/79 (06 Nov 2018 05:19) (120/67 - 148/79)  BP(mean): --  RR: 17 (06 Nov 2018 05:19) (15 - 17)  SpO2: 96% (06 Nov 2018 05:19) (95% - 99%)    I&O's Summary    05 Nov 2018 07:01  -  06 Nov 2018 07:00  --------------------------------------------------------  IN: 1320 mL / OUT: 1650 mL / NET: -330 mL      PHYSICAL EXAM:  Neurological:    A&OX3 Cranial nerves intact  SHARMA  Motor exam:           Cardiovascular:RRR  Respiratory: Lungs CTAB  Gastrointestinal: +BS  Genitourinary: Voiding without difficulty  Extremities: warm and dry      DIET: Regular    LABS:                        12.5   5.4   )-----------( 239      ( 05 Nov 2018 06:53 )             37.8     11-05    141  |  99  |  9   ----------------------------<  103<H>  4.0   |  30  |  0.58    Ca    9.4      05 Nov 2018 06:53  Phos  3.8     11-05  Mg     2.0     11-05              CAPILLARY BLOOD GLUCOSE      Drug Levels: [] N/A    CSF Analysis: [] N/A      Allergies    No Known Allergies    Intolerances      MEDICATIONS:  Antibiotics:    Neuro:  acetaminophen   Tablet .. 650 milliGRAM(s) Oral every 6 hours PRN  buPROPion XL . 300 milliGRAM(s) Oral daily  levETIRAcetam 500 milliGRAM(s) Oral two times a day  ondansetron Injectable 4 milliGRAM(s) IV Push every 6 hours PRN  oxyCODONE    5 mG/acetaminophen 325 mG 1 Tablet(s) Oral every 6 hours PRN  oxyCODONE    5 mG/acetaminophen 325 mG 2 Tablet(s) Oral every 6 hours PRN    Anticoagulation:  enoxaparin Injectable 40 milliGRAM(s) SubCutaneous at bedtime    OTHER:  docusate sodium 100 milliGRAM(s) Oral three times a day  influenza   Vaccine 0.5 milliLiter(s) IntraMuscular once  metoprolol tartrate 12.5 milliGRAM(s) Oral two times a day  senna 2 Tablet(s) Oral at bedtime    IVF:    CULTURES:  Culture Results:   Growth in anaerobic bottle: Corynebacterium species (10-31 @ 18:08)  Culture Results:   No growth at 5 days. (10-31 @ 18:08)    RADIOLOGY & ADDITIONAL TESTS:      ASSESSMENT:  77y Female s/p fall  Brain mass  LUE humerus dislocation  sling no surgery required    PLAN:    NEURO:    Monitor neuro status  OT/PT  Continue current medical regime  F/U ortho once out of rehab  Pain management  Bowel regime    Dispo: Discussed with attending

## 2018-11-06 NOTE — DIETITIAN INITIAL EVALUATION ADULT. - OTHER INFO
78yo F s/p fall, CT head finding of brain mass, Left humerus fracture on x ray. Pt medically cleared for d/c, pending insurance auth. Pt seen resting in bed. Currently on a regular diet and tolerating PO. Endorsing good appetite, consuming >75% of meals. Stated 5'4.5". Denies N/V. Pain controlled. NKFA or dietary restrictins. Skin and GI WDL.

## 2018-11-06 NOTE — DIETITIAN INITIAL EVALUATION ADULT. - ENERGY NEEDS
Height: 5'4.5" Weight: 190lbs, IBW 130lbs+/-10%, %%, BMI 31.6  IBW used for calculations as pt >120% of IBW   Nutrient needs based on [facility] standards of care for maintenance in older adults.

## 2018-11-07 VITALS
SYSTOLIC BLOOD PRESSURE: 134 MMHG | RESPIRATION RATE: 16 BRPM | OXYGEN SATURATION: 97 % | HEART RATE: 81 BPM | DIASTOLIC BLOOD PRESSURE: 77 MMHG | TEMPERATURE: 99 F

## 2018-11-07 LAB
CULTURE RESULTS: SIGNIFICANT CHANGE UP
ORGANISM # SPEC MICROSCOPIC CNT: SIGNIFICANT CHANGE UP
SPECIMEN SOURCE: SIGNIFICANT CHANGE UP

## 2018-11-07 PROCEDURE — 73030 X-RAY EXAM OF SHOULDER: CPT

## 2018-11-07 PROCEDURE — 82550 ASSAY OF CK (CPK): CPT

## 2018-11-07 PROCEDURE — A9585: CPT

## 2018-11-07 PROCEDURE — 80053 COMPREHEN METABOLIC PANEL: CPT

## 2018-11-07 PROCEDURE — 93306 TTE W/DOPPLER COMPLETE: CPT

## 2018-11-07 PROCEDURE — 70496 CT ANGIOGRAPHY HEAD: CPT

## 2018-11-07 PROCEDURE — 70450 CT HEAD/BRAIN W/O DYE: CPT

## 2018-11-07 PROCEDURE — 99285 EMERGENCY DEPT VISIT HI MDM: CPT | Mod: 25

## 2018-11-07 PROCEDURE — 84484 ASSAY OF TROPONIN QUANT: CPT

## 2018-11-07 PROCEDURE — 36415 COLL VENOUS BLD VENIPUNCTURE: CPT

## 2018-11-07 PROCEDURE — 97530 THERAPEUTIC ACTIVITIES: CPT

## 2018-11-07 PROCEDURE — 93970 EXTREMITY STUDY: CPT

## 2018-11-07 PROCEDURE — 87040 BLOOD CULTURE FOR BACTERIA: CPT

## 2018-11-07 PROCEDURE — 99232 SBSQ HOSP IP/OBS MODERATE 35: CPT | Mod: GC

## 2018-11-07 PROCEDURE — 80048 BASIC METABOLIC PNL TOTAL CA: CPT

## 2018-11-07 PROCEDURE — 97116 GAIT TRAINING THERAPY: CPT

## 2018-11-07 PROCEDURE — 87086 URINE CULTURE/COLONY COUNT: CPT

## 2018-11-07 PROCEDURE — 97161 PT EVAL LOW COMPLEX 20 MIN: CPT

## 2018-11-07 PROCEDURE — 73060 X-RAY EXAM OF HUMERUS: CPT | Mod: 26,LT

## 2018-11-07 PROCEDURE — 96360 HYDRATION IV INFUSION INIT: CPT

## 2018-11-07 PROCEDURE — 83735 ASSAY OF MAGNESIUM: CPT

## 2018-11-07 PROCEDURE — 73560 X-RAY EXAM OF KNEE 1 OR 2: CPT | Mod: 26,LT

## 2018-11-07 PROCEDURE — 87150 DNA/RNA AMPLIFIED PROBE: CPT

## 2018-11-07 PROCEDURE — 71101 X-RAY EXAM UNILAT RIBS/CHEST: CPT

## 2018-11-07 PROCEDURE — 85610 PROTHROMBIN TIME: CPT

## 2018-11-07 PROCEDURE — 85730 THROMBOPLASTIN TIME PARTIAL: CPT

## 2018-11-07 PROCEDURE — 84100 ASSAY OF PHOSPHORUS: CPT

## 2018-11-07 PROCEDURE — 90686 IIV4 VACC NO PRSV 0.5 ML IM: CPT

## 2018-11-07 PROCEDURE — 85025 COMPLETE CBC W/AUTO DIFF WBC: CPT

## 2018-11-07 PROCEDURE — 73060 X-RAY EXAM OF HUMERUS: CPT

## 2018-11-07 PROCEDURE — 82553 CREATINE MB FRACTION: CPT

## 2018-11-07 PROCEDURE — 73560 X-RAY EXAM OF KNEE 1 OR 2: CPT

## 2018-11-07 PROCEDURE — 70553 MRI BRAIN STEM W/O & W/DYE: CPT

## 2018-11-07 PROCEDURE — 83605 ASSAY OF LACTIC ACID: CPT

## 2018-11-07 PROCEDURE — 81001 URINALYSIS AUTO W/SCOPE: CPT

## 2018-11-07 PROCEDURE — 93005 ELECTROCARDIOGRAM TRACING: CPT

## 2018-11-07 PROCEDURE — 83036 HEMOGLOBIN GLYCOSYLATED A1C: CPT

## 2018-11-07 PROCEDURE — 85027 COMPLETE CBC AUTOMATED: CPT

## 2018-11-07 RX ORDER — ENOXAPARIN SODIUM 100 MG/ML
40 INJECTION SUBCUTANEOUS
Qty: 0 | Refills: 0 | DISCHARGE
Start: 2018-11-07

## 2018-11-07 RX ORDER — BUPROPION HYDROCHLORIDE 150 MG/1
400 TABLET, EXTENDED RELEASE ORAL
Qty: 0 | Refills: 0 | COMMUNITY

## 2018-11-07 RX ORDER — ONDANSETRON 8 MG/1
4 TABLET, FILM COATED ORAL
Qty: 0 | Refills: 0 | DISCHARGE
Start: 2018-11-07

## 2018-11-07 RX ORDER — BUPROPION HYDROCHLORIDE 150 MG/1
1 TABLET, EXTENDED RELEASE ORAL
Qty: 0 | Refills: 0 | DISCHARGE
Start: 2018-11-07

## 2018-11-07 RX ORDER — ACETAMINOPHEN 500 MG
2 TABLET ORAL
Qty: 0 | Refills: 0 | DISCHARGE
Start: 2018-11-07

## 2018-11-07 RX ORDER — LEVETIRACETAM 250 MG/1
1 TABLET, FILM COATED ORAL
Qty: 0 | Refills: 0 | DISCHARGE
Start: 2018-11-07

## 2018-11-07 RX ORDER — METOPROLOL TARTRATE 50 MG
12.5 TABLET ORAL
Qty: 0 | Refills: 0 | DISCHARGE
Start: 2018-11-07

## 2018-11-07 RX ORDER — NEBIVOLOL HYDROCHLORIDE 5 MG/1
1 TABLET ORAL
Qty: 0 | Refills: 0 | COMMUNITY

## 2018-11-07 RX ADMIN — Medication 12.5 MILLIGRAM(S): at 17:55

## 2018-11-07 RX ADMIN — LEVETIRACETAM 500 MILLIGRAM(S): 250 TABLET, FILM COATED ORAL at 06:01

## 2018-11-07 RX ADMIN — Medication 12.5 MILLIGRAM(S): at 06:01

## 2018-11-07 RX ADMIN — BUPROPION HYDROCHLORIDE 300 MILLIGRAM(S): 150 TABLET, EXTENDED RELEASE ORAL at 11:26

## 2018-11-07 RX ADMIN — LEVETIRACETAM 500 MILLIGRAM(S): 250 TABLET, FILM COATED ORAL at 17:55

## 2018-11-07 RX ADMIN — INFLUENZA VIRUS VACCINE 0.5 MILLILITER(S): 15; 15; 15; 15 SUSPENSION INTRAMUSCULAR at 11:26

## 2018-11-07 NOTE — PROGRESS NOTE ADULT - SUBJECTIVE AND OBJECTIVE BOX
HPI:  78yo Right handed female s/p fall X2 mechanical on 1028  Denies lose of consciousness, headaches, nausea,vomiting incontinent of stool or bladder    C/O LUE pain Xray revealed humerus fracture  Head CT revealed < from: CT Head No Cont (10.31.18 @ 12:05) >  Impression: No acute intracranial injury. 4.6 cm mildly hyperdense   bilateral parafalcine extra-axial mass along the anterior   interhemispheric fissure.           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 Awaiting insurance authorization for [facility] placement. Patient is no complaint with Sling for left humerus head fracture.    Vital Signs Last 24 Hrs  T(C): 37.2 (07 Nov 2018 08:45), Max: 37.2 (07 Nov 2018 08:45)  T(F): 98.9 (07 Nov 2018 08:45), Max: 98.9 (07 Nov 2018 08:45)  HR: 75 (07 Nov 2018 08:45) (66 - 85)  BP: 112/74 (07 Nov 2018 08:45) (105/70 - 130/61)  BP(mean): --  RR: 16 (07 Nov 2018 08:45) (15 - 16)  SpO2: 96% (07 Nov 2018 08:45) (95% - 97%)    I&O's Summary    06 Nov 2018 07:01  -  07 Nov 2018 07:00  --------------------------------------------------------  IN: 1320 mL / OUT: 2150 mL / NET: -830 mL    07 Nov 2018 07:01  -  07 Nov 2018 09:58  --------------------------------------------------------  IN: 300 mL / OUT: 450 mL / NET: -150 mL        PHYSICAL EXAM:  Neurological: A&O x 3, PERRl  Lung: good air entry, clear lung sound  Heart: S1S2 regular  Abd: obese, soft, non tender  Ext: RUE: limited ROM secondary to humerus fracture.   No Other focal motor deficit  No sensory deficit to touch.  DIET: Regular  LABS:    CAPILLARY BLOOD GLUCOSE          Drug Levels: [] N/A    CSF Analysis: [] N/A      Allergies    No Known Allergies    Intolerances      MEDICATIONS:  Antibiotics:    Neuro:  acetaminophen   Tablet .. 650 milliGRAM(s) Oral every 6 hours PRN  buPROPion XL . 300 milliGRAM(s) Oral daily  levETIRAcetam 500 milliGRAM(s) Oral two times a day  ondansetron Injectable 4 milliGRAM(s) IV Push every 6 hours PRN  oxyCODONE    5 mG/acetaminophen 325 mG 1 Tablet(s) Oral every 6 hours PRN  oxyCODONE    5 mG/acetaminophen 325 mG 2 Tablet(s) Oral every 6 hours PRN    Anticoagulation:  enoxaparin Injectable 40 milliGRAM(s) SubCutaneous at bedtime    OTHER:  docusate sodium 100 milliGRAM(s) Oral three times a day  influenza   Vaccine 0.5 milliLiter(s) IntraMuscular once  metoprolol tartrate 12.5 milliGRAM(s) Oral two times a day  senna 2 Tablet(s) Oral at bedtime    IVF:    CULTURES:  Culture Results:   Growth in anaerobic bottle: Corynebacterium species (10-31 @ 18:08)  Culture Results:   No growth at 5 days. (10-31 @ 18:08)    RADIOLOGY & ADDITIONAL TESTS:      ASSESSMENT:  77y Female  with R. humerus fracture, bifrontal brain lesion awaiting rehab placement.    PLAN: F/U with Dr. Carrera as outpatient for possible future tumor resection.  f/U with Orthopedics  DVT PROPHYLAXIS:  []x Venodynes                                [xHeparin/Lovenox    DISPOSITION: [facility]

## 2018-11-07 NOTE — PROGRESS NOTE ADULT - SUBJECTIVE AND OBJECTIVE BOX
Interval Events: Reviewed  Patient seen and examined at bedside.    Patient is a 77y old  Female who presents with a chief complaint of s/p mechanical fall (07 Nov 2018 09:58)    She not decided about proceeding with surgery now. She will discuss the case and maybe go for a second opinion  PAST MEDICAL & SURGICAL HISTORY:  Depression  Brain mass  Sleep apnea: uses CPAP  Spinal stenosis  HTN (hypertension)  History of benign breast tumor  History of cervical discectomy: 2012  History of cosmetic surgery  H/O gastric bypass: 2002      MEDICATIONS:  Pulmonary:    Antimicrobials:    Anticoagulants:  enoxaparin Injectable 40 milliGRAM(s) SubCutaneous at bedtime    Cardiac:  metoprolol tartrate 12.5 milliGRAM(s) Oral two times a day      Allergies    No Known Allergies    Intolerances        Vital Signs Last 24 Hrs  T(C): 37.1 (07 Nov 2018 14:00), Max: 37.2 (07 Nov 2018 08:45)  T(F): 98.7 (07 Nov 2018 14:00), Max: 98.9 (07 Nov 2018 08:45)  HR: 81 (07 Nov 2018 14:00) (66 - 85)  BP: 134/77 (07 Nov 2018 14:00) (112/74 - 134/77)  BP(mean): --  RR: 16 (07 Nov 2018 14:00) (15 - 16)  SpO2: 97% (07 Nov 2018 14:00) (95% - 97%)    11-06 @ 07:01  -  11-07 @ 07:00  --------------------------------------------------------  IN: 1320 mL / OUT: 2150 mL / NET: -830 mL    11-07 @ 07:01  -  11-07 @ 22:29  --------------------------------------------------------  IN: 780 mL / OUT: 750 mL / NET: 30 mL          LABS:                                  RADIOLOGY & ADDITIONAL STUDIES (The following images were personally reviewed):  Roy:                                     No  Urine output:                       adequate  DVT prophylaxis:                 Yes  Flattus:                                  Yes  Bowel movement:              No

## 2018-11-07 NOTE — PROGRESS NOTE ADULT - PROVIDER SPECIALTY LIST ADULT
Internal Medicine
Internal Medicine
NSICU
Neurology
Neurosurgery
Orthopedics
Orthopedics
Neurosurgery
Neurosurgery

## 2018-11-07 NOTE — PROGRESS NOTE ADULT - PROBLEM SELECTOR PLAN 1
The patient is awaiting surgery. The patient is seeking a second opinion that might be discharge today

## 2018-11-07 NOTE — PROGRESS NOTE ADULT - ATTENDING COMMENTS
Patient doing okay. Still with unsteady gait otherwise non-focal. Should followup in office 2 weeks after discharge. Will likely need brain tumor resection. Reasonable to hold off for now as fractured humerus heals and patient sorts out some of her personal issues (lives along without help etc.). Continue PT/OT. DVT prophylaxis. Dispo plan for rehab, awaiting bed.     Herberth Carrera M.D.   Neurosurgery
Patient seen and examined with house-staff during bedside rounds.  Resident note read, including vitals, physical findings, laboratory data, and radiological reports.   Revisions included below.  Direct personal management at bed side and extensive interpretation of the data.  Plan was outlined and discussed in details with the housestaff.  Decision making of high complexity  Action taken for acute disease activity to reflect the level of care provided:  - medication reconciliation  - review laboratory data  I reviewed the CT scan of the head. I reviewed the chest x-ray. The official report stated that chest x-ray was unremarkable. My concern is the fondness in the right hilar area. Recommend CT scan of the chest
Patient seen and examined with house-staff during bedside rounds.  Resident note read, including vitals, physical findings, laboratory data, and radiological reports.   Revisions included below.  Direct personal management at bed side and extensive interpretation of the data.  Plan was outlined and discussed in details with the housestaff.  Decision making of high complexity  Action taken for acute disease activity to reflect the level of care provided:  - medication reconciliation  - review laboratory data  I reviewed the CT scan of the head. I reviewed the chest x-ray. The official report stated that chest x-ray was unremarkable. My concern is the fondness in the right hilar area. Recommend CT scan of the chest. I knew the patient the name of two internist to follow up with

## 2018-11-07 NOTE — PROGRESS NOTE ADULT - SUBJECTIVE AND OBJECTIVE BOX
Neurology Follow up note    Name  PETE SNELL    HPI:  76yo Right handed female s/p fall X2 mechanical on 1028  Denies lose of consciousness, headaches, nausea,vomiting incontinent of stool or bladder    C/O LUE pain Xray revealed humerus fracture  Head CT revealed < from: CT Head No Cont (10.31.18 @ 12:05) >  Impression: No acute intracranial injury. 4.6 cm mildly hyperdense   bilateral parafalcine extra-axial mass along the anterior   interhemispheric fissure.     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)      Interval History - difficulty using her left arm- mild headaches         REVIEW OF SYSTEMS    Vital Signs Last 24 Hrs  T(C): 37.2 (07 Nov 2018 08:45), Max: 37.2 (07 Nov 2018 08:45)  T(F): 98.9 (07 Nov 2018 08:45), Max: 98.9 (07 Nov 2018 08:45)  HR: 75 (07 Nov 2018 08:45) (66 - 85)  BP: 112/74 (07 Nov 2018 08:45) (105/70 - 130/61)  BP(mean): --  RR: 16 (07 Nov 2018 08:45) (15 - 16)  SpO2: 96% (07 Nov 2018 08:45) (95% - 97%)    Physical Exam-     Mental Status- awake and alert    Cranial Nerves- full EOM    Gait and station- n/a    Motor- LUE weakness    Reflexes- decreased    Sensation- no sensory level    Coordination- no tremors    Vascular - no bruits    Medications  acetaminophen   Tablet .. 650 milliGRAM(s) Oral every 6 hours PRN  buPROPion XL . 300 milliGRAM(s) Oral daily  docusate sodium 100 milliGRAM(s) Oral three times a day  enoxaparin Injectable 40 milliGRAM(s) SubCutaneous at bedtime  influenza   Vaccine 0.5 milliLiter(s) IntraMuscular once  levETIRAcetam 500 milliGRAM(s) Oral two times a day  metoprolol tartrate 12.5 milliGRAM(s) Oral two times a day  ondansetron Injectable 4 milliGRAM(s) IV Push every 6 hours PRN  oxyCODONE    5 mG/acetaminophen 325 mG 1 Tablet(s) Oral every 6 hours PRN  oxyCODONE    5 mG/acetaminophen 325 mG 2 Tablet(s) Oral every 6 hours PRN  senna 2 Tablet(s) Oral at bedtime      Lab      Radiology    Assessment-  Meningioma     Plan as per NS

## 2018-11-09 PROBLEM — G93.9 DISORDER OF BRAIN, UNSPECIFIED: Chronic | Status: ACTIVE | Noted: 2018-10-31

## 2018-11-09 PROBLEM — F32.9 MAJOR DEPRESSIVE DISORDER, SINGLE EPISODE, UNSPECIFIED: Chronic | Status: ACTIVE | Noted: 2018-10-31

## 2018-11-13 DIAGNOSIS — D32.0 BENIGN NEOPLASM OF CEREBRAL MENINGES: ICD-10-CM

## 2018-11-13 DIAGNOSIS — R29.6 REPEATED FALLS: ICD-10-CM

## 2018-11-13 DIAGNOSIS — G93.5 COMPRESSION OF BRAIN: ICD-10-CM

## 2018-11-13 DIAGNOSIS — E86.0 DEHYDRATION: ICD-10-CM

## 2018-11-13 DIAGNOSIS — F32.9 MAJOR DEPRESSIVE DISORDER, SINGLE EPISODE, UNSPECIFIED: ICD-10-CM
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
## 2018-11-13 DIAGNOSIS — S42.202A UNSPECIFIED FRACTURE OF UPPER END OF LEFT HUMERUS, INITIAL ENCOUNTER FOR CLOSED FRACTURE: ICD-10-CM
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
## 2018-11-13 DIAGNOSIS — G47.30 SLEEP APNEA, UNSPECIFIED: ICD-10-CM

## 2018-11-13 DIAGNOSIS — Z53.9 PROCEDURE AND TREATMENT NOT CARRIED OUT, UNSPECIFIED REASON: ICD-10-CM

## 2018-11-13 DIAGNOSIS — Z99.89 DEPENDENCE ON OTHER ENABLING MACHINES AND DEVICES: ICD-10-CM

## 2018-11-13 DIAGNOSIS — I10 ESSENTIAL (PRIMARY) HYPERTENSION: ICD-10-CM

## 2018-12-03 ENCOUNTER — APPOINTMENT (OUTPATIENT)
Dept: NEUROSURGERY | Facility: CLINIC | Age: 78
End: 2018-12-03
Payer: COMMERCIAL

## 2018-12-10 ENCOUNTER — APPOINTMENT (OUTPATIENT)
Dept: NEUROSURGERY | Facility: CLINIC | Age: 78
End: 2018-12-10
Payer: COMMERCIAL

## 2018-12-10 VITALS
HEART RATE: 62 BPM | TEMPERATURE: 98.9 F | HEIGHT: 64 IN | WEIGHT: 185 LBS | BODY MASS INDEX: 31.58 KG/M2 | OXYGEN SATURATION: 95 % | DIASTOLIC BLOOD PRESSURE: 81 MMHG | SYSTOLIC BLOOD PRESSURE: 169 MMHG | RESPIRATION RATE: 16 BRPM

## 2018-12-10 DIAGNOSIS — Z86.69 PERSONAL HISTORY OF OTHER DISEASES OF THE NERVOUS SYSTEM AND SENSE ORGANS: ICD-10-CM

## 2018-12-10 DIAGNOSIS — Z78.9 OTHER SPECIFIED HEALTH STATUS: ICD-10-CM

## 2018-12-10 DIAGNOSIS — Z86.79 PERSONAL HISTORY OF OTHER DISEASES OF THE CIRCULATORY SYSTEM: ICD-10-CM

## 2018-12-10 DIAGNOSIS — Z82.49 FAMILY HISTORY OF ISCHEMIC HEART DISEASE AND OTHER DISEASES OF THE CIRCULATORY SYSTEM: ICD-10-CM

## 2018-12-10 PROCEDURE — 99215 OFFICE O/P EST HI 40 MIN: CPT

## 2018-12-19 ENCOUNTER — APPOINTMENT (OUTPATIENT)
Dept: INTERNAL MEDICINE | Facility: CLINIC | Age: 78
End: 2018-12-19
Payer: COMMERCIAL

## 2018-12-19 VITALS
DIASTOLIC BLOOD PRESSURE: 84 MMHG | WEIGHT: 185 LBS | SYSTOLIC BLOOD PRESSURE: 147 MMHG | TEMPERATURE: 97.5 F | BODY MASS INDEX: 31.58 KG/M2 | OXYGEN SATURATION: 98 % | HEART RATE: 67 BPM | HEIGHT: 64 IN

## 2018-12-19 VITALS — SYSTOLIC BLOOD PRESSURE: 130 MMHG | DIASTOLIC BLOOD PRESSURE: 80 MMHG

## 2018-12-19 PROCEDURE — 99387 INIT PM E/M NEW PAT 65+ YRS: CPT

## 2018-12-19 NOTE — PHYSICAL EXAM

## 2018-12-19 NOTE — ASSESSMENT
[FreeTextEntry1] : Long discussion took place regarding the brain tumor and the timing of surgery; She has been seeking multiple opinions.  She will need the surgery considering the size; All labs to be done fasting; Otherwise without change in medication

## 2018-12-19 NOTE — HISTORY OF PRESENT ILLNESS
[FreeTextEntry1] : Follow up on multiple medical problems; Meningioma very large;  Found while in hospital after fall [de-identified] : As above needs to have surgery for this tumor;  Has knee arthritis;  Has knee buckling;  Still working; Professor at [employer].\par Got flu vaccine while in hospital;\par Has history of gastric bypass and has been iron and B12 deficiency \par ;  Follow ed by Dr Jerez;

## 2018-12-19 NOTE — HEALTH RISK ASSESSMENT
[No falls in past year] : Patient reported no falls in the past year [Patient declined PAP Smear] : Patient declined PAP Smear [Patient declined bone density test] : Patient declined bone density test [Patient reported colonoscopy was normal] : Patient reported colonoscopy was normal [HIV Test offered] : HIV Test offered [Hepatitis C test offered] : Hepatitis C test offered [] : No [MammogramDate] : 1/2017 [ColonoscopyDate] : 1/02016

## 2019-05-24 ENCOUNTER — APPOINTMENT (OUTPATIENT)
Dept: INTERNAL MEDICINE | Facility: CLINIC | Age: 79
End: 2019-05-24
Payer: COMMERCIAL

## 2019-05-24 VITALS
TEMPERATURE: 98.7 F | WEIGHT: 189 LBS | BODY MASS INDEX: 32.27 KG/M2 | SYSTOLIC BLOOD PRESSURE: 136 MMHG | DIASTOLIC BLOOD PRESSURE: 86 MMHG | HEART RATE: 75 BPM | HEIGHT: 64 IN | OXYGEN SATURATION: 94 %

## 2019-05-24 DIAGNOSIS — J40 BRONCHITIS, NOT SPECIFIED AS ACUTE OR CHRONIC: ICD-10-CM

## 2019-05-24 DIAGNOSIS — Z86.69 PERSONAL HISTORY OF OTHER DISEASES OF THE NERVOUS SYSTEM AND SENSE ORGANS: ICD-10-CM

## 2019-05-24 PROCEDURE — 36415 COLL VENOUS BLD VENIPUNCTURE: CPT

## 2019-05-24 PROCEDURE — 99213 OFFICE O/P EST LOW 20 MIN: CPT | Mod: 25

## 2019-05-24 RX ORDER — BUPROPION HYDROCHLORIDE 100 MG/1
TABLET, FILM COATED ORAL
Refills: 0 | Status: DISCONTINUED | COMMUNITY
End: 2019-05-24

## 2019-05-24 NOTE — HISTORY OF PRESENT ILLNESS
[FreeTextEntry1] : Patient will have removal of Meningoma at [place] on June 12; Here today because of bronchitis;  Also coughing [de-identified] : In addition to above some fever; No shortness of breath Stopped smoking 1976.

## 2019-05-24 NOTE — HEALTH RISK ASSESSMENT
[No falls in past year] : Patient reported no falls in the past year [0] : 1) Little interest or pleasure doing things: Not at all (0) [] : No [[id number]] : 0

## 2019-05-24 NOTE — ASSESSMENT
[FreeTextEntry1] : Patient will having neurosurgery at [place];  Presently with evidence of bronchitis as well as conjunctivitis; Have ordered antibiotics and also eye drops; She will call me with her status

## 2019-05-24 NOTE — PHYSICAL EXAM
[No Acute Distress] : no acute distress [Well-Appearing] : well-appearing [Well Developed] : well developed [Well Nourished] : well nourished [PERRL] : pupils equal round and reactive to light [Normal Sclera/Conjunctiva] : normal sclera/conjunctiva [Fundoscopic Exam Performed] : fundoscopic ~T exam ~C was performed [EOMI] : extraocular movements intact [Supple] : supple [No JVD] : no jugular venous distention [No Lymphadenopathy] : no lymphadenopathy [Normal Rate] : normal rate  [No Respiratory Distress] : no respiratory distress  [Soft] : abdomen soft [Non Tender] : non-tender [Normal S1, S2] : normal S1 and S2 [Non-distended] : non-distended [No Joint Swelling] : no joint swelling [de-identified] : conjuntivitis [Grossly Normal Strength/Tone] : grossly normal strength/tone [de-identified] : Wheezes

## 2019-06-03 LAB
25(OH)D3 SERPL-MCNC: 20.5 NG/ML
ALBUMIN SERPL ELPH-MCNC: 3.7 G/DL
ALP BLD-CCNC: 97 U/L
ALT SERPL-CCNC: 12 U/L
ANION GAP SERPL CALC-SCNC: 13 MMOL/L
APPEARANCE: ABNORMAL
AST SERPL-CCNC: 12 U/L
BACTERIA: ABNORMAL
BASOPHILS # BLD AUTO: 0.04 K/UL
BASOPHILS NFR BLD AUTO: 0.5 %
BILIRUB SERPL-MCNC: 0.2 MG/DL
BILIRUBIN URINE: NEGATIVE
BLOOD URINE: NORMAL
BUN SERPL-MCNC: 18 MG/DL
CALCIUM SERPL-MCNC: 9.1 MG/DL
CHLORIDE SERPL-SCNC: 101 MMOL/L
CHOLEST SERPL-MCNC: 134 MG/DL
CHOLEST/HDLC SERPL: 1.7 RATIO
CO2 SERPL-SCNC: 27 MMOL/L
COLOR: YELLOW
CREAT SERPL-MCNC: 0.64 MG/DL
EOSINOPHIL # BLD AUTO: 0.2 K/UL
EOSINOPHIL NFR BLD AUTO: 2.6 %
ESTIMATED AVERAGE GLUCOSE: 126 MG/DL
GLUCOSE QUALITATIVE U: NEGATIVE
GLUCOSE SERPL-MCNC: 97 MG/DL
HBA1C MFR BLD HPLC: 6 %
HCT VFR BLD CALC: 40.4 %
HDLC SERPL-MCNC: 77 MG/DL
HGB BLD-MCNC: 12.5 G/DL
HYALINE CASTS: 0 /LPF
IMM GRANULOCYTES NFR BLD AUTO: 0.4 %
KETONES URINE: NEGATIVE
LDLC SERPL CALC-MCNC: 46 MG/DL
LEUKOCYTE ESTERASE URINE: ABNORMAL
LYMPHOCYTES # BLD AUTO: 1.89 K/UL
LYMPHOCYTES NFR BLD AUTO: 24.3 %
MAN DIFF?: NORMAL
MCHC RBC-ENTMCNC: 29.3 PG
MCHC RBC-ENTMCNC: 30.9 GM/DL
MCV RBC AUTO: 94.8 FL
MICROSCOPIC-UA: NORMAL
MONOCYTES # BLD AUTO: 0.93 K/UL
MONOCYTES NFR BLD AUTO: 11.9 %
NEUTROPHILS # BLD AUTO: 4.7 K/UL
NEUTROPHILS NFR BLD AUTO: 60.3 %
NITRITE URINE: NEGATIVE
PH URINE: 6
PLATELET # BLD AUTO: 286 K/UL
POTASSIUM SERPL-SCNC: 4.4 MMOL/L
PROT SERPL-MCNC: 6.4 G/DL
PROTEIN URINE: NORMAL
RBC # BLD: 4.26 M/UL
RBC # FLD: 13.8 %
RED BLOOD CELLS URINE: 7 /HPF
SODIUM SERPL-SCNC: 141 MMOL/L
SPECIFIC GRAVITY URINE: 1.03
SQUAMOUS EPITHELIAL CELLS: 3 /HPF
T4 FREE SERPL-MCNC: 1.4 NG/DL
TRIGL SERPL-MCNC: 53 MG/DL
TSH SERPL-ACNC: 1.14 UIU/ML
UROBILINOGEN URINE: NORMAL
WBC # FLD AUTO: 7.79 K/UL
WHITE BLOOD CELLS URINE: 42 /HPF

## 2019-09-04 ENCOUNTER — APPOINTMENT (OUTPATIENT)
Dept: INTERNAL MEDICINE | Facility: CLINIC | Age: 79
End: 2019-09-04

## 2019-09-20 ENCOUNTER — APPOINTMENT (OUTPATIENT)
Dept: INTERNAL MEDICINE | Facility: CLINIC | Age: 79
End: 2019-09-20
Payer: COMMERCIAL

## 2019-09-20 VITALS — SYSTOLIC BLOOD PRESSURE: 130 MMHG | DIASTOLIC BLOOD PRESSURE: 80 MMHG

## 2019-09-20 VITALS
TEMPERATURE: 97 F | HEART RATE: 80 BPM | OXYGEN SATURATION: 94 % | BODY MASS INDEX: 32.44 KG/M2 | WEIGHT: 190 LBS | SYSTOLIC BLOOD PRESSURE: 163 MMHG | HEIGHT: 64 IN | DIASTOLIC BLOOD PRESSURE: 77 MMHG

## 2019-09-20 DIAGNOSIS — M17.10 UNILATERAL PRIMARY OSTEOARTHRITIS, UNSPECIFIED KNEE: ICD-10-CM

## 2019-09-20 DIAGNOSIS — M48.02 SPINAL STENOSIS, CERVICAL REGION: ICD-10-CM

## 2019-09-20 DIAGNOSIS — R21 RASH AND OTHER NONSPECIFIC SKIN ERUPTION: ICD-10-CM

## 2019-09-20 DIAGNOSIS — Z98.890 OTHER SPECIFIED POSTPROCEDURAL STATES: ICD-10-CM

## 2019-09-20 PROCEDURE — 90662 IIV NO PRSV INCREASED AG IM: CPT

## 2019-09-20 PROCEDURE — G0008: CPT

## 2019-09-20 PROCEDURE — 99213 OFFICE O/P EST LOW 20 MIN: CPT | Mod: 25

## 2019-09-20 NOTE — HISTORY OF PRESENT ILLNESS
[FreeTextEntry1] : She is 2 months post removal of meningoma at [place]; Long rehab stay;  [de-identified] : As above using a walker; Has knee problems; \par Not taking Wellbutrin; \par Back on Bystolic\par History of sleep apnea and has CPAP at home; Not using it much\par Was in Rehab for a prolonged amount of time;

## 2019-09-20 NOTE — HEALTH RISK ASSESSMENT
[No] : No [No falls in past year] : Patient reported no falls in the past year [0] : 2) Feeling down, depressed, or hopeless: Not at all (0) [] : No [[id number]] : 0

## 2019-09-20 NOTE — PHYSICAL EXAM
[No Acute Distress] : no acute distress [Well Nourished] : well nourished [Well Developed] : well developed [Normal Sclera/Conjunctiva] : normal sclera/conjunctiva [Well-Appearing] : well-appearing [PERRL] : pupils equal round and reactive to light [EOMI] : extraocular movements intact [Normal Oropharynx] : the oropharynx was normal [Normal Outer Ear/Nose] : the outer ears and nose were normal in appearance [No JVD] : no jugular venous distention [No Lymphadenopathy] : no lymphadenopathy [Thyroid Normal, No Nodules] : the thyroid was normal and there were no nodules present [Supple] : supple [No Accessory Muscle Use] : no accessory muscle use [No Respiratory Distress] : no respiratory distress  [Clear to Auscultation] : lungs were clear to auscultation bilaterally [Normal Rate] : normal rate  [Regular Rhythm] : with a regular rhythm [No Murmur] : no murmur heard [Normal S1, S2] : normal S1 and S2 [No Carotid Bruits] : no carotid bruits [No Abdominal Bruit] : a ~M bruit was not heard ~T in the abdomen [Pedal Pulses Present] : the pedal pulses are present [No Varicosities] : no varicosities [No Palpable Aorta] : no palpable aorta [No Edema] : there was no peripheral edema [No Extremity Clubbing/Cyanosis] : no extremity clubbing/cyanosis [Soft] : abdomen soft [Non-distended] : non-distended [Non Tender] : non-tender [No Masses] : no abdominal mass palpated [No HSM] : no HSM [Normal Bowel Sounds] : normal bowel sounds [Normal Posterior Cervical Nodes] : no posterior cervical lymphadenopathy [Normal Anterior Cervical Nodes] : no anterior cervical lymphadenopathy [No CVA Tenderness] : no CVA  tenderness [No Spinal Tenderness] : no spinal tenderness [Grossly Normal Strength/Tone] : grossly normal strength/tone [No Joint Swelling] : no joint swelling [No Rash] : no rash [Coordination Grossly Intact] : coordination grossly intact [No Focal Deficits] : no focal deficits [Normal Gait] : normal gait [Deep Tendon Reflexes (DTR)] : deep tendon reflexes were 2+ and symmetric [Normal Insight/Judgement] : insight and judgment were intact [Normal Affect] : the affect was normal

## 2019-09-20 NOTE — ASSESSMENT
[FreeTextEntry1] : Patient is stable post resection of meningoma;  She has problems getting around from the arthritis she has in her knees; Will continue her present BP medication since repeat BP was 130/85; Flu vaccine given. She will also get fitted fro another mask regarding her CPAP.

## 2019-10-23 ENCOUNTER — OTHER (OUTPATIENT)
Age: 79
End: 2019-10-23

## 2019-12-02 ENCOUNTER — APPOINTMENT (OUTPATIENT)
Dept: PULMONOLOGY | Facility: CLINIC | Age: 79
End: 2019-12-02

## 2020-03-04 ENCOUNTER — APPOINTMENT (OUTPATIENT)
Dept: PULMONOLOGY | Facility: CLINIC | Age: 80
End: 2020-03-04

## 2020-07-02 ENCOUNTER — APPOINTMENT (OUTPATIENT)
Dept: PULMONOLOGY | Facility: CLINIC | Age: 80
End: 2020-07-02
Payer: COMMERCIAL

## 2020-07-02 VITALS
HEIGHT: 64 IN | SYSTOLIC BLOOD PRESSURE: 114 MMHG | BODY MASS INDEX: 34.15 KG/M2 | DIASTOLIC BLOOD PRESSURE: 65 MMHG | TEMPERATURE: 97.9 F | OXYGEN SATURATION: 97 % | WEIGHT: 200 LBS | HEART RATE: 88 BPM

## 2020-07-02 DIAGNOSIS — Z86.69 PERSONAL HISTORY OF OTHER DISEASES OF THE NERVOUS SYSTEM AND SENSE ORGANS: ICD-10-CM

## 2020-07-02 PROCEDURE — 99204 OFFICE O/P NEW MOD 45 MIN: CPT

## 2020-07-02 RX ORDER — AMOXICILLIN 500 MG/1
500 CAPSULE ORAL
Qty: 21 | Refills: 0 | Status: DISCONTINUED | COMMUNITY
Start: 2019-04-24 | End: 2020-07-02

## 2020-07-06 PROBLEM — Z86.69 HISTORY OF CEREBRAL MENINGIOMA: Status: RESOLVED | Noted: 2020-07-06 | Resolved: 2020-07-06

## 2020-07-06 RX ORDER — ZOLPIDEM TARTRATE 5 MG/1
5 TABLET ORAL
Qty: 10 | Refills: 0 | Status: COMPLETED | COMMUNITY
Start: 2019-11-01 | End: 2020-07-06

## 2020-07-06 NOTE — ASSESSMENT
[FreeTextEntry1] : obstructive sleep apnea, severe prior to weight loss, needs to be re-evaluated. \par \par The patient is advised that in addition to worsening sleep leading to daytime sleepiness, sleep apnea may be associated with worsening hypertension and may be a risk factor for cardiovascular disease and stroke.\par \par Treatment options for sleep disordered breathing were discussed.  The most rapid and successful treatment remains nasal CPAP or BilevelPAP.  Alternatives include upper airway surgery such as uvulopharyngoplasty or a dental appliance (better for milder cases).  Recently hypoglossal nerve stimulation has been used.  Positional therapy (avoidance of supine posture) can be helpful, and all patients should try to maintain a healthy weight and avoid alcohol or other sedating medications close to bedtime\par \par unattended home sleep testing ordered, Unable to have overnight polysomnography  now because of Covid-19 outbreak.

## 2020-07-06 NOTE — HISTORY OF PRESENT ILLNESS
[FreeTextEntry1] : 79-year-old female seen for evaluation of sleep apnea. She tells me that 15 or 20 years ago she had a sleep study and was told she had severe sleep apnea, she believes she had an apnea hypopnea index of 72. She had bariatric surgery and lost more than 100 pounds, gained back some of this but is still at least 50 pounds below her weight at which she had her sleep study probably. She goes to bed at 1:30 AM, sleep latency 5 minutes, 3 awakenings, wakes up at 8 AM. She naps every day for 30-60 minutes. She was told of severe snoring and has been told she stops breathing during sleep. She denies morning headache, parasomnia, sleep paralysis, cataplexy, nasal or sinus congestion. She reports mild daytime sleepiness, [place] score of 7/24, but does nap every day.

## 2020-07-15 ENCOUNTER — APPOINTMENT (OUTPATIENT)
Dept: OPHTHALMOLOGY | Facility: CLINIC | Age: 80
End: 2020-07-15
Payer: COMMERCIAL

## 2020-07-15 ENCOUNTER — NON-APPOINTMENT (OUTPATIENT)
Age: 80
End: 2020-07-15

## 2020-07-15 PROCEDURE — 92025 CPTRIZED CORNEAL TOPOGRAPHY: CPT

## 2020-07-15 PROCEDURE — 92002 INTRM OPH EXAM NEW PATIENT: CPT

## 2020-07-15 PROCEDURE — 92250 FUNDUS PHOTOGRAPHY W/I&R: CPT

## 2020-07-15 PROCEDURE — 92286 ANT SGM IMG I&R SPECLR MIC: CPT

## 2020-07-31 ENCOUNTER — APPOINTMENT (OUTPATIENT)
Dept: SLEEP CENTER | Facility: HOME HEALTH | Age: 80
End: 2020-07-31
Payer: MEDICARE

## 2020-07-31 ENCOUNTER — OUTPATIENT (OUTPATIENT)
Dept: OUTPATIENT SERVICES | Facility: HOSPITAL | Age: 80
LOS: 1 days | End: 2020-07-31
Payer: COMMERCIAL

## 2020-07-31 DIAGNOSIS — Z98.890 OTHER SPECIFIED POSTPROCEDURAL STATES: Chronic | ICD-10-CM

## 2020-07-31 DIAGNOSIS — Z86.018 PERSONAL HISTORY OF OTHER BENIGN NEOPLASM: Chronic | ICD-10-CM

## 2020-07-31 PROCEDURE — 95800 SLP STDY UNATTENDED: CPT

## 2020-07-31 PROCEDURE — 95800 SLP STDY UNATTENDED: CPT | Mod: 26

## 2020-08-03 DIAGNOSIS — G47.33 OBSTRUCTIVE SLEEP APNEA (ADULT) (PEDIATRIC): ICD-10-CM

## 2020-08-05 DIAGNOSIS — R10.811 RIGHT UPPER QUADRANT ABDOMINAL TENDERNESS: ICD-10-CM

## 2020-08-09 ENCOUNTER — NON-APPOINTMENT (OUTPATIENT)
Age: 80
End: 2020-08-09

## 2020-08-10 ENCOUNTER — FORM ENCOUNTER (OUTPATIENT)
Age: 80
End: 2020-08-10

## 2020-08-18 ENCOUNTER — OUTPATIENT (OUTPATIENT)
Dept: OUTPATIENT SERVICES | Facility: HOSPITAL | Age: 80
LOS: 1 days | End: 2020-08-18
Payer: MEDICARE

## 2020-08-18 ENCOUNTER — APPOINTMENT (OUTPATIENT)
Dept: ULTRASOUND IMAGING | Facility: HOSPITAL | Age: 80
End: 2020-08-18
Payer: MEDICARE

## 2020-08-18 DIAGNOSIS — Z98.890 OTHER SPECIFIED POSTPROCEDURAL STATES: Chronic | ICD-10-CM

## 2020-08-18 DIAGNOSIS — Z86.018 PERSONAL HISTORY OF OTHER BENIGN NEOPLASM: Chronic | ICD-10-CM

## 2020-08-18 PROCEDURE — 76700 US EXAM ABDOM COMPLETE: CPT | Mod: 26

## 2020-08-18 PROCEDURE — 76700 US EXAM ABDOM COMPLETE: CPT

## 2020-09-02 ENCOUNTER — APPOINTMENT (OUTPATIENT)
Dept: PULMONOLOGY | Facility: CLINIC | Age: 80
End: 2020-09-02
Payer: COMMERCIAL

## 2020-09-02 VITALS
WEIGHT: 185 LBS | HEIGHT: 64 IN | SYSTOLIC BLOOD PRESSURE: 116 MMHG | BODY MASS INDEX: 31.58 KG/M2 | TEMPERATURE: 97.9 F | DIASTOLIC BLOOD PRESSURE: 78 MMHG | HEART RATE: 84 BPM | OXYGEN SATURATION: 97 %

## 2020-09-02 DIAGNOSIS — G47.00 INSOMNIA, UNSPECIFIED: ICD-10-CM

## 2020-09-02 PROCEDURE — 99214 OFFICE O/P EST MOD 30 MIN: CPT

## 2020-09-02 NOTE — HISTORY OF PRESENT ILLNESS
[FreeTextEntry1] : 7/2/2020:  79-year-old female seen for evaluation of sleep apnea. She tells me that 15 or 20 years ago she had a sleep study and was told she had severe sleep apnea, she believes she had an apnea hypopnea index of 72. She had bariatric surgery and lost more than 100 pounds, gained back some of this but is still at least 50 pounds below her weight at which she had her sleep study probably. She goes to bed at 1:30 AM, sleep latency 5 minutes, 3 awakenings, wakes up at 8 AM. She naps every day for 30-60 minutes. She was told of severe snoring and has been told she stops breathing during sleep. She denies morning headache, parasomnia, sleep paralysis, cataplexy, nasal or sinus congestion. She reports mild daytime sleepiness, [place] score of 7/24, but does nap every day.\par \par 9/2/2020: since last visit had unattended home sleep testing confirming very severe obstructive sleep apnea with Apnea Hypopnea Index 80s.  Ordered autoset CPAP, has not rec'd yet, spoke w durable medical equipment provider (Garth) this AM.  Prior machine is F-P from 2007, not using now. There have been no significant medical events and no new medications since the patient was last seen.

## 2020-09-02 NOTE — ASSESSMENT
[FreeTextEntry1] : obstructive sleep apnea, severe \par \par Treatment options for sleep disordered breathing were discussed.  The most rapid and successful treatment remains nasal CPAP or BilevelPAP.  Alternatives include upper airway surgery such as uvulopharyngoplasty or a dental appliance (better for milder cases).  Recently hypoglossal nerve stimulation has been used.  Positional therapy (avoidance of supine posture) can be helpful, and all patients should try to maintain a healthy weight and avoid alcohol or other sedating medications close to bedtime \par \par CPAP only option to quickly rx severe obstructive sleep apnea.  Treatment will be ordered with autotitrating CPAP, which will be downloaded after a few weeks of use to check compliance and optimize pressure based on efficacy.  If not comfortable with this treatment, polysomnography with CPAP titration may be needed.

## 2020-09-02 NOTE — PHYSICAL EXAM
[General Appearance - Well Developed] : well developed [Normal Appearance] : normal appearance [Well Groomed] : well groomed [General Appearance - Well Nourished] : well nourished [No Deformities] : no deformities [General Appearance - In No Acute Distress] : no acute distress [Normal Oropharynx] : normal oropharynx [Abnormal Walk] : normal gait [III] : III [Nail Clubbing] : no clubbing  or cyanosis of the fingernails [Musculoskeletal - Swelling] : no joint swelling seen [Motor Tone] : muscle strength and tone were normal [Oriented To Time, Place, And Person] : oriented to person, place, and time [Impaired Insight] : insight and judgment were intact [Affect] : the affect was normal [FreeTextEntry1] : overweight

## 2020-10-24 ENCOUNTER — RX RENEWAL (OUTPATIENT)
Age: 80
End: 2020-10-24

## 2020-12-21 PROBLEM — Z86.69 HISTORY OF CONJUNCTIVITIS: Status: RESOLVED | Noted: 2019-05-24 | Resolved: 2020-12-21

## 2021-01-22 ENCOUNTER — TRANSCRIPTION ENCOUNTER (OUTPATIENT)
Age: 81
End: 2021-01-22

## 2021-02-19 ENCOUNTER — TRANSCRIPTION ENCOUNTER (OUTPATIENT)
Age: 81
End: 2021-02-19

## 2021-04-19 NOTE — ASU PREOP CHECKLIST - HEART RATE (BEATS/MIN)
Medical Week 2 Survey      Responses   [facility] patient discharged from?  [facility]   Does the patient have one of the following disease processes/diagnoses(primary or secondary)?  Other   Week 2 attempt successful?  Yes   Call start time  0907   Discharge diagnosis  Hypertensive emergency, loop recorder implanted, syncope   Call end time  0909   Does the patient have a primary care provider?   Yes   Has the patient kept scheduled appointments due by today?  Yes   Comments  Has followed up with Dr. Richard and Dr. Gerard.  Has appt today with PCP   Psychosocial issues?  No   What is the patient's perception of their health status since discharge?  Improving   Is the patient/caregiver able to teach back signs and symptoms related to disease process for when to call PCP?  Yes   Is the patient/caregiver able to teach back signs and symptoms related to disease process for when to call 911?  Yes   Is the patient/caregiver able to teach back the hierarchy of who to call/visit for symptoms/problems? PCP, Specialist, Home health nurse, Urgent Care, ED, 911  Yes   Additional teach back comments  States she is doing well and staying inside.     Week 2 Call Completed?  Yes   Graduated  Yes   Did the patient feel the follow up calls were helpful during their recovery period?  Yes   Was the number of calls appropriate?  Yes   Graduated/Revoked comments  Denies questions or needs at this time          Nan Stafford LPN  
[phone]

## 2021-05-08 ENCOUNTER — RX RENEWAL (OUTPATIENT)
Age: 81
End: 2021-05-08

## 2021-05-27 ENCOUNTER — NON-APPOINTMENT (OUTPATIENT)
Age: 81
End: 2021-05-27

## 2021-07-02 ENCOUNTER — APPOINTMENT (OUTPATIENT)
Dept: INTERNAL MEDICINE | Facility: CLINIC | Age: 81
End: 2021-07-02
Payer: MEDICARE

## 2021-07-02 VITALS
WEIGHT: 200 LBS | BODY MASS INDEX: 34.15 KG/M2 | TEMPERATURE: 96.8 F | HEIGHT: 64 IN | DIASTOLIC BLOOD PRESSURE: 76 MMHG | HEART RATE: 76 BPM | RESPIRATION RATE: 17 BRPM | OXYGEN SATURATION: 96 % | SYSTOLIC BLOOD PRESSURE: 147 MMHG

## 2021-07-02 PROCEDURE — 99213 OFFICE O/P EST LOW 20 MIN: CPT

## 2021-07-02 NOTE — PHYSICAL EXAM
[No Acute Distress] : no acute distress [Well Nourished] : well nourished [Well Developed] : well developed [Well-Appearing] : well-appearing [Normal Sclera/Conjunctiva] : normal sclera/conjunctiva [PERRL] : pupils equal round and reactive to light [EOMI] : extraocular movements intact [Normal Outer Ear/Nose] : the outer ears and nose were normal in appearance [Normal Oropharynx] : the oropharynx was normal [No JVD] : no jugular venous distention [No Lymphadenopathy] : no lymphadenopathy [Supple] : supple [Thyroid Normal, No Nodules] : the thyroid was normal and there were no nodules present [No Respiratory Distress] : no respiratory distress  [No Accessory Muscle Use] : no accessory muscle use [Clear to Auscultation] : lungs were clear to auscultation bilaterally [Normal Rate] : normal rate  [Regular Rhythm] : with a regular rhythm [Normal S1, S2] : normal S1 and S2 [No Murmur] : no murmur heard [No Carotid Bruits] : no carotid bruits [No Abdominal Bruit] : a ~M bruit was not heard ~T in the abdomen [No Varicosities] : no varicosities [Pedal Pulses Present] : the pedal pulses are present [No Edema] : there was no peripheral edema [No Palpable Aorta] : no palpable aorta [No Extremity Clubbing/Cyanosis] : no extremity clubbing/cyanosis [Soft] : abdomen soft [Non Tender] : non-tender [Non-distended] : non-distended [No Masses] : no abdominal mass palpated [No HSM] : no HSM [Normal Bowel Sounds] : normal bowel sounds [Normal Posterior Cervical Nodes] : no posterior cervical lymphadenopathy [Normal Anterior Cervical Nodes] : no anterior cervical lymphadenopathy [No CVA Tenderness] : no CVA  tenderness [No Spinal Tenderness] : no spinal tenderness [No Joint Swelling] : no joint swelling [Grossly Normal Strength/Tone] : grossly normal strength/tone [No Rash] : no rash [Coordination Grossly Intact] : coordination grossly intact [No Focal Deficits] : no focal deficits [Normal Gait] : normal gait [Deep Tendon Reflexes (DTR)] : deep tendon reflexes were 2+ and symmetric [Normal Affect] : the affect was normal [Normal Insight/Judgement] : insight and judgment were intact [de-identified] : leg with edema bilateral

## 2021-07-02 NOTE — HISTORY OF PRESENT ILLNESS
[FreeTextEntry1] : Chief complaint leg swelling\par Only medicine is Norvasc [de-identified] : Had blood work done at [facility]\par  \par Had two iron infusions and may have them repeated

## 2021-07-02 NOTE — HEALTH RISK ASSESSMENT
[No] : No [No falls in past year] : Patient reported no falls in the past year [0] : 2) Feeling down, depressed, or hopeless: Not at all (0) [] : No [[id number]] : 0

## 2021-07-08 NOTE — CONSULT NOTE ADULT - SUBJECTIVE AND OBJECTIVE BOX
Patient is a 76y old  Female who presents with a chief complaint of back pain (2017 11:48)      HPI:  76F  c/o back pain worsened over time without improvement. Denies numbness, tingling. Ambulates without assist. Presents today for elective PSF L3-5. (2017 11:48)  she is complaining of back pain at the incision.  No sob nor cp    PAST MEDICAL & SURGICAL HISTORY:  Sleep apnea: uses CPAP  Spinal stenosis  HTN (hypertension)  History of cervical discectomy:   History of cosmetic surgery  H/O gastric bypass:   [facility]    FAMILY HISTORY:      SOCIAL HISTORY:  Smoking Status: [ ] Current, [ ] Former, [x ] Never  Pack Years:    MEDICATIONS:  Pulmonary:    Antimicrobials:    Anticoagulants:    Onc:    GI/:  docusate sodium 100milliGRAM(s) Oral three times a day  magnesium hydroxide Suspension 30milliLiter(s) Oral every 12 hours PRN  senna 2Tablet(s) Oral at bedtime    Endocrine:    Cardiac:  nebivolol 5milliGRAM(s) Oral daily    Other Medications:  lactated ringers. 1000milliLiter(s) IV Continuous <Continuous>  oxyCODONE  5 mG/acetaminophen 325 mG 1Tablet(s) Oral every 4 hours PRN  HYDROmorphone  Injectable 0.5milliGRAM(s) IV Push every 3 hours PRN  oxyCODONE  5 mG/acetaminophen 325 mG 2Tablet(s) Oral every 4 hours PRN  diazepam    Tablet 5milliGRAM(s) Oral every 12 hours PRN  metoclopramide Injectable 10milliGRAM(s) IV Push every 8 hours PRN      Allergies    No Known Allergies    Intolerances        Vital Signs Last 24 Hrs  T(C): 36.6, Max: 36.9 ( @ 05:34)  T(F): 97.9, Max: 98.5 ( @ 05:34)  HR: 83 (72 - 83)  BP: 134/78 (134/78 - 144/62)  BP(mean): --  RR: 15 (15 - 17)  SpO2: 96% (95% - 100%)    I & Os for current day (as of  @ 14:05)  =============================================  IN: 1340 ml / OUT: 2045 ml / NET: -705 ml        LABS:  ABG - ( 2017 15:35 )  pH: 7.46  /  pCO2: 36    /  pO2: 150   / HCO3: 25    / Base Excess: 1.5   /  SaO2: x                   CBC Full  -  ( 2017 11:39 )  WBC Count : 10.1 K/uL  Hemoglobin : 8.0 g/dL  Hematocrit : 25.1 %  Platelet Count - Automated : 167 K/uL  Mean Cell Volume : 83.4 fL  Mean Cell Hemoglobin : 26.6 pg  Mean Cell Hemoglobin Concentration : 31.9 g/dL  Auto Neutrophil # : x  Auto Lymphocyte # : x  Auto Monocyte # : x  Auto Eosinophil # : x  Auto Basophil # : x  Auto Neutrophil % : x  Auto Lymphocyte % : x  Auto Monocyte % : x  Auto Eosinophil % : x  Auto Basophil % : x    2017 08:30    137    |  101    |  9      ----------------------------<  126    3.7     |  33     |  0.48     Ca    8.4        2017 08:30  Mg     1.9       2017 08:30            Urinalysis Basic - ( 2017 02:30 )    Color: Yellow / Appearance: Clear / S.020 / pH: x  Gluc: x / Ketone: Trace mg/dL  / Bili: NEGATIVE / Urobili: 0.2 E.U./dL   Blood: x / Protein: NEGATIVE mg/dL / Nitrite: NEGATIVE   Leuk Esterase: NEGATIVE / RBC: > 10 /HPF / WBC < 5 /HPF   Sq Epi: x / Non Sq Epi: Few /HPF / Bacteria: Present /HPF                  RADIOLOGY & ADDITIONAL STUDIES (The following images were personally reviewed):
63 y/o patient with L knee injury, x-ray reveals chronic arthritis, no fracture. Will get pain control, ace wrap and discharged, patient has own orthopedist.

## 2021-08-11 ENCOUNTER — APPOINTMENT (OUTPATIENT)
Dept: INTERNAL MEDICINE | Facility: CLINIC | Age: 81
End: 2021-08-11
Payer: MEDICARE

## 2021-08-11 VITALS
WEIGHT: 200 LBS | DIASTOLIC BLOOD PRESSURE: 76 MMHG | TEMPERATURE: 97.6 F | SYSTOLIC BLOOD PRESSURE: 130 MMHG | OXYGEN SATURATION: 95 % | BODY MASS INDEX: 34.15 KG/M2 | HEIGHT: 64 IN | RESPIRATION RATE: 18 BRPM | HEART RATE: 76 BPM

## 2021-08-11 DIAGNOSIS — R06.00 DYSPNEA, UNSPECIFIED: ICD-10-CM

## 2021-08-11 DIAGNOSIS — D32.9 BENIGN NEOPLASM OF MENINGES, UNSPECIFIED: ICD-10-CM

## 2021-08-11 DIAGNOSIS — Z92.29 PERSONAL HISTORY OF OTHER DRUG THERAPY: ICD-10-CM

## 2021-08-11 PROCEDURE — G0439: CPT

## 2021-08-11 PROCEDURE — 36415 COLL VENOUS BLD VENIPUNCTURE: CPT

## 2021-08-11 RX ORDER — LEVETIRACETAM 750 MG/1
750 TABLET, FILM COATED ORAL TWICE DAILY
Qty: 180 | Refills: 3 | Status: ACTIVE | COMMUNITY

## 2021-08-11 NOTE — HEALTH RISK ASSESSMENT
[Fair] :  ~his/her~ mood as fair [No] : No [No falls in past year] : Patient reported no falls in the past year [Patient reported colonoscopy was normal] : Patient reported colonoscopy was normal [HIV test declined] : HIV test declined [Hepatitis C test declined] : Hepatitis C test declined [] : No [MammogramComments] : needs one [BoneDensityComments] : will order [ColonoscopyDate] : 02/2018

## 2021-08-11 NOTE — HISTORY OF PRESENT ILLNESS
[FreeTextEntry1] : Difficulty walking; Left knee pain [de-identified] : Medication without change\par Waits up every two to three hours a night to urinate\par Get physical therapy twice a week\par Getting iron infusions by [facility]

## 2021-08-15 LAB
25(OH)D3 SERPL-MCNC: 12.3 NG/ML
ALBUMIN SERPL ELPH-MCNC: 4 G/DL
ALP BLD-CCNC: 123 U/L
ALT SERPL-CCNC: 22 U/L
ANION GAP SERPL CALC-SCNC: 9 MMOL/L
APPEARANCE: CLEAR
AST SERPL-CCNC: 22 U/L
BACTERIA: NEGATIVE
BASOPHILS # BLD AUTO: 0.04 K/UL
BASOPHILS NFR BLD AUTO: 0.7 %
BILIRUB SERPL-MCNC: 0.2 MG/DL
BILIRUBIN URINE: NEGATIVE
BLOOD URINE: NEGATIVE
BUN SERPL-MCNC: 33 MG/DL
CALCIUM SERPL-MCNC: 8.9 MG/DL
CHLORIDE SERPL-SCNC: 104 MMOL/L
CHOLEST SERPL-MCNC: 153 MG/DL
CO2 SERPL-SCNC: 29 MMOL/L
COLOR: YELLOW
COVID-19 SPIKE DOMAIN ANTIBODY INTERPRETATION: POSITIVE
CREAT SERPL-MCNC: 0.74 MG/DL
EOSINOPHIL # BLD AUTO: 0.14 K/UL
EOSINOPHIL NFR BLD AUTO: 2.5 %
ESTIMATED AVERAGE GLUCOSE: 117 MG/DL
GLUCOSE QUALITATIVE U: NEGATIVE
GLUCOSE SERPL-MCNC: 98 MG/DL
HBA1C MFR BLD HPLC: 5.7 %
HCT VFR BLD CALC: 41.3 %
HDLC SERPL-MCNC: 83 MG/DL
HGB BLD-MCNC: 12.7 G/DL
HYALINE CASTS: 2 /LPF
IMM GRANULOCYTES NFR BLD AUTO: 0.2 %
KETONES URINE: NEGATIVE
LDLC SERPL CALC-MCNC: 60 MG/DL
LEUKOCYTE ESTERASE URINE: ABNORMAL
LYMPHOCYTES # BLD AUTO: 1.69 K/UL
LYMPHOCYTES NFR BLD AUTO: 30 %
MAN DIFF?: NORMAL
MCHC RBC-ENTMCNC: 29.1 PG
MCHC RBC-ENTMCNC: 30.8 GM/DL
MCV RBC AUTO: 94.5 FL
MICROSCOPIC-UA: NORMAL
MONOCYTES # BLD AUTO: 0.49 K/UL
MONOCYTES NFR BLD AUTO: 8.7 %
NEUTROPHILS # BLD AUTO: 3.26 K/UL
NEUTROPHILS NFR BLD AUTO: 57.9 %
NITRITE URINE: NEGATIVE
NONHDLC SERPL-MCNC: 69 MG/DL
PH URINE: 5.5
PLATELET # BLD AUTO: 225 K/UL
POTASSIUM SERPL-SCNC: 4.4 MMOL/L
PROT SERPL-MCNC: 6.2 G/DL
PROTEIN URINE: NEGATIVE
RBC # BLD: 4.37 M/UL
RBC # FLD: 19.8 %
RED BLOOD CELLS URINE: 2 /HPF
SARS-COV-2 AB SERPL IA-ACNC: >250 U/ML
SODIUM SERPL-SCNC: 142 MMOL/L
SPECIFIC GRAVITY URINE: 1.03
SQUAMOUS EPITHELIAL CELLS: 1 /HPF
T4 FREE SERPL-MCNC: 1.3 NG/DL
TRIGL SERPL-MCNC: 47 MG/DL
TSH SERPL-ACNC: 0.7 UIU/ML
UROBILINOGEN URINE: NORMAL
WBC # FLD AUTO: 5.63 K/UL
WHITE BLOOD CELLS URINE: 5 /HPF

## 2021-11-23 NOTE — ED ADULT NURSE NOTE - CADM POA PRESS ULCER
Chief Complaint   Patient presents with   • Follow-up     Cardiac management.  Pt takes aspirin daily.  Last labs done in September.  Results in chart.  Managed by PCP.  No cardiac symptoms to reports.  States doing well   • Med Refill     Pt brings in list of current medications.  No refills needed at this time.     Subjective       Sawyer Tilley is a 65 y.o. male with HTN, hyperlipidemia, and IHD s/p bypass in 2011. Lexiscan on 7/22/2019 showed inferior lateral wall infarct with jhoana-infarct ischemia. Medical management was tried with Imdur increased. He had more chest pain, on 3/3/20 he underwent cardiac cath showing patent LIMA to LAD and PHYLLIS to D1, but both OM and PDA vein grafts occluded. He appeared to have collateralization.      He returned today for follow up. He apparently had enlargement of lung nodule referred back to Dr. Cm for evaluation. CT and PET Imaging appeared to show primary Lung CA with local metastasis. He underwent left VATS with small thoracotomy and wedge resection of left lung lesions. 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 He is now following with ID specialist, Dr. Richard Koch on long-term antifungal/fluconazole.     He denies cardiac symptoms. No anginal CP, SOB, palpitations. Continues to have mild tenderness to surgical site, LLL territory. He admits to undergoing carotid US per Dr. Casye at [facility], unaware of results. Labs including CBC, CMP stable. No recent lipid. He has been unable to quit smoking.       Cardiac History:    Past Surgical History:   Procedure Laterality Date   • BACK SURGERY  02/2010   • BRONCHOSCOPY N/A 7/9/2021    Procedure: BRONCHOSCOPY WITH ENDOBRONCHIAL ULTRASOUND;  Surgeon: Rudolph Cm MD;  [facility]: [facility] ENDOSCOPY;  Service: Cardiothoracic;  Laterality: N/A;  balloon intact    • BRONCHOSCOPY N/A 7/23/2021    Procedure: BRONCHOSCOPY;  Surgeon:  Rudolph Cm MD;  Location: [facility] OR;  Service: Cardiothoracic;  Laterality: N/A;   • CARDIAC CATHETERIZATION  01/08/2011    Cath-60%LAD, 90%D1, 75%LCX, 80%OM. 100%RCA.   • CARDIAC CATHETERIZATION  03/03/2020    Patent LIMA -LAD & PHYLLIS - D1. 100% SVG -OM & SVG-PDA.   • CARDIOVASCULAR STRESS TEST  11/03/2011    Stress-4min, 41sec, 84%THR, 160/86. Inferior Ischemia   • CARDIOVASCULAR STRESS TEST  09/30/2014    L.[place]-no ischemia, small fixed inferior infarct   • CARDIOVASCULAR STRESS TEST  07/22/2019    L.Cardiolite- EF 57%. Inferolateral Infarct with periinfarct Ischemia.   • CATARACT EXTRACTION Bilateral    • COLONOSCOPY     • CORONARY ARTERY BYPASS GRAFT  01/10/2011    LIMA-LAD, SVG-OM, SVG-PDA. PHYLLIS- D1   • ECHO - CONVERTED  11/03/2011    Echo-EF 65/70%   • ECHO - CONVERTED  09/30/2004    Echo-EF 65%, mild MR, borderline pulm HTN   • ECHO - CONVERTED  07/22/2019    EF 60%. Mild- Mod MR. LA- 4.5 Cm   • ENDOSCOPY     • HYDROCELE EXCISION / REPAIR Left 2014   • OTHER SURGICAL HISTORY  01/09/2011     Carotid US- No sig. stenosis.    • THORACOSCOPY VIDEO ASSISTED WITH LOBECTOMY N/A 7/23/2021    Procedure: THORACOSCOPY VIDEO ASSISTED WITH WEDGE RESECTION;  Surgeon: Rudolph Cm MD;  Location: [facility] OR;  Service: Cardiothoracic;  Laterality: N/A;     Current Outpatient Medications   Medication Sig Dispense Refill   • aspirin 325 MG tablet Take 325 mg by mouth daily.     • atorvastatin (LIPITOR) 20 MG tablet Take 1 tablet by mouth Every Night. 90 tablet 3   • Bismuth Subsalicylate (Kaopectate) 262 MG tablet Take 262 mg by mouth Daily.     • carvedilol (COREG) 6.25 MG tablet Take 1 tablet by mouth 2 (Two) Times a Day With Meals. 180 tablet 2   • diphenoxylate-atropine (LOMOTIL) 2.5-0.025 MG per tablet Take 1 tablet by mouth Daily.     • fenofibrate (TRICOR) 145 MG tablet Take 1 tablet by mouth Daily. 90 tablet 2   • fluticasone (FLONASE) 50 MCG/ACT nasal spray 2 sprays into the nostril(s) as directed by provider  As Needed.     • isosorbide mononitrate (IMDUR) 60 MG 24 hr tablet Take 1 tablet by mouth Every Evening. 90 tablet 3   • loratadine (CLARITIN) 10 MG tablet TAKE 1 TABLET EVERY DAY (Patient taking differently: Take 10 mg by mouth Daily.) 90 tablet 2   • methocarbamol (ROBAXIN) 750 MG tablet Take 750 mg by mouth Daily.     • omeprazole (priLOSEC) 20 MG capsule Take 20 mg by mouth Daily.     • Probiotic Product (PROBIOTIC DAILY PO) Take  by mouth Daily.     • ranolazine (RANEXA) 500 MG 12 hr tablet Take 1 tablet by mouth 2 (Two) Times a Day. Needs appointment for further refills. 180 tablet 2     No current facility-administered medications for this visit.     Chantix [varenicline tartrate] and Keflex [cephalexin]    Past Medical History:   Diagnosis Date   • Anemia     Acute blood loss Anemia   • Arthritis     back   • Back pain     Chronic   • Mireles's esophagus    • Coronary artery disease 2011    NO STENTS; 4 graft CABG; MI before CABG; recent heart cath shows two grafts are blocked;    • Dyslipidemia    • GERD (gastroesophageal reflux disease)    • H pylori ulcer     Positive   • History of kidney stones     one (passed)   • Hyperlipidemia    • Hypertension    • IHD (ischemic heart disease)     s/p CABG X 4-V   • Lung mass 02/2020    routine CT scan revealed mass in 2020; monitored regularly; recent CT scan 2021 showed additional abnormalities    • Myocardial infarction (HCC) 2011   • Sleep apnea     wears CPAP   • Thrombocytopenia (HCC)    • Tinnitus    • Vitamin D deficiency      Social History     Socioeconomic History   • Marital status:    • Number of children: 2   Tobacco Use   • Smoking status: Current Every Day Smoker     Packs/day: 1.00     Years: 46.00     Pack years: 46.00     Types: Cigarettes   • Smokeless tobacco: Never Used   • Tobacco comment: patient reports has cut back on cigarette smoking, not ready to quit yet, counseled patient on effect's of smoking on health. Pt states that  he is  "quitting todat 7/1/2021   Vaping Use   • Vaping Use: Never used   Substance and Sexual Activity   • Alcohol use: No   • Drug use: No   • Sexual activity: Defer     Family History   Problem Relation Age of Onset   • Hypertension Mother    • Alzheimer's disease Mother    • Hypertension Father    • Alzheimer's disease Father    • Hypertension Other      Review of Systems   Constitutional: Negative for decreased appetite and malaise/fatigue.   HENT: Negative.    Eyes: Negative for blurred vision.   Cardiovascular: Negative for chest pain, dyspnea on exertion, leg swelling, palpitations and syncope.   Respiratory: Negative for shortness of breath and sleep disturbances due to breathing.    Endocrine: Negative.    Hematologic/Lymphatic: Negative for bleeding problem. Does not bruise/bleed easily.   Skin: Negative.    Musculoskeletal: Negative for falls and myalgias.   Gastrointestinal: Negative for abdominal pain, heartburn and melena.   Genitourinary: Negative for hematuria.   Neurological: Negative for dizziness and light-headedness.   Psychiatric/Behavioral: Negative for altered mental status.   Allergic/Immunologic: Negative.       Objective     /74 (BP Location: Right arm, Patient Position: Sitting)   Pulse 63   Temp 95.9 °F (35.5 °C) (Temporal)   Resp 16   Ht 167.6 cm (66\")   Wt 90.3 kg (199 lb)   SpO2 95%   BMI 32.12 kg/m²     Vitals and nursing note reviewed.   Constitutional:       General: Not in acute distress.     Appearance: Well-developed. Not diaphoretic.   Eyes:      Pupils: Pupils are equal, round, and reactive to light.   HENT:      Head: Normocephalic.   Pulmonary:      Effort: Pulmonary effort is normal. No respiratory distress.      Breath sounds: Normal breath sounds.   Cardiovascular:      Normal rate. Regular rhythm.      Murmurs: There is a grade 1/6 systolic murmur.   Pulses:     Intact distal pulses.   Edema:     Peripheral edema absent.   Abdominal:      General: Bowel sounds are " normal.      Palpations: Abdomen is soft.   Musculoskeletal: Normal range of motion.      Cervical back: Normal range of motion. Skin:     General: Skin is warm and dry.   Neurological:      Mental Status: Alert and oriented to person, place, and time.        Procedures          Problem List Items Addressed This Visit        Cardiac and Vasculature    CAD s/p CABG X4 (occluded vein grafts)  - Primary (Chronic)    Relevant Orders    Lipid Panel    Hypercholesteremia (Chronic)    Relevant Orders    Lipid Panel    Essential hypertension (Chronic)       Sleep    LLUVIA on CPAP (Chronic)         1. CAD- s/p CABG in 2011. Most recent cath in 2020 showed patent LIMA and PHYLLIS with two occluded vein grafts with collateralization. He has no anginal pain. Continue aspirin, statin, beta blocker, Ranexa.     2. HTN- well controlled, continue same plan. Limit Na.     3. Hypercholesterolemia- managed with Lipitor and fenofibrate. He was given an order to repeat lipids.     4. 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     5. LLUVIA- on CPAP    6. COPD- he has been unable to quit smoking.      Cardiac status appears stable. He has no anginal symptoms. We reviewed cardiac cath findings. Copy of carotid US requested.     Patient's Body mass index is 32.12 kg/m². indicating that he is obese (BMI >30). Obesity-related health conditions include the following: obstructive sleep apnea, hypertension, coronary heart disease, diabetes mellitus and dyslipidemias. Obesity is unchanged. BMI is is above average; BMI management plan is completed. We discussed portion control and increasing exercise..     Sawyer Shea Treva  reports that he has been smoking cigarettes. He has a 46.00 pack-year smoking history. He has never used smokeless tobacco.                   Electronically signed by KATHLEEN Purcell,  November 28, 2021 14:45 EST   No

## 2022-05-24 ENCOUNTER — RX RENEWAL (OUTPATIENT)
Age: 82
End: 2022-05-24

## 2022-06-17 ENCOUNTER — NON-APPOINTMENT (OUTPATIENT)
Age: 82
End: 2022-06-17

## 2022-06-17 ENCOUNTER — APPOINTMENT (OUTPATIENT)
Dept: OPHTHALMOLOGY | Facility: CLINIC | Age: 82
End: 2022-06-17

## 2022-06-17 PROCEDURE — 92250 FUNDUS PHOTOGRAPHY W/I&R: CPT

## 2022-06-17 PROCEDURE — 92012 INTRM OPH EXAM EST PATIENT: CPT

## 2022-06-30 ENCOUNTER — RX RENEWAL (OUTPATIENT)
Age: 82
End: 2022-06-30

## 2022-08-04 ENCOUNTER — APPOINTMENT (OUTPATIENT)
Dept: OPHTHALMOLOGY | Facility: CLINIC | Age: 82
End: 2022-08-04

## 2022-08-04 ENCOUNTER — NON-APPOINTMENT (OUTPATIENT)
Age: 82
End: 2022-08-04

## 2022-08-04 PROCEDURE — 92012 INTRM OPH EXAM EST PATIENT: CPT

## 2022-10-14 ENCOUNTER — APPOINTMENT (OUTPATIENT)
Dept: INTERNAL MEDICINE | Facility: CLINIC | Age: 82
End: 2022-10-14

## 2022-10-14 ENCOUNTER — NON-APPOINTMENT (OUTPATIENT)
Age: 82
End: 2022-10-14

## 2022-10-14 VITALS
WEIGHT: 159 LBS | SYSTOLIC BLOOD PRESSURE: 133 MMHG | DIASTOLIC BLOOD PRESSURE: 72 MMHG | HEART RATE: 78 BPM | OXYGEN SATURATION: 95 % | BODY MASS INDEX: 27.14 KG/M2 | TEMPERATURE: 97.8 F | HEIGHT: 64 IN

## 2022-10-14 DIAGNOSIS — Z01.818 ENCOUNTER FOR OTHER PREPROCEDURAL EXAMINATION: ICD-10-CM

## 2022-10-14 DIAGNOSIS — G47.33 OBSTRUCTIVE SLEEP APNEA (ADULT) (PEDIATRIC): ICD-10-CM

## 2022-10-14 PROCEDURE — 93000 ELECTROCARDIOGRAM COMPLETE: CPT

## 2022-10-14 PROCEDURE — 99213 OFFICE O/P EST LOW 20 MIN: CPT | Mod: 25

## 2022-10-14 PROCEDURE — 36415 COLL VENOUS BLD VENIPUNCTURE: CPT

## 2022-10-14 NOTE — HISTORY OF PRESENT ILLNESS
[No Pertinent Cardiac History] : no history of aortic stenosis, atrial fibrillation, coronary artery disease, recent myocardial infarction, or implantable device/pacemaker [Sleep Apnea] : sleep apnea [No Adverse Anesthesia Reaction] : no adverse anesthesia reaction in self or family member [(Patient denies any chest pain, claudication, dyspnea on exertion, orthopnea, palpitations or syncope)] : Patient denies any chest pain, claudication, dyspnea on exertion, orthopnea, palpitations or syncope [Excellent (>10 METs)] : Excellent (>10 METs) [FreeTextEntry1] : Cataract surgery [FreeTextEntry2] : 10/31 [FreeTextEntry4] : Scheduled for cataract surgery on 10/31. Vision without glasses is blurry, left is worse than the right. Blurry with glasses. Unlimited METS\par \par Curious about breast reduction surgery given back pain from large breasts, will readdress at next visit [FreeTextEntry7] : EKG

## 2022-10-14 NOTE — ASSESSMENT
[High Risk Surgery - Intraperitoneal, Intrathoracic or Supringuinal Vascular Procedures] : High Risk Surgery - Intraperitoneal, Intrathoracic or Supringuinal Vascular Procedures - No (0) [Ischemic Heart Disease] : Ischemic Heart Disease - No (0) [Prior Cerebrovascular Accident or TIA] : Prior Cerebrovascular Accident or TIA - No (0) [Creatinine >= 2mg/dL (1 Point)] : Creatinine >= 2mg/dL - No (0) [Insulin-dependent Diabetic (1 Point)] : Insulin-dependent Diabetic - No (0) [0] : 0 , RCRI Class: I, Risk of Post-Op Cardiac Complications: 3.9%, 95% CI for Risk Estimate: 2.8% - 5.4% [Patient Requires Further Testing] : Patient requires further testing [ECG] : ECG [FreeTextEntry4] : RCRI 0, Carter 0, low risk for low risk procedure, patient medically optimized pending results of labs

## 2022-10-16 LAB
ALBUMIN SERPL ELPH-MCNC: 4.1 G/DL
ALP BLD-CCNC: 102 U/L
ALT SERPL-CCNC: 32 U/L
ANION GAP SERPL CALC-SCNC: 13 MMOL/L
APPEARANCE: CLEAR
APTT BLD: 32.3 SEC
AST SERPL-CCNC: 32 U/L
BACTERIA: NEGATIVE
BASOPHILS # BLD AUTO: 0.04 K/UL
BASOPHILS NFR BLD AUTO: 0.7 %
BILIRUB SERPL-MCNC: 0.3 MG/DL
BILIRUBIN URINE: NEGATIVE
BLOOD URINE: NEGATIVE
BUN SERPL-MCNC: 34 MG/DL
CALCIUM SERPL-MCNC: 9 MG/DL
CHLORIDE SERPL-SCNC: 101 MMOL/L
CO2 SERPL-SCNC: 27 MMOL/L
COLOR: NORMAL
CREAT SERPL-MCNC: 0.74 MG/DL
EGFR: 82 ML/MIN/1.73M2
EOSINOPHIL # BLD AUTO: 0.21 K/UL
EOSINOPHIL NFR BLD AUTO: 3.4 %
GLUCOSE QUALITATIVE U: NEGATIVE
GLUCOSE SERPL-MCNC: 92 MG/DL
HCT VFR BLD CALC: 34.8 %
HGB BLD-MCNC: 11.4 G/DL
HYALINE CASTS: 0 /LPF
IMM GRANULOCYTES NFR BLD AUTO: 0.2 %
INR PPP: 0.93 RATIO
KETONES URINE: ABNORMAL
LEUKOCYTE ESTERASE URINE: NEGATIVE
LYMPHOCYTES # BLD AUTO: 1.33 K/UL
LYMPHOCYTES NFR BLD AUTO: 21.7 %
MAN DIFF?: NORMAL
MCHC RBC-ENTMCNC: 31.1 PG
MCHC RBC-ENTMCNC: 32.8 GM/DL
MCV RBC AUTO: 94.8 FL
MICROSCOPIC-UA: NORMAL
MONOCYTES # BLD AUTO: 0.48 K/UL
MONOCYTES NFR BLD AUTO: 7.8 %
NEUTROPHILS # BLD AUTO: 4.05 K/UL
NEUTROPHILS NFR BLD AUTO: 66.2 %
NITRITE URINE: NEGATIVE
PH URINE: 6
PLATELET # BLD AUTO: 224 K/UL
POTASSIUM SERPL-SCNC: 4.3 MMOL/L
PROT SERPL-MCNC: 6.1 G/DL
PROTEIN URINE: NEGATIVE
PT BLD: 11 SEC
RBC # BLD: 3.67 M/UL
RBC # FLD: 13.2 %
RED BLOOD CELLS URINE: 3 /HPF
SODIUM SERPL-SCNC: 141 MMOL/L
SPECIFIC GRAVITY URINE: 1.02
SQUAMOUS EPITHELIAL CELLS: 1 /HPF
UROBILINOGEN URINE: NORMAL
WBC # FLD AUTO: 6.12 K/UL
WHITE BLOOD CELLS URINE: 1 /HPF

## 2022-10-20 ENCOUNTER — RESULT REVIEW (OUTPATIENT)
Age: 82
End: 2022-10-20

## 2022-10-20 ENCOUNTER — OUTPATIENT (OUTPATIENT)
Dept: OUTPATIENT SERVICES | Facility: HOSPITAL | Age: 82
LOS: 1 days | End: 2022-10-20
Payer: MEDICARE

## 2022-10-20 DIAGNOSIS — Z98.890 OTHER SPECIFIED POSTPROCEDURAL STATES: Chronic | ICD-10-CM

## 2022-10-20 DIAGNOSIS — Z86.018 PERSONAL HISTORY OF OTHER BENIGN NEOPLASM: Chronic | ICD-10-CM

## 2022-10-20 PROCEDURE — 77085 DXA BONE DENSITY AXL VRT FX: CPT | Mod: 26

## 2022-10-20 PROCEDURE — 71046 X-RAY EXAM CHEST 2 VIEWS: CPT | Mod: 26

## 2022-10-20 PROCEDURE — 77085 DXA BONE DENSITY AXL VRT FX: CPT

## 2022-10-20 PROCEDURE — 71046 X-RAY EXAM CHEST 2 VIEWS: CPT

## 2022-10-25 ENCOUNTER — NON-APPOINTMENT (OUTPATIENT)
Age: 82
End: 2022-10-25

## 2022-10-28 NOTE — ASU PATIENT PROFILE, ADULT - FALL HARM RISK - HARM RISK INTERVENTIONS
Communicate Risk of Fall with Harm to all staff/Reinforce activity limits and safety measures with patient and family/Tailored Fall Risk Interventions/Visual Cue: Yellow wristband and red socks/Bed in lowest position, wheels locked, appropriate side rails in place/Call bell, personal items and telephone in reach/Instruct patient to call for assistance before getting out of bed or chair/Non-slip footwear when patient is out of bed/[place] to call system/Physically safe environment - no spills, clutter or unnecessary equipment/Purposeful Proactive Rounding/Room/bathroom lighting operational, light cord in reach Assistance with ambulation/Assistance OOB with selected safe patient handling equipment/Communicate Risk of Fall with Harm to all staff/Discuss with provider need for PT consult/Monitor gait and stability/Provide patient with walking aids - walker, cane, crutches/Reinforce activity limits and safety measures with patient and family/Tailored Fall Risk Interventions/Visual Cue: Yellow wristband and red socks/Bed in lowest position, wheels locked, appropriate side rails in place/Call bell, personal items and telephone in reach/Instruct patient to call for assistance before getting out of bed or chair/Non-slip footwear when patient is out of bed/[place] to call system/Physically safe environment - no spills, clutter or unnecessary equipment/Purposeful Proactive Rounding/Room/bathroom lighting operational, light cord in reach

## 2022-10-28 NOTE — ASU PATIENT PROFILE, ADULT - VISION (WITH CORRECTIVE LENSES IF THE PATIENT USUALLY WEARS THEM):
need right eye surgery/Partially impaired: cannot see medication labels or newsprint, but can see obstacles in path, and the surrounding layout; can count fingers at arm's length

## 2022-10-28 NOTE — ASU PATIENT PROFILE, ADULT - NSICDXPASTSURGICALHX_GEN_ALL_CORE_FT
PAST SURGICAL HISTORY:  H/O gastric bypass 2002    History of benign breast tumor     History of cervical discectomy 2012    History of cosmetic surgery

## 2022-10-28 NOTE — ASU PATIENT PROFILE, ADULT - NS PRO LACT YNNA
Epidermal Autograft Text: The defect edges were debeveled with a #15 scalpel blade.  Given the location of the defect, shape of the defect and the proximity to free margins an epidermal autograft was deemed most appropriate.  Using a sterile surgical marker, the primary defect shape was transferred to the donor site. The epidermal graft was then harvested.  The skin graft was then placed in the primary defect and oriented appropriately. no

## 2022-10-28 NOTE — ASU PATIENT PROFILE, ADULT - NS PREOP UNDERSTANDS INFO
yes Spoke to patient, to be NPO after 12Mn, no solid foods, or milky dairy products,, dress comfortable, can drink sip of water with med on am before procedure, no drinking , no smoking, no lotions, bring ID photo, insurance and vaccination cards, escort arranged, address and telephone given to patient./yes

## 2022-10-28 NOTE — ASU PATIENT PROFILE, ADULT - NSICDXPASTMEDICALHX_GEN_ALL_CORE_FT
PAST MEDICAL HISTORY:  Brain mass     Depression     HTN (hypertension)     Sleep apnea uses CPAP    Spinal stenosis

## 2022-10-29 RX ORDER — SODIUM CHLORIDE 9 MG/ML
1000 INJECTION, SOLUTION INTRAVENOUS
Refills: 0 | Status: DISCONTINUED | OUTPATIENT
Start: 2022-10-31 | End: 2022-10-31

## 2022-10-29 RX ORDER — ACETAMINOPHEN 500 MG
650 TABLET ORAL EVERY 6 HOURS
Refills: 0 | Status: DISCONTINUED | OUTPATIENT
Start: 2022-10-31 | End: 2022-10-31

## 2022-10-29 NOTE — OPERATIVE REPORT - OPERATIVE RPOSRT DETAILS
DATE: 10/31/22  PATIENT:PETE SNELL  MRN:[id number]  SURGEON: Wily Mcfarlane MD  ASSISTANT: Nani Amaral MD    PREOPERATIVE DIAGNOSIS:  Cataract, Astigmatism-Right eye  POSTOPERATIVE DIAGNOSIS:  Same, Right eye  OPERATIVE PROCEDURE:  Femtosecond laser assisted cataract surgery (FLACS) with insertion of posterior chamber intraocular lens, Right eye  LENS USED: MI60L  LENS POWER: +17.5 D    PROCEDURE:  The patient was brought into the laser room. After installation of topical anesthetic the femtosecond laser was used for select steps of the cataract procedure.   The patient was brought into the operating room and placed supine on the operating room table. After uneventful induction of neuroleptic anesthesia, additional tetracaine was instilled into the operative eye achieving sufficient anesthesia. The patient was then prepped and draped in the usual sterile fashion. A wire lid speculum was then inserted into the operative eye giving a wide palpebral fissure.    A rodriguez knife was used to perform a paracentesis through clear cornea at the 10 o'clock limbal position. The anterior chamber was irrigated with lidocaine 1% nonpreserved. When available preservative free epinephrine was also placed intracamerally for additional pupil dilation.  Viscoelastic was then used to maintain the anterior chamber. A keratome was used to create a clear corneal incision just inside the temporal limbus. An Utrata forceps was used to complete the anterior capsulorrhexis and the freed capsule was removed from the eye. Balanced salt solution was used to hydrodissect the nucleus. The phacoemulsification unit was then used to completely phacoemulsify the nucleus, following which an aspirating hand piece was used to aspirate all cortical remnants from the capsular bag. Viscoelastic was again used to reform the anterior chamber and capsular bag.    A new foldable posterior chamber intraocular lens was brought into the surgical field. It was folded and directly injected into the capsular bag following which it was centered and secure. All viscoelastic was then aspirated from the anterior segment using an aspirating hand piece. All wounds were hydrated and found to be watertight.  The wounds remained watertight. The lid speculum was removed from the eye and a shield placed over the eye.  An antibiotic/steroid mixture was irrigated into the conjunctival cul de sac. The patient tolerated the procedure well and went to the recovery area in good condition.   DATE: 10/31/22  PATIENT:PETE SNELL  MRN:[id number]  SURGEON: Wily Mcfarlane MD  ASSISTANT: Ramón Angelo MD    PREOPERATIVE DIAGNOSIS:  Cataract, Astigmatism-Right eye  POSTOPERATIVE DIAGNOSIS:  Same, Right eye  OPERATIVE PROCEDURE:  Femtosecond laser assisted cataract surgery (FLACS) with insertion of posterior chamber intraocular lens, Right eye  LENS USED: MI60L  LENS POWER: +17.5 D    PROCEDURE:  The patient was brought into the laser room. After installation of topical anesthetic the femtosecond laser was used for select steps of the cataract procedure.   The patient was brought into the operating room and placed supine on the operating room table. After uneventful induction of neuroleptic anesthesia, additional tetracaine was instilled into the operative eye achieving sufficient anesthesia. The patient was then prepped and draped in the usual sterile fashion. A wire lid speculum was then inserted into the operative eye giving a wide palpebral fissure.    A rodriguez knife was used to perform a paracentesis through clear cornea at the 10 o'clock limbal position. The anterior chamber was irrigated with lidocaine 1% nonpreserved. When available preservative free epinephrine was also placed intracamerally for additional pupil dilation.  Viscoelastic was then used to maintain the anterior chamber. A keratome was used to create a clear corneal incision just inside the temporal limbus. An Utrata forceps was used to complete the anterior capsulorrhexis and the freed capsule was removed from the eye. Balanced salt solution was used to hydrodissect the nucleus. The phacoemulsification unit was then used to completely phacoemulsify the nucleus, following which an aspirating hand piece was used to aspirate all cortical remnants from the capsular bag. Viscoelastic was again used to reform the anterior chamber and capsular bag.    A new foldable posterior chamber intraocular lens was brought into the surgical field. It was folded and directly injected into the capsular bag following which it was centered and secure. All viscoelastic was then aspirated from the anterior segment using an aspirating hand piece. All wounds were hydrated and found to be watertight.  The wounds remained watertight. The lid speculum was removed from the eye and a shield placed over the eye.  An antibiotic/steroid mixture was irrigated into the conjunctival cul de sac. The patient tolerated the procedure well and went to the recovery area in good condition.

## 2022-10-31 ENCOUNTER — OUTPATIENT (OUTPATIENT)
Dept: OUTPATIENT SERVICES | Facility: HOSPITAL | Age: 82
LOS: 1 days | Discharge: HOME | End: 2022-10-31

## 2022-10-31 ENCOUNTER — NON-APPOINTMENT (OUTPATIENT)
Age: 82
End: 2022-10-31

## 2022-10-31 ENCOUNTER — TRANSCRIPTION ENCOUNTER (OUTPATIENT)
Age: 82
End: 2022-10-31

## 2022-10-31 ENCOUNTER — APPOINTMENT (OUTPATIENT)
Dept: OPHTHALMOLOGY | Facility: AMBULATORY SURGERY CENTER | Age: 82
End: 2022-10-31

## 2022-10-31 VITALS
SYSTOLIC BLOOD PRESSURE: 127 MMHG | RESPIRATION RATE: 16 BRPM | HEART RATE: 62 BPM | DIASTOLIC BLOOD PRESSURE: 50 MMHG | HEIGHT: 64 IN | TEMPERATURE: 100 F | WEIGHT: 160.94 LBS | OXYGEN SATURATION: 96 %

## 2022-10-31 VITALS
RESPIRATION RATE: 16 BRPM | TEMPERATURE: 98 F | HEART RATE: 63 BPM | SYSTOLIC BLOOD PRESSURE: 108 MMHG | DIASTOLIC BLOOD PRESSURE: 66 MMHG | OXYGEN SATURATION: 96 %

## 2022-10-31 DIAGNOSIS — Z98.890 OTHER SPECIFIED POSTPROCEDURAL STATES: Chronic | ICD-10-CM

## 2022-10-31 DIAGNOSIS — Z86.018 PERSONAL HISTORY OF OTHER BENIGN NEOPLASM: Chronic | ICD-10-CM

## 2022-10-31 PROCEDURE — 66984 XCAPSL CTRC RMVL W/O ECP: CPT | Mod: RT

## 2022-10-31 PROCEDURE — 6698F: CPT | Mod: RT

## 2022-10-31 DEVICE — IMPLANTABLE DEVICE
Type: IMPLANTABLE DEVICE | Status: NON-FUNCTIONAL
Removed: 2022-10-31

## 2022-10-31 RX ORDER — CYCLOPENTOLATE HYDROCHLORIDE 10 MG/ML
1 SOLUTION/ DROPS OPHTHALMIC
Refills: 0 | Status: COMPLETED | OUTPATIENT
Start: 2022-10-31 | End: 2022-10-31

## 2022-10-31 RX ORDER — TROPICAMIDE 1 %
1 DROPS OPHTHALMIC (EYE)
Refills: 0 | Status: COMPLETED | OUTPATIENT
Start: 2022-10-31 | End: 2022-10-31

## 2022-10-31 RX ORDER — OFLOXACIN 0.3 %
1 DROPS OPHTHALMIC (EYE)
Refills: 0 | Status: COMPLETED | OUTPATIENT
Start: 2022-10-31 | End: 2022-10-31

## 2022-10-31 RX ORDER — PHENYLEPHRINE HCL 2.5 %
1 DROPS OPHTHALMIC (EYE)
Refills: 0 | Status: COMPLETED | OUTPATIENT
Start: 2022-10-31 | End: 2022-10-31

## 2022-10-31 RX ADMIN — Medication 1 DROP(S): at 08:38

## 2022-10-31 RX ADMIN — Medication 1 DROP(S): at 08:50

## 2022-10-31 RX ADMIN — CYCLOPENTOLATE HYDROCHLORIDE 1 DROP(S): 10 SOLUTION/ DROPS OPHTHALMIC at 08:56

## 2022-10-31 RX ADMIN — Medication 1 DROP(S): at 08:57

## 2022-10-31 RX ADMIN — Medication 1 DROP(S): at 08:58

## 2022-10-31 RX ADMIN — Medication 1 DROP(S): at 08:56

## 2022-10-31 RX ADMIN — Medication 1 DROP(S): at 08:49

## 2022-10-31 RX ADMIN — CYCLOPENTOLATE HYDROCHLORIDE 1 DROP(S): 10 SOLUTION/ DROPS OPHTHALMIC at 08:49

## 2022-10-31 RX ADMIN — CYCLOPENTOLATE HYDROCHLORIDE 1 DROP(S): 10 SOLUTION/ DROPS OPHTHALMIC at 08:37

## 2022-10-31 NOTE — ASU DISCHARGE PLAN (ADULT/PEDIATRIC) - NS MD DC FALL RISK RISK
For information on Fall & Injury Prevention, visit: [email or web address] OR  [email or web address] OR  https://www.cdc.gov/steadi/patient.html

## 2022-11-01 ENCOUNTER — APPOINTMENT (OUTPATIENT)
Dept: OPHTHALMOLOGY | Facility: CLINIC | Age: 82
End: 2022-11-01

## 2022-11-01 ENCOUNTER — NON-APPOINTMENT (OUTPATIENT)
Age: 82
End: 2022-11-01

## 2022-11-01 PROCEDURE — 99024 POSTOP FOLLOW-UP VISIT: CPT

## 2022-11-04 RX ORDER — SODIUM CHLORIDE 9 MG/ML
1000 INJECTION, SOLUTION INTRAVENOUS
Refills: 0 | Status: DISCONTINUED | OUTPATIENT
Start: 2022-11-07 | End: 2022-11-07

## 2022-11-04 NOTE — ASU PATIENT PROFILE, ADULT - NSICDXPASTSURGICALHX_GEN_ALL_CORE_FT
PAST SURGICAL HISTORY:  Cataract extraction status, right     H/O gastric bypass 2002    History of benign breast tumor     History of cervical discectomy 2012    History of cosmetic surgery

## 2022-11-04 NOTE — OPERATIVE REPORT - OPERATIVE RPOSRT DETAILS
PATIENT: PETE SNELL  MRN: [id number]  PREOPERATIVE DIAGNOSIS:  Cataract, Astigmatism-Left eye  POSTOPERATIVE DIAGNOSIS:  Same- Left eye  OPERATIVE PROCEDURE:  Femtosecond laser assisted cataract surgery (FLACS) with insertion of posterior chamber intraocular lens, Left eye  SURGEON: Wily Mcfarlane MD  ASSISTANT: Nani Amaral MD  SURGERY DATE: 11/7/22    LENS USED: MI60L   LENS POWER: +19.5    PROCEDURE:  The patient was brought into the laser room. After installation of topical anesthetic the femtosecond laser was used for select steps of the cataract procedure.     The patient was brought into the operating room and placed supine on the operating room table. After uneventful induction of neuroleptic anesthesia, additional tetracaine  was instilled into the operative eye achieving sufficient anesthesia. The patient was then prepped and draped in the usual sterile fashion. A wire lid speculum was then inserted into the operative eye giving a wide palpebral fissure.      A rodriguez knife was used to perform a paracentesis through clear cornea at the 5 o'clock limbal position. The anterior chamber was irrigated with lidocaine 1% nonpreserved. When available preservative free epinephrine was also placed intracamerally for additional pupil dilation.  Viscoelastic was then used to maintain the anterior chamber. A keratome was used to create a clear corneal incision just inside the temporal limbus. An Utrata forceps was used to complete the anterior capsulorrhexis and the freed capsule was removed from the eye. Balanced salt solution was used to hydrodissect the nucleus. The phacoemulsification unit was then used to completely phacoemulsify the nucleus, following which an aspirating hand piece was used to aspirate all cortical remnants from the capsular bag. Viscoelastic was again used to reform the anterior chamber and capsular bag.    A new foldable posterior chamber intraocular lens was brought into the surgical field. It was folded and directly injected into the capsular bag following which it was centered and secure. All viscoelastic was then aspirated from the anterior segment using an aspirating hand piece. All wounds were hydrated and found to be watertight.  The wounds remained watertight. The lid speculum was removed from the eye and a shield placed over the eye.  An antibiotic/steroid mixture was irrigated into the conjunctival cul de sac. The patient tolerated the procedure well and went to the recovery area in good condition.

## 2022-11-07 ENCOUNTER — APPOINTMENT (OUTPATIENT)
Dept: OPHTHALMOLOGY | Facility: AMBULATORY SURGERY CENTER | Age: 82
End: 2022-11-07

## 2022-11-07 ENCOUNTER — OUTPATIENT (OUTPATIENT)
Dept: OUTPATIENT SERVICES | Facility: HOSPITAL | Age: 82
LOS: 1 days | Discharge: HOME | End: 2022-11-07

## 2022-11-07 ENCOUNTER — NON-APPOINTMENT (OUTPATIENT)
Age: 82
End: 2022-11-07

## 2022-11-07 VITALS
RESPIRATION RATE: 16 BRPM | OXYGEN SATURATION: 98 % | DIASTOLIC BLOOD PRESSURE: 53 MMHG | SYSTOLIC BLOOD PRESSURE: 114 MMHG | TEMPERATURE: 97 F | HEART RATE: 50 BPM

## 2022-11-07 VITALS
TEMPERATURE: 98 F | OXYGEN SATURATION: 97 % | SYSTOLIC BLOOD PRESSURE: 139 MMHG | HEART RATE: 55 BPM | RESPIRATION RATE: 17 BRPM | WEIGHT: 158.73 LBS | HEIGHT: 64 IN | DIASTOLIC BLOOD PRESSURE: 47 MMHG

## 2022-11-07 DIAGNOSIS — Z86.018 PERSONAL HISTORY OF OTHER BENIGN NEOPLASM: Chronic | ICD-10-CM

## 2022-11-07 DIAGNOSIS — Z98.41 CATARACT EXTRACTION STATUS, RIGHT EYE: Chronic | ICD-10-CM

## 2022-11-07 DIAGNOSIS — Z98.890 OTHER SPECIFIED POSTPROCEDURAL STATES: Chronic | ICD-10-CM

## 2022-11-07 PROCEDURE — 66984 XCAPSL CTRC RMVL W/O ECP: CPT | Mod: 79,LT

## 2022-11-07 PROCEDURE — 6698F: CPT | Mod: LT

## 2022-11-07 DEVICE — IMPLANTABLE DEVICE
Type: IMPLANTABLE DEVICE | Site: LEFT | Status: NON-FUNCTIONAL
Removed: 2022-11-07

## 2022-11-07 RX ORDER — LEVETIRACETAM 250 MG/1
1 TABLET, FILM COATED ORAL
Qty: 0 | Refills: 0 | DISCHARGE

## 2022-11-07 RX ORDER — ACETAMINOPHEN 500 MG
650 TABLET ORAL ONCE
Refills: 0 | Status: DISCONTINUED | OUTPATIENT
Start: 2022-11-07 | End: 2022-11-07

## 2022-11-07 RX ORDER — CYCLOPENTOLATE HYDROCHLORIDE 10 MG/ML
1 SOLUTION/ DROPS OPHTHALMIC
Refills: 0 | Status: COMPLETED | OUTPATIENT
Start: 2022-11-07 | End: 2022-11-07

## 2022-11-07 RX ORDER — OFLOXACIN 0.3 %
1 DROPS OPHTHALMIC (EYE)
Refills: 0 | Status: COMPLETED | OUTPATIENT
Start: 2022-11-07 | End: 2022-11-07

## 2022-11-07 RX ORDER — LOSARTAN POTASSIUM 100 MG/1
12.5 TABLET, FILM COATED ORAL
Qty: 0 | Refills: 0 | DISCHARGE

## 2022-11-07 RX ORDER — SERTRALINE 25 MG/1
75 TABLET, FILM COATED ORAL
Qty: 0 | Refills: 0 | DISCHARGE

## 2022-11-07 RX ORDER — PHENYLEPHRINE HCL 2.5 %
1 DROPS OPHTHALMIC (EYE)
Refills: 0 | Status: COMPLETED | OUTPATIENT
Start: 2022-11-07 | End: 2022-11-07

## 2022-11-07 RX ORDER — TROPICAMIDE 1 %
1 DROPS OPHTHALMIC (EYE)
Refills: 0 | Status: COMPLETED | OUTPATIENT
Start: 2022-11-07 | End: 2022-11-07

## 2022-11-07 RX ORDER — ACETAMINOPHEN 500 MG
650 TABLET ORAL EVERY 6 HOURS
Refills: 0 | Status: DISCONTINUED | OUTPATIENT
Start: 2022-11-07 | End: 2022-11-07

## 2022-11-07 RX ADMIN — CYCLOPENTOLATE HYDROCHLORIDE 1 DROP(S): 10 SOLUTION/ DROPS OPHTHALMIC at 08:26

## 2022-11-07 RX ADMIN — Medication 1 DROP(S): at 08:21

## 2022-11-07 RX ADMIN — CYCLOPENTOLATE HYDROCHLORIDE 1 DROP(S): 10 SOLUTION/ DROPS OPHTHALMIC at 08:22

## 2022-11-07 RX ADMIN — Medication 1 DROP(S): at 08:32

## 2022-11-07 RX ADMIN — Medication 1 DROP(S): at 08:26

## 2022-11-07 RX ADMIN — Medication 1 DROP(S): at 08:22

## 2022-11-07 RX ADMIN — CYCLOPENTOLATE HYDROCHLORIDE 1 DROP(S): 10 SOLUTION/ DROPS OPHTHALMIC at 08:32

## 2022-11-07 RX ADMIN — SODIUM CHLORIDE 40 MILLILITER(S): 9 INJECTION, SOLUTION INTRAVENOUS at 10:24

## 2022-11-08 ENCOUNTER — NON-APPOINTMENT (OUTPATIENT)
Age: 82
End: 2022-11-08

## 2022-11-08 ENCOUNTER — APPOINTMENT (OUTPATIENT)
Dept: OPHTHALMOLOGY | Facility: CLINIC | Age: 82
End: 2022-11-08

## 2022-11-08 PROCEDURE — 99024 POSTOP FOLLOW-UP VISIT: CPT

## 2022-11-15 ENCOUNTER — APPOINTMENT (OUTPATIENT)
Dept: OPHTHALMOLOGY | Facility: CLINIC | Age: 82
End: 2022-11-15

## 2022-11-15 ENCOUNTER — NON-APPOINTMENT (OUTPATIENT)
Age: 82
End: 2022-11-15

## 2022-11-15 PROCEDURE — 99024 POSTOP FOLLOW-UP VISIT: CPT

## 2022-11-28 ENCOUNTER — NON-APPOINTMENT (OUTPATIENT)
Age: 82
End: 2022-11-28

## 2022-12-01 ENCOUNTER — APPOINTMENT (OUTPATIENT)
Dept: ENDOCRINOLOGY | Facility: CLINIC | Age: 82
End: 2022-12-01

## 2022-12-01 VITALS
DIASTOLIC BLOOD PRESSURE: 66 MMHG | HEART RATE: 57 BPM | SYSTOLIC BLOOD PRESSURE: 138 MMHG | WEIGHT: 158 LBS | BODY MASS INDEX: 27.12 KG/M2

## 2022-12-01 DIAGNOSIS — Z87.891 PERSONAL HISTORY OF NICOTINE DEPENDENCE: ICD-10-CM

## 2022-12-01 PROCEDURE — 99205 OFFICE O/P NEW HI 60 MIN: CPT

## 2022-12-01 RX ORDER — AMLODIPINE BESYLATE 5 MG/1
5 TABLET ORAL
Qty: 30 | Refills: 5 | Status: DISCONTINUED | COMMUNITY
Start: 2020-05-01 | End: 2022-12-01

## 2022-12-01 RX ORDER — NEBIVOLOL HYDROCHLORIDE 5 MG/1
5 TABLET ORAL
Qty: 90 | Refills: 3 | Status: DISCONTINUED | COMMUNITY
Start: 2019-09-20 | End: 2022-12-01

## 2022-12-01 RX ORDER — BUPROPION HYDROCHLORIDE 300 MG/1
300 TABLET, EXTENDED RELEASE ORAL
Qty: 30 | Refills: 0 | Status: DISCONTINUED | COMMUNITY
Start: 2019-02-27 | End: 2022-12-01

## 2022-12-01 RX ORDER — CHOLECALCIFEROL (VITAMIN D3) 1250 MCG
1.25 MG CAPSULE ORAL
Qty: 4 | Refills: 0 | Status: DISCONTINUED | COMMUNITY
Start: 2019-05-08 | End: 2022-12-01

## 2022-12-01 RX ORDER — TOBRAMYCIN AND DEXAMETHASONE 3; 1 MG/ML; MG/ML
0.3-0.1 SUSPENSION/ DROPS OPHTHALMIC 4 TIMES DAILY
Qty: 1 | Refills: 2 | Status: DISCONTINUED | COMMUNITY
Start: 2019-05-24 | End: 2022-12-01

## 2022-12-01 RX ORDER — SERTRALINE HYDROCHLORIDE 50 MG/1
50 TABLET, FILM COATED ORAL
Qty: 30 | Refills: 0 | Status: ACTIVE | COMMUNITY
Start: 2022-11-16

## 2022-12-01 RX ORDER — CLOBETASOL PROPIONATE 0.5 MG/G
0.05 CREAM TOPICAL 3 TIMES DAILY
Qty: 15 | Refills: 3 | Status: DISCONTINUED | COMMUNITY
Start: 2019-09-20 | End: 2022-12-01

## 2022-12-01 RX ORDER — NEBIVOLOL HYDROCHLORIDE 5 MG/1
5 TABLET ORAL
Qty: 90 | Refills: 3 | Status: DISCONTINUED | COMMUNITY
End: 2022-12-01

## 2022-12-05 ENCOUNTER — LABORATORY RESULT (OUTPATIENT)
Age: 82
End: 2022-12-05

## 2022-12-06 ENCOUNTER — APPOINTMENT (OUTPATIENT)
Dept: OPHTHALMOLOGY | Facility: CLINIC | Age: 82
End: 2022-12-06

## 2022-12-06 LAB
25(OH)D3 SERPL-MCNC: 13.3 NG/ML
CALCIUM SERPL-MCNC: 9 MG/DL
MAGNESIUM SERPL-MCNC: 2.2 MG/DL
PARATHYROID HORMONE INTACT: 120 PG/ML
PHOSPHATE SERPL-MCNC: 3.1 MG/DL
TSH SERPL-ACNC: 1.03 UIU/ML

## 2022-12-12 LAB
ALBUPE: 15.9 %
ALPHA1UPE: 31.8 %
ALPHA2UPE: 23.3 %
BETAUPE: 18.3 %
GAMMAUPE: 10.7 %
IGA 24H UR QL IFE: NORMAL
KAPPA LC 24H UR QL: NORMAL
PROT PATTERN 24H UR ELPH-IMP: NORMAL
PROT UR-MCNC: 12 MG/DL
PROT UR-MCNC: 12 MG/DL

## 2022-12-13 ENCOUNTER — RX RENEWAL (OUTPATIENT)
Age: 82
End: 2022-12-13

## 2022-12-13 ENCOUNTER — APPOINTMENT (OUTPATIENT)
Dept: OPHTHALMOLOGY | Facility: CLINIC | Age: 82
End: 2022-12-13

## 2022-12-13 ENCOUNTER — NON-APPOINTMENT (OUTPATIENT)
Age: 82
End: 2022-12-13

## 2022-12-13 LAB
A/G RATIO: 1.3
ALBUMIN: 3.5 G/DL
ALPHA-1-GLOBULIN: 0.2 G/DL
ALPHA-2-GLOBULIN: 0.7 G/DL
BETA GLOBULIN: 0.9 G/DL
GAMMA GLOBULIN: 0.9 G/DL
GLOBULIN SER CALC-MCNC: 2.7 G/DL
IFE REFLEX: NORMAL
Lab: NORMAL
M-SPIKE: NORMAL G/DL
PROTEIN, TOTAL: 6.2 G/DL

## 2022-12-13 PROCEDURE — 99024 POSTOP FOLLOW-UP VISIT: CPT

## 2022-12-19 NOTE — PHYSICAL EXAM
[Alert] : alert [Healthy Appearance] : healthy appearance [No Acute Distress] : no acute distress [Normal Sclera/Conjunctiva] : normal sclera/conjunctiva [Normal Hearing] : hearing was normal [No Neck Mass] : no neck mass was observed [No LAD] : no lymphadenopathy [Supple] : the neck was supple [Thyroid Not Enlarged] : the thyroid was not enlarged [No Respiratory Distress] : no respiratory distress [Clear to Auscultation] : lungs were clear to auscultation bilaterally [Normal S1, S2] : normal S1 and S2 [Normal Rate] : heart rate was normal [Regular Rhythm] : with a regular rhythm [No Spinal Tenderness] : no spinal tenderness [No Stigmata of Cushings Syndrome] : no stigmata of Cushings Syndrome [Normal Gait] : normal gait [Kyphosis] : no kyphosis present [Scoliosis] : no scoliosis [Acanthosis Nigricans] : no acanthosis nigricans [de-identified] : no moon facies, no supraclavicular fat pads [de-identified] : + difficulty balancing on one leg bilaterally

## 2022-12-19 NOTE — HISTORY OF PRESENT ILLNESS
[FreeTextEntry1] : Ms. Gibbons is a 81 year-old woman presenting to establish care with me for osteoporosis.\par \par Bone History\par Osteoporosis diagnosed a few years ago; most recent bone density as below\par Fracture history: Right wrist fracture in the thirties in a fall from standing height; left humerus fracture in 2018 in a fall from standing height; morphometric L1 vertebral fracture noted on vertebral fracture assessment with her first bone density test (may have occurred in a fall from a horse)\par Family history: No parental history of hip fracture\par Treatment: Zoledronic acid 5 mg IV in 2018\par \par Falls: No\par Height loss: No\par Kidney stones: No\par Dental health: Regular appointments, history of implants, no upcoming procedures planned\par Exercise: Walks, plays ping pong twice a week\par Dairy intake: 0-2 servings daily (cheese a few times a week, occasional yogurt)\par Calcium supplements: None\par Multivitamin: None\par Vitamin D supplements: None\par \par Osteoporosis risk factors include: Postmenopausal status,  race, prior fracture, falls, height loss, small thin bones, tobacco use, excessive alcohol, anorexia, family history, vitamin D deficiency, corticosteroid use, seizure medications, malabsorption, hyperparathyroidism, hyperthyroidism.\par NEGATIVE EXCEPT: Postmenopausal status,  race, prior fracture

## 2022-12-19 NOTE — ASSESSMENT
[FreeTextEntry1] : Osteoporosis. She has a history of fragility fracture. She has a history of prior zoledronic acid therapy in 2018. We discussed completion of a metabolic evaluation for secondary causes of bone loss. We discussed the potential benefits and risks of the osteoanabolic and antiresorptive classes of pharmacologic osteoporosis therapy. Since she has a prior history of fracture and a bone density T-score more than 3.5 standard deviations below normal, we focused on the osteoanabolic class. We discussed the potential benefits and risks of romosozumab, including but not limited to osteonecrosis of the jaw, atypical femoral fracture, cardiovascular risk including heart attack and stroke. She is hesitant to consider PTH/PTHrP analog therapy due to the need for self injection. We also discussed the potential benefits and risks of antiresorptive osteoporosis therapy, including but not limited to osteonecrosis of the jaw and atypical femoral fracture. We discussed that my first recommendation would be for romosozumab therapy, with denosumab as a second choice. She will consider her options for pharmacologic osteoporosis therapy pending further evaluation. \par Metabolic evaluation for secondary causes of osteoporosis\par Calcium 6572-0039 mg daily from diet and supplements (to be taken in divided doses as no more than 500-600 mg can be absorbed at one time); advised increased dietary and/or supplemental calcium\par Vitamin D at least 800 intl units daily or more pending level\par Diet, exercise and fall prevention discussed\par Physical therapy for balance and bone health\par \par I reviewed the DXA performed on October 20, 2022 with the patient today.\par I reviewed the laboratories performed on October 14, 2022 with the patient today. \par I counseled the patient regarding calcium and vitamin D intake today.\par I discussed the following osteoporosis therapies: Alendronate, risedronate, ibandronate, zoledronic acid, denosumab, teriparatide, abaloparatide, romosozumab

## 2022-12-19 NOTE — ADDENDUM
[FreeTextEntry1] : Recent laboratory results as below. PTH elevated likely secondary to vitamin D deficiency. I recommend ergocalciferol 50,000 intl units weekly for three months, then every two weeks. Serum protein electrophoresis with faint band in gamma region; reflex immunofixation unremarkable. Urine protein electrophoresis without monoclonal protein. Other test results within range. I left a telephone message for Ms. Gibbons for call back and will send a letter with results. 12/12/22\par \par I spoke with Ms. Gibbons about her results above. She wants to proceed with romosozumab. 12/19/22

## 2022-12-22 ENCOUNTER — NON-APPOINTMENT (OUTPATIENT)
Age: 82
End: 2022-12-22

## 2023-01-30 ENCOUNTER — APPOINTMENT (OUTPATIENT)
Dept: INFUSION THERAPY | Facility: CLINIC | Age: 83
End: 2023-01-30

## 2023-01-30 ENCOUNTER — OUTPATIENT (OUTPATIENT)
Dept: OUTPATIENT SERVICES | Facility: HOSPITAL | Age: 83
LOS: 1 days | End: 2023-01-30
Payer: MEDICARE

## 2023-01-30 VITALS
HEART RATE: 60 BPM | RESPIRATION RATE: 16 BRPM | OXYGEN SATURATION: 98 % | TEMPERATURE: 99 F | SYSTOLIC BLOOD PRESSURE: 139 MMHG | DIASTOLIC BLOOD PRESSURE: 73 MMHG

## 2023-01-30 DIAGNOSIS — Z98.890 OTHER SPECIFIED POSTPROCEDURAL STATES: Chronic | ICD-10-CM

## 2023-01-30 DIAGNOSIS — Z86.018 PERSONAL HISTORY OF OTHER BENIGN NEOPLASM: Chronic | ICD-10-CM

## 2023-01-30 DIAGNOSIS — Z98.41 CATARACT EXTRACTION STATUS, RIGHT EYE: Chronic | ICD-10-CM

## 2023-01-30 DIAGNOSIS — M81.0 AGE-RELATED OSTEOPOROSIS WITHOUT CURRENT PATHOLOGICAL FRACTURE: ICD-10-CM

## 2023-01-30 PROCEDURE — 96372 THER/PROPH/DIAG INJ SC/IM: CPT

## 2023-01-30 RX ORDER — ROMOSOZUMAB-AQQG 105 MG/1.17ML
105 INJECTION, SOLUTION SUBCUTANEOUS
Refills: 0 | Status: COMPLETED | OUTPATIENT
Start: 2023-01-30 | End: 2023-01-30

## 2023-01-30 RX ADMIN — ROMOSOZUMAB-AQQG 105 MILLIGRAM(S): 105 INJECTION, SOLUTION SUBCUTANEOUS at 15:55

## 2023-01-30 RX ADMIN — ROMOSOZUMAB-AQQG 105 MILLIGRAM(S): 105 INJECTION, SOLUTION SUBCUTANEOUS at 15:56

## 2023-02-27 ENCOUNTER — OUTPATIENT (OUTPATIENT)
Dept: OUTPATIENT SERVICES | Facility: HOSPITAL | Age: 83
LOS: 1 days | End: 2023-02-27
Payer: MEDICARE

## 2023-02-27 ENCOUNTER — APPOINTMENT (OUTPATIENT)
Dept: INFUSION THERAPY | Facility: CLINIC | Age: 83
End: 2023-02-27

## 2023-02-27 VITALS
RESPIRATION RATE: 18 BRPM | TEMPERATURE: 99 F | DIASTOLIC BLOOD PRESSURE: 69 MMHG | SYSTOLIC BLOOD PRESSURE: 111 MMHG | OXYGEN SATURATION: 98 % | HEART RATE: 70 BPM

## 2023-02-27 DIAGNOSIS — M81.0 AGE-RELATED OSTEOPOROSIS WITHOUT CURRENT PATHOLOGICAL FRACTURE: ICD-10-CM

## 2023-02-27 DIAGNOSIS — Z86.018 PERSONAL HISTORY OF OTHER BENIGN NEOPLASM: Chronic | ICD-10-CM

## 2023-02-27 DIAGNOSIS — Z98.890 OTHER SPECIFIED POSTPROCEDURAL STATES: Chronic | ICD-10-CM

## 2023-02-27 DIAGNOSIS — Z98.41 CATARACT EXTRACTION STATUS, RIGHT EYE: Chronic | ICD-10-CM

## 2023-02-27 PROCEDURE — 96372 THER/PROPH/DIAG INJ SC/IM: CPT

## 2023-02-27 RX ORDER — ROMOSOZUMAB-AQQG 105 MG/1.17ML
105 INJECTION, SOLUTION SUBCUTANEOUS
Refills: 0 | Status: COMPLETED | OUTPATIENT
Start: 2023-02-27 | End: 2023-02-27

## 2023-02-27 RX ADMIN — ROMOSOZUMAB-AQQG 105 MILLIGRAM(S): 105 INJECTION, SOLUTION SUBCUTANEOUS at 16:04

## 2023-02-27 RX ADMIN — ROMOSOZUMAB-AQQG 105 MILLIGRAM(S): 105 INJECTION, SOLUTION SUBCUTANEOUS at 16:05

## 2023-03-06 NOTE — H&P ADULT. - GASTROINTESTINAL
negative not examined pt presents with daughter c/o LLE swelling and pain for 1 months worsening today. hx of 3 stents in the left leg and 2 cardiac stents insertion point R groin. BL LE +2 edema present. cool to the touch on both legs. dopplerable DP  pulses and sensation. discoloration of foot noted. pt states he traveled from [facility] 1 week ago. received angiogram while in [facility] to which further treatment was needed. pt returned to [place] and came to ER for treatment. pt denies chest pain, sob. family contacted dr Maldonado and was told to come in for admisison. pt safety maintained

## 2023-03-27 ENCOUNTER — APPOINTMENT (OUTPATIENT)
Dept: INFUSION THERAPY | Facility: CLINIC | Age: 83
End: 2023-03-27

## 2023-03-27 ENCOUNTER — OUTPATIENT (OUTPATIENT)
Dept: OUTPATIENT SERVICES | Facility: HOSPITAL | Age: 83
LOS: 1 days | End: 2023-03-27
Payer: MEDICARE

## 2023-03-27 VITALS
DIASTOLIC BLOOD PRESSURE: 72 MMHG | RESPIRATION RATE: 18 BRPM | OXYGEN SATURATION: 95 % | TEMPERATURE: 99 F | SYSTOLIC BLOOD PRESSURE: 113 MMHG | HEART RATE: 53 BPM

## 2023-03-27 DIAGNOSIS — Z98.890 OTHER SPECIFIED POSTPROCEDURAL STATES: Chronic | ICD-10-CM

## 2023-03-27 DIAGNOSIS — Z86.018 PERSONAL HISTORY OF OTHER BENIGN NEOPLASM: Chronic | ICD-10-CM

## 2023-03-27 DIAGNOSIS — M81.0 AGE-RELATED OSTEOPOROSIS WITHOUT CURRENT PATHOLOGICAL FRACTURE: ICD-10-CM

## 2023-03-27 DIAGNOSIS — Z98.41 CATARACT EXTRACTION STATUS, RIGHT EYE: Chronic | ICD-10-CM

## 2023-03-27 PROCEDURE — 96372 THER/PROPH/DIAG INJ SC/IM: CPT

## 2023-03-27 RX ORDER — ROMOSOZUMAB-AQQG 105 MG/1.17ML
105 INJECTION, SOLUTION SUBCUTANEOUS
Refills: 0 | Status: COMPLETED | OUTPATIENT
Start: 2023-03-27 | End: 2023-03-27

## 2023-03-27 RX ADMIN — ROMOSOZUMAB-AQQG 105 MILLIGRAM(S): 105 INJECTION, SOLUTION SUBCUTANEOUS at 16:35

## 2023-03-27 RX ADMIN — ROMOSOZUMAB-AQQG 105 MILLIGRAM(S): 105 INJECTION, SOLUTION SUBCUTANEOUS at 16:36

## 2023-04-24 ENCOUNTER — APPOINTMENT (OUTPATIENT)
Dept: INFUSION THERAPY | Facility: CLINIC | Age: 83
End: 2023-04-24

## 2023-04-24 ENCOUNTER — OUTPATIENT (OUTPATIENT)
Dept: OUTPATIENT SERVICES | Facility: HOSPITAL | Age: 83
LOS: 1 days | End: 2023-04-24
Payer: MEDICARE

## 2023-04-24 VITALS
TEMPERATURE: 98 F | RESPIRATION RATE: 18 BRPM | OXYGEN SATURATION: 99 % | DIASTOLIC BLOOD PRESSURE: 74 MMHG | SYSTOLIC BLOOD PRESSURE: 112 MMHG | HEART RATE: 70 BPM

## 2023-04-24 DIAGNOSIS — M81.0 AGE-RELATED OSTEOPOROSIS WITHOUT CURRENT PATHOLOGICAL FRACTURE: ICD-10-CM

## 2023-04-24 DIAGNOSIS — Z98.890 OTHER SPECIFIED POSTPROCEDURAL STATES: Chronic | ICD-10-CM

## 2023-04-24 DIAGNOSIS — Z86.018 PERSONAL HISTORY OF OTHER BENIGN NEOPLASM: Chronic | ICD-10-CM

## 2023-04-24 DIAGNOSIS — Z98.41 CATARACT EXTRACTION STATUS, RIGHT EYE: Chronic | ICD-10-CM

## 2023-04-24 PROCEDURE — 96372 THER/PROPH/DIAG INJ SC/IM: CPT

## 2023-04-24 RX ORDER — ROMOSOZUMAB-AQQG 105 MG/1.17ML
105 INJECTION, SOLUTION SUBCUTANEOUS
Refills: 0 | Status: COMPLETED | OUTPATIENT
Start: 2023-04-24 | End: 2023-04-24

## 2023-04-24 RX ADMIN — ROMOSOZUMAB-AQQG 105 MILLIGRAM(S): 105 INJECTION, SOLUTION SUBCUTANEOUS at 16:00

## 2023-04-24 RX ADMIN — ROMOSOZUMAB-AQQG 105 MILLIGRAM(S): 105 INJECTION, SOLUTION SUBCUTANEOUS at 16:01

## 2023-05-22 ENCOUNTER — OUTPATIENT (OUTPATIENT)
Dept: OUTPATIENT SERVICES | Facility: HOSPITAL | Age: 83
LOS: 1 days | End: 2023-05-22
Payer: MEDICARE

## 2023-05-22 ENCOUNTER — APPOINTMENT (OUTPATIENT)
Dept: INFUSION THERAPY | Facility: CLINIC | Age: 83
End: 2023-05-22

## 2023-05-22 ENCOUNTER — RX RENEWAL (OUTPATIENT)
Age: 83
End: 2023-05-22

## 2023-05-22 VITALS
HEIGHT: 64 IN | HEART RATE: 98 BPM | WEIGHT: 154.98 LBS | TEMPERATURE: 98 F | RESPIRATION RATE: 17 BRPM | SYSTOLIC BLOOD PRESSURE: 127 MMHG | OXYGEN SATURATION: 96 % | DIASTOLIC BLOOD PRESSURE: 70 MMHG

## 2023-05-22 DIAGNOSIS — Z86.018 PERSONAL HISTORY OF OTHER BENIGN NEOPLASM: Chronic | ICD-10-CM

## 2023-05-22 DIAGNOSIS — Z98.890 OTHER SPECIFIED POSTPROCEDURAL STATES: Chronic | ICD-10-CM

## 2023-05-22 DIAGNOSIS — Z98.41 CATARACT EXTRACTION STATUS, RIGHT EYE: Chronic | ICD-10-CM

## 2023-05-22 DIAGNOSIS — M81.0 AGE-RELATED OSTEOPOROSIS WITHOUT CURRENT PATHOLOGICAL FRACTURE: ICD-10-CM

## 2023-05-22 PROCEDURE — 96372 THER/PROPH/DIAG INJ SC/IM: CPT

## 2023-05-22 RX ORDER — ROMOSOZUMAB-AQQG 105 MG/1.17ML
105 INJECTION, SOLUTION SUBCUTANEOUS
Refills: 0 | Status: COMPLETED | OUTPATIENT
Start: 2023-05-22 | End: 2023-05-22

## 2023-05-22 RX ADMIN — ROMOSOZUMAB-AQQG 105 MILLIGRAM(S): 105 INJECTION, SOLUTION SUBCUTANEOUS at 16:19

## 2023-05-22 RX ADMIN — ROMOSOZUMAB-AQQG 105 MILLIGRAM(S): 105 INJECTION, SOLUTION SUBCUTANEOUS at 16:20

## 2023-06-19 ENCOUNTER — OUTPATIENT (OUTPATIENT)
Dept: OUTPATIENT SERVICES | Facility: HOSPITAL | Age: 83
LOS: 1 days | End: 2023-06-19
Payer: MEDICARE

## 2023-06-19 ENCOUNTER — RX RENEWAL (OUTPATIENT)
Age: 83
End: 2023-06-19

## 2023-06-19 ENCOUNTER — APPOINTMENT (OUTPATIENT)
Dept: INFUSION THERAPY | Facility: CLINIC | Age: 83
End: 2023-06-19

## 2023-06-19 VITALS
TEMPERATURE: 98 F | SYSTOLIC BLOOD PRESSURE: 136 MMHG | RESPIRATION RATE: 16 BRPM | DIASTOLIC BLOOD PRESSURE: 34 MMHG | OXYGEN SATURATION: 97 % | HEART RATE: 60 BPM

## 2023-06-19 DIAGNOSIS — M81.0 AGE-RELATED OSTEOPOROSIS WITHOUT CURRENT PATHOLOGICAL FRACTURE: ICD-10-CM

## 2023-06-19 DIAGNOSIS — Z98.41 CATARACT EXTRACTION STATUS, RIGHT EYE: Chronic | ICD-10-CM

## 2023-06-19 DIAGNOSIS — Z98.890 OTHER SPECIFIED POSTPROCEDURAL STATES: Chronic | ICD-10-CM

## 2023-06-19 DIAGNOSIS — Z86.018 PERSONAL HISTORY OF OTHER BENIGN NEOPLASM: Chronic | ICD-10-CM

## 2023-06-19 PROCEDURE — 96372 THER/PROPH/DIAG INJ SC/IM: CPT

## 2023-06-19 RX ORDER — ROMOSOZUMAB-AQQG 105 MG/1.17ML
105 INJECTION, SOLUTION SUBCUTANEOUS
Refills: 0 | Status: COMPLETED | OUTPATIENT
Start: 2023-06-19 | End: 2023-06-19

## 2023-06-19 RX ADMIN — ROMOSOZUMAB-AQQG 105 MILLIGRAM(S): 105 INJECTION, SOLUTION SUBCUTANEOUS at 16:50

## 2023-06-19 RX ADMIN — ROMOSOZUMAB-AQQG 105 MILLIGRAM(S): 105 INJECTION, SOLUTION SUBCUTANEOUS at 16:49

## 2023-06-23 ENCOUNTER — RX RENEWAL (OUTPATIENT)
Age: 83
End: 2023-06-23

## 2023-07-13 ENCOUNTER — APPOINTMENT (OUTPATIENT)
Dept: ENDOCRINOLOGY | Facility: CLINIC | Age: 83
End: 2023-07-13
Payer: MEDICARE

## 2023-07-13 VITALS
HEIGHT: 64 IN | BODY MASS INDEX: 26.63 KG/M2 | WEIGHT: 156 LBS | HEART RATE: 68 BPM | SYSTOLIC BLOOD PRESSURE: 139 MMHG | DIASTOLIC BLOOD PRESSURE: 73 MMHG

## 2023-07-13 PROCEDURE — 99214 OFFICE O/P EST MOD 30 MIN: CPT

## 2023-07-13 RX ORDER — CALCIUM CITRATE/VITAMIN D3 315MG-6.25
TABLET ORAL
Refills: 0 | Status: ACTIVE | COMMUNITY

## 2023-07-13 RX ORDER — KETOROLAC TROMETHAMINE 5 MG/ML
0.5 SOLUTION OPHTHALMIC
Qty: 10 | Refills: 0 | Status: DISCONTINUED | COMMUNITY
Start: 2022-10-20 | End: 2023-07-13

## 2023-07-13 RX ORDER — ERGOCALCIFEROL 1.25 MG/1
1.25 MG CAPSULE ORAL
Qty: 6 | Refills: 1 | Status: DISCONTINUED | COMMUNITY
Start: 2022-12-12 | End: 2023-07-13

## 2023-07-13 RX ORDER — OFLOXACIN 3 MG/ML
0.3 SOLUTION/ DROPS OPHTHALMIC
Qty: 10 | Refills: 0 | Status: DISCONTINUED | COMMUNITY
Start: 2022-10-20 | End: 2023-07-13

## 2023-07-13 RX ORDER — PREDNISOLONE ACETATE 10 MG/ML
1 SUSPENSION/ DROPS OPHTHALMIC
Qty: 15 | Refills: 0 | Status: DISCONTINUED | COMMUNITY
Start: 2022-11-28 | End: 2023-07-13

## 2023-07-14 ENCOUNTER — RX RENEWAL (OUTPATIENT)
Age: 83
End: 2023-07-14

## 2023-07-14 LAB
25(OH)D3 SERPL-MCNC: 45 NG/ML
ALBUMIN SERPL ELPH-MCNC: 4.2 G/DL
ALP BLD-CCNC: 129 U/L
ALT SERPL-CCNC: 31 U/L
ANION GAP SERPL CALC-SCNC: 12 MMOL/L
AST SERPL-CCNC: 27 U/L
BILIRUB SERPL-MCNC: 0.2 MG/DL
BUN SERPL-MCNC: 26 MG/DL
CALCIUM SERPL-MCNC: 9.2 MG/DL
CALCIUM SERPL-MCNC: 9.2 MG/DL
CHLORIDE SERPL-SCNC: 100 MMOL/L
CO2 SERPL-SCNC: 27 MMOL/L
CREAT SERPL-MCNC: 0.8 MG/DL
EGFR: 74 ML/MIN/1.73M2
GLUCOSE SERPL-MCNC: 96 MG/DL
MAGNESIUM SERPL-MCNC: 2.2 MG/DL
PARATHYROID HORMONE INTACT: 126 PG/ML
PHOSPHATE SERPL-MCNC: 3.1 MG/DL
POTASSIUM SERPL-SCNC: 4.6 MMOL/L
PROT SERPL-MCNC: 6.2 G/DL
SODIUM SERPL-SCNC: 140 MMOL/L

## 2023-07-17 ENCOUNTER — OUTPATIENT (OUTPATIENT)
Dept: OUTPATIENT SERVICES | Facility: HOSPITAL | Age: 83
LOS: 1 days | End: 2023-07-17
Payer: MEDICARE

## 2023-07-17 ENCOUNTER — APPOINTMENT (OUTPATIENT)
Dept: INFUSION THERAPY | Facility: CLINIC | Age: 83
End: 2023-07-17

## 2023-07-17 VITALS
HEART RATE: 74 BPM | OXYGEN SATURATION: 100 % | SYSTOLIC BLOOD PRESSURE: 119 MMHG | DIASTOLIC BLOOD PRESSURE: 58 MMHG | RESPIRATION RATE: 17 BRPM | HEIGHT: 64 IN | WEIGHT: 154.98 LBS | TEMPERATURE: 98 F

## 2023-07-17 DIAGNOSIS — Z98.890 OTHER SPECIFIED POSTPROCEDURAL STATES: Chronic | ICD-10-CM

## 2023-07-17 DIAGNOSIS — Z98.41 CATARACT EXTRACTION STATUS, RIGHT EYE: Chronic | ICD-10-CM

## 2023-07-17 DIAGNOSIS — Z86.018 PERSONAL HISTORY OF OTHER BENIGN NEOPLASM: Chronic | ICD-10-CM

## 2023-07-17 DIAGNOSIS — M81.0 AGE-RELATED OSTEOPOROSIS WITHOUT CURRENT PATHOLOGICAL FRACTURE: ICD-10-CM

## 2023-07-17 PROCEDURE — 96372 THER/PROPH/DIAG INJ SC/IM: CPT

## 2023-07-17 RX ORDER — ROMOSOZUMAB-AQQG 105 MG/1.17ML
105 INJECTION, SOLUTION SUBCUTANEOUS
Refills: 0 | Status: COMPLETED | OUTPATIENT
Start: 2023-07-17 | End: 2023-07-17

## 2023-07-17 RX ADMIN — ROMOSOZUMAB-AQQG 105 MILLIGRAM(S): 105 INJECTION, SOLUTION SUBCUTANEOUS at 16:41

## 2023-07-17 RX ADMIN — ROMOSOZUMAB-AQQG 105 MILLIGRAM(S): 105 INJECTION, SOLUTION SUBCUTANEOUS at 16:42

## 2023-07-20 LAB — ALP BONE SERPL-MCNC: 34.4 UG/L

## 2023-08-03 ENCOUNTER — RX RENEWAL (OUTPATIENT)
Age: 83
End: 2023-08-03

## 2023-08-03 ENCOUNTER — LABORATORY RESULT (OUTPATIENT)
Age: 83
End: 2023-08-03

## 2023-08-03 ENCOUNTER — NON-APPOINTMENT (OUTPATIENT)
Age: 83
End: 2023-08-03

## 2023-08-03 ENCOUNTER — APPOINTMENT (OUTPATIENT)
Dept: INTERNAL MEDICINE | Facility: CLINIC | Age: 83
End: 2023-08-03
Payer: MEDICARE

## 2023-08-03 VITALS
SYSTOLIC BLOOD PRESSURE: 106 MMHG | WEIGHT: 156.38 LBS | DIASTOLIC BLOOD PRESSURE: 65 MMHG | HEART RATE: 75 BPM | OXYGEN SATURATION: 96 % | HEIGHT: 63 IN | TEMPERATURE: 97.2 F | BODY MASS INDEX: 27.71 KG/M2 | RESPIRATION RATE: 18 BRPM

## 2023-08-03 DIAGNOSIS — Z00.00 ENCOUNTER FOR GENERAL ADULT MEDICAL EXAMINATION W/OUT ABNORMAL FINDINGS: ICD-10-CM

## 2023-08-03 DIAGNOSIS — I10 ESSENTIAL (PRIMARY) HYPERTENSION: ICD-10-CM

## 2023-08-03 DIAGNOSIS — E66.9 OBESITY, UNSPECIFIED: ICD-10-CM

## 2023-08-03 PROCEDURE — G0439: CPT

## 2023-08-03 PROCEDURE — 36415 COLL VENOUS BLD VENIPUNCTURE: CPT

## 2023-08-03 NOTE — HEALTH RISK ASSESSMENT
[Very Good] : ~his/her~  mood as very good [Yes] : Yes [4 or more  times a week (4 pts)] : 4 or more  times a week (4 points) [1 or 2 (0 pts)] : 1 or 2 (0 points) [Never (0 pts)] : Never (0 points) [0] : 2) Feeling down, depressed, or hopeless: Not at all (0) [Patient reported bone density results were abnormal] : Patient reported bone density results were abnormal [Alone] : lives alone [Fully functional (bathing, dressing, toileting, transferring, walking, feeding)] : Fully functional (bathing, dressing, toileting, transferring, walking, feeding) [Fully functional (using the telephone, shopping, preparing meals, housekeeping, doing laundry, using] : Fully functional and needs no help or supervision to perform IADLs (using the telephone, shopping, preparing meals, housekeeping, doing laundry, using transportation, managing medications and managing finances) [Never] : Never [BoneDensityDate] : 2022

## 2023-08-03 NOTE — PHYSICAL EXAM
[Normal Sclera/Conjunctiva] : normal sclera/conjunctiva [EOMI] : extraocular movements intact [Supple] : supple [Normal Rate] : normal rate  [Regular Rhythm] : with a regular rhythm [Normal S1, S2] : normal S1 and S2 [Soft] : abdomen soft [Non Tender] : non-tender [Non-distended] : non-distended [Normal Bowel Sounds] : normal bowel sounds [Grossly Normal Strength/Tone] : grossly normal strength/tone [Coordination Grossly Intact] : coordination grossly intact [Normal Gait] : normal gait [Normal] : affect was normal and insight and judgment were intact [No Edema] : there was no peripheral edema

## 2023-08-03 NOTE — HISTORY OF PRESENT ILLNESS
[FreeTextEntry1] : annual  [de-identified] : 83 yo f here for annual   Concerned with weight loss - would like to lose 15 lbs  States failed diet and exercise & unable to lose more than 3 lbs  Would like ozempic   2 shots a month for osteo with [facility]  Plays ping pong 3 times a week  Denies cp, sob, and palpitations. Reports pain in both knees; left knee greater  Pain with movement Would like to see an orthopedist  Walking with cane   Reports normal diet and low appetite  Eats about 2 meals a day   Reports increase urinary frequency  FU with urogyn - given some pills; can't recall name but no longer takes  Reports regular BM   Denies any acute complaints.

## 2023-08-09 ENCOUNTER — NON-APPOINTMENT (OUTPATIENT)
Age: 83
End: 2023-08-09

## 2023-08-09 LAB
ALBUMIN SERPL ELPH-MCNC: 3.9 G/DL
ALP BLD-CCNC: 113 U/L
ALT SERPL-CCNC: 30 U/L
ANION GAP SERPL CALC-SCNC: 13 MMOL/L
APPEARANCE: CLEAR
AST SERPL-CCNC: 28 U/L
BILIRUB SERPL-MCNC: 0.3 MG/DL
BILIRUBIN URINE: NEGATIVE
BLOOD URINE: NEGATIVE
BUN SERPL-MCNC: 33 MG/DL
CALCIUM SERPL-MCNC: 8.3 MG/DL
CHLORIDE SERPL-SCNC: 102 MMOL/L
CHOLEST SERPL-MCNC: 144 MG/DL
CO2 SERPL-SCNC: 25 MMOL/L
COLOR: YELLOW
CREAT SERPL-MCNC: 0.95 MG/DL
EGFR: 61 ML/MIN/1.73M2
ESTIMATED AVERAGE GLUCOSE: 123 MG/DL
GLUCOSE QUALITATIVE U: NEGATIVE MG/DL
GLUCOSE SERPL-MCNC: 96 MG/DL
HBA1C MFR BLD HPLC: 5.9 %
HDLC SERPL-MCNC: 88 MG/DL
KETONES URINE: ABNORMAL MG/DL
LDLC SERPL CALC-MCNC: 46 MG/DL
LEUKOCYTE ESTERASE URINE: ABNORMAL
NITRITE URINE: NEGATIVE
NONHDLC SERPL-MCNC: 56 MG/DL
PH URINE: 6.5
POTASSIUM SERPL-SCNC: 3.8 MMOL/L
PROT SERPL-MCNC: 5.9 G/DL
PROTEIN URINE: NORMAL MG/DL
SODIUM SERPL-SCNC: 139 MMOL/L
SPECIFIC GRAVITY URINE: 1.03
T4 FREE SERPL-MCNC: 1.2 NG/DL
TRIGL SERPL-MCNC: 44 MG/DL
TSH SERPL-ACNC: 0.91 UIU/ML
UROBILINOGEN URINE: 1 MG/DL

## 2023-08-14 ENCOUNTER — APPOINTMENT (OUTPATIENT)
Dept: INFUSION THERAPY | Facility: CLINIC | Age: 83
End: 2023-08-14

## 2023-08-14 ENCOUNTER — OUTPATIENT (OUTPATIENT)
Dept: OUTPATIENT SERVICES | Facility: HOSPITAL | Age: 83
LOS: 1 days | End: 2023-08-14
Payer: MEDICARE

## 2023-08-14 VITALS
HEART RATE: 64 BPM | RESPIRATION RATE: 18 BRPM | OXYGEN SATURATION: 96 % | SYSTOLIC BLOOD PRESSURE: 98 MMHG | DIASTOLIC BLOOD PRESSURE: 68 MMHG

## 2023-08-14 DIAGNOSIS — Z98.41 CATARACT EXTRACTION STATUS, RIGHT EYE: Chronic | ICD-10-CM

## 2023-08-14 DIAGNOSIS — Z86.018 PERSONAL HISTORY OF OTHER BENIGN NEOPLASM: Chronic | ICD-10-CM

## 2023-08-14 DIAGNOSIS — Z98.890 OTHER SPECIFIED POSTPROCEDURAL STATES: Chronic | ICD-10-CM

## 2023-08-14 DIAGNOSIS — M81.0 AGE-RELATED OSTEOPOROSIS WITHOUT CURRENT PATHOLOGICAL FRACTURE: ICD-10-CM

## 2023-08-14 PROCEDURE — 96372 THER/PROPH/DIAG INJ SC/IM: CPT

## 2023-08-14 RX ORDER — ROMOSOZUMAB-AQQG 105 MG/1.17ML
105 INJECTION, SOLUTION SUBCUTANEOUS
Refills: 0 | Status: COMPLETED | OUTPATIENT
Start: 2023-08-14 | End: 2023-08-14

## 2023-08-14 RX ADMIN — ROMOSOZUMAB-AQQG 105 MILLIGRAM(S): 105 INJECTION, SOLUTION SUBCUTANEOUS at 16:48

## 2023-08-14 RX ADMIN — ROMOSOZUMAB-AQQG 105 MILLIGRAM(S): 105 INJECTION, SOLUTION SUBCUTANEOUS at 16:47

## 2023-08-31 ENCOUNTER — RX RENEWAL (OUTPATIENT)
Age: 83
End: 2023-08-31

## 2023-08-31 DIAGNOSIS — B30.9 VIRAL CONJUNCTIVITIS, UNSPECIFIED: ICD-10-CM

## 2023-08-31 RX ORDER — TOBRAMYCIN AND DEXAMETHASONE 3; 1 MG/ML; MG/ML
0.3-0.1 SUSPENSION/ DROPS OPHTHALMIC 4 TIMES DAILY
Qty: 1 | Refills: 0 | Status: ACTIVE | COMMUNITY
Start: 2023-08-31 | End: 1900-01-01

## 2023-09-11 ENCOUNTER — OUTPATIENT (OUTPATIENT)
Dept: OUTPATIENT SERVICES | Facility: HOSPITAL | Age: 83
LOS: 1 days | End: 2023-09-11
Payer: MEDICARE

## 2023-09-11 ENCOUNTER — APPOINTMENT (OUTPATIENT)
Dept: INFUSION THERAPY | Facility: CLINIC | Age: 83
End: 2023-09-11

## 2023-09-11 VITALS
OXYGEN SATURATION: 96 % | TEMPERATURE: 98 F | SYSTOLIC BLOOD PRESSURE: 111 MMHG | WEIGHT: 154.98 LBS | HEIGHT: 64 IN | DIASTOLIC BLOOD PRESSURE: 68 MMHG | RESPIRATION RATE: 16 BRPM | HEART RATE: 52 BPM

## 2023-09-11 DIAGNOSIS — Z98.890 OTHER SPECIFIED POSTPROCEDURAL STATES: Chronic | ICD-10-CM

## 2023-09-11 DIAGNOSIS — Z86.018 PERSONAL HISTORY OF OTHER BENIGN NEOPLASM: Chronic | ICD-10-CM

## 2023-09-11 DIAGNOSIS — Z98.41 CATARACT EXTRACTION STATUS, RIGHT EYE: Chronic | ICD-10-CM

## 2023-09-11 DIAGNOSIS — M81.0 AGE-RELATED OSTEOPOROSIS WITHOUT CURRENT PATHOLOGICAL FRACTURE: ICD-10-CM

## 2023-09-11 PROCEDURE — 96372 THER/PROPH/DIAG INJ SC/IM: CPT

## 2023-09-11 RX ORDER — ROMOSOZUMAB-AQQG 105 MG/1.17ML
105 INJECTION, SOLUTION SUBCUTANEOUS
Refills: 0 | Status: COMPLETED | OUTPATIENT
Start: 2023-09-11 | End: 2023-09-11

## 2023-09-11 RX ADMIN — ROMOSOZUMAB-AQQG 105 MILLIGRAM(S): 105 INJECTION, SOLUTION SUBCUTANEOUS at 17:09

## 2023-09-11 RX ADMIN — ROMOSOZUMAB-AQQG 105 MILLIGRAM(S): 105 INJECTION, SOLUTION SUBCUTANEOUS at 17:10

## 2023-09-11 NOTE — PHARMACY COMMUNICATION NOTE - COMMENTS
S/w Dr. Fernández regarding calcium level from 8/4/2023 of 8.3mg/dL (corrected 8.38mg/dL). Per Dr. Fernández, OK to treat with Evenity today. Dr. Fernández will schedule patient for blood work next week and ensure adequate calcium and Vitamin D intake throughout therapy.

## 2023-09-28 ENCOUNTER — RX RENEWAL (OUTPATIENT)
Age: 83
End: 2023-09-28

## 2023-10-09 ENCOUNTER — APPOINTMENT (OUTPATIENT)
Dept: INFUSION THERAPY | Facility: CLINIC | Age: 83
End: 2023-10-09

## 2023-10-24 ENCOUNTER — OUTPATIENT (OUTPATIENT)
Dept: OUTPATIENT SERVICES | Facility: HOSPITAL | Age: 83
LOS: 1 days | End: 2023-10-24
Payer: MEDICARE

## 2023-10-24 ENCOUNTER — APPOINTMENT (OUTPATIENT)
Dept: INFUSION THERAPY | Facility: CLINIC | Age: 83
End: 2023-10-24

## 2023-10-24 VITALS
RESPIRATION RATE: 18 BRPM | DIASTOLIC BLOOD PRESSURE: 74 MMHG | HEART RATE: 55 BPM | TEMPERATURE: 98 F | OXYGEN SATURATION: 98 % | SYSTOLIC BLOOD PRESSURE: 127 MMHG

## 2023-10-24 DIAGNOSIS — Z98.890 OTHER SPECIFIED POSTPROCEDURAL STATES: Chronic | ICD-10-CM

## 2023-10-24 DIAGNOSIS — Z86.018 PERSONAL HISTORY OF OTHER BENIGN NEOPLASM: Chronic | ICD-10-CM

## 2023-10-24 DIAGNOSIS — M81.0 AGE-RELATED OSTEOPOROSIS WITHOUT CURRENT PATHOLOGICAL FRACTURE: ICD-10-CM

## 2023-10-24 DIAGNOSIS — Z98.41 CATARACT EXTRACTION STATUS, RIGHT EYE: Chronic | ICD-10-CM

## 2023-10-24 PROCEDURE — 96372 THER/PROPH/DIAG INJ SC/IM: CPT

## 2023-10-24 RX ORDER — ROMOSOZUMAB-AQQG 105 MG/1.17ML
105 INJECTION, SOLUTION SUBCUTANEOUS
Refills: 0 | Status: COMPLETED | OUTPATIENT
Start: 2023-10-24 | End: 2023-10-24

## 2023-10-24 RX ADMIN — ROMOSOZUMAB-AQQG 105 MILLIGRAM(S): 105 INJECTION, SOLUTION SUBCUTANEOUS at 15:33

## 2023-10-24 RX ADMIN — ROMOSOZUMAB-AQQG 105 MILLIGRAM(S): 105 INJECTION, SOLUTION SUBCUTANEOUS at 15:34

## 2023-10-26 ENCOUNTER — RX RENEWAL (OUTPATIENT)
Age: 83
End: 2023-10-26

## 2023-11-22 ENCOUNTER — APPOINTMENT (OUTPATIENT)
Dept: INFUSION THERAPY | Facility: CLINIC | Age: 83
End: 2023-11-22

## 2023-11-22 ENCOUNTER — OUTPATIENT (OUTPATIENT)
Dept: OUTPATIENT SERVICES | Facility: HOSPITAL | Age: 83
LOS: 1 days | End: 2023-11-22
Payer: MEDICARE

## 2023-11-22 VITALS
SYSTOLIC BLOOD PRESSURE: 114 MMHG | HEART RATE: 100 BPM | TEMPERATURE: 99 F | RESPIRATION RATE: 18 BRPM | DIASTOLIC BLOOD PRESSURE: 58 MMHG | OXYGEN SATURATION: 97 %

## 2023-11-22 DIAGNOSIS — Z86.018 PERSONAL HISTORY OF OTHER BENIGN NEOPLASM: Chronic | ICD-10-CM

## 2023-11-22 DIAGNOSIS — M81.0 AGE-RELATED OSTEOPOROSIS WITHOUT CURRENT PATHOLOGICAL FRACTURE: ICD-10-CM

## 2023-11-22 DIAGNOSIS — Z98.890 OTHER SPECIFIED POSTPROCEDURAL STATES: Chronic | ICD-10-CM

## 2023-11-22 DIAGNOSIS — Z98.41 CATARACT EXTRACTION STATUS, RIGHT EYE: Chronic | ICD-10-CM

## 2023-11-22 PROCEDURE — 96372 THER/PROPH/DIAG INJ SC/IM: CPT

## 2023-11-22 RX ORDER — ROMOSOZUMAB-AQQG 105 MG/1.17ML
105 INJECTION, SOLUTION SUBCUTANEOUS
Refills: 0 | Status: COMPLETED | OUTPATIENT
Start: 2023-11-22 | End: 2023-11-22

## 2023-11-22 RX ADMIN — ROMOSOZUMAB-AQQG 105 MILLIGRAM(S): 105 INJECTION, SOLUTION SUBCUTANEOUS at 14:50

## 2023-11-22 RX ADMIN — ROMOSOZUMAB-AQQG 105 MILLIGRAM(S): 105 INJECTION, SOLUTION SUBCUTANEOUS at 14:49

## 2023-11-22 NOTE — PHARMACY COMMUNICATION NOTE - COMMENTS
S/w Dr. Fernández regarding calcium level from 8/4/2023 of 8.3mg/dL (corrected 8.38mg/dL). Per Dr. Fernández, OK to treat with Evenity today. Per Dr. Fernández, she believes previously borderline low calcium was a laboratory anomaly, but she will schedule patient for blood work.

## 2023-11-23 ENCOUNTER — RX RENEWAL (OUTPATIENT)
Age: 83
End: 2023-11-23

## 2023-12-19 ENCOUNTER — OUTPATIENT (OUTPATIENT)
Dept: OUTPATIENT SERVICES | Facility: HOSPITAL | Age: 83
LOS: 1 days | End: 2023-12-19
Payer: MEDICARE

## 2023-12-19 ENCOUNTER — APPOINTMENT (OUTPATIENT)
Dept: INFUSION THERAPY | Facility: CLINIC | Age: 83
End: 2023-12-19

## 2023-12-19 VITALS
OXYGEN SATURATION: 96 % | RESPIRATION RATE: 18 BRPM | HEART RATE: 52 BPM | DIASTOLIC BLOOD PRESSURE: 63 MMHG | TEMPERATURE: 98 F | SYSTOLIC BLOOD PRESSURE: 136 MMHG

## 2023-12-19 DIAGNOSIS — Z86.018 PERSONAL HISTORY OF OTHER BENIGN NEOPLASM: Chronic | ICD-10-CM

## 2023-12-19 DIAGNOSIS — Z98.890 OTHER SPECIFIED POSTPROCEDURAL STATES: Chronic | ICD-10-CM

## 2023-12-19 DIAGNOSIS — Z98.41 CATARACT EXTRACTION STATUS, RIGHT EYE: Chronic | ICD-10-CM

## 2023-12-19 DIAGNOSIS — M81.0 AGE-RELATED OSTEOPOROSIS WITHOUT CURRENT PATHOLOGICAL FRACTURE: ICD-10-CM

## 2023-12-19 PROCEDURE — 96372 THER/PROPH/DIAG INJ SC/IM: CPT

## 2023-12-19 RX ORDER — ROMOSOZUMAB-AQQG 105 MG/1.17ML
105 INJECTION, SOLUTION SUBCUTANEOUS
Refills: 0 | Status: COMPLETED | OUTPATIENT
Start: 2023-12-19 | End: 2023-12-19

## 2023-12-19 RX ADMIN — ROMOSOZUMAB-AQQG 105 MILLIGRAM(S): 105 INJECTION, SOLUTION SUBCUTANEOUS at 16:24

## 2023-12-19 RX ADMIN — ROMOSOZUMAB-AQQG 105 MILLIGRAM(S): 105 INJECTION, SOLUTION SUBCUTANEOUS at 16:23

## 2023-12-21 ENCOUNTER — RX RENEWAL (OUTPATIENT)
Age: 83
End: 2023-12-21

## 2023-12-27 NOTE — CONSULT NOTE ADULT - NS ABD PE RECTAL EXAM
PHYSICAL THERAPY EVALUATION  Type of Visit: Evaluation    See electronic medical record for Abuse and Falls Screening details.    Subjective   KEY PT FINDINGS:  1) Minimal pain with examination  2) Poor core activation without support from mat  3) Normal hip range of motion  4) Good hip strength    Physical Therapy Initial Evaluation: Subjective History     Injury/Condition Details:  Presenting Complaint Left sided stress fracture   Onset Timing/Date 12/2023   Mechanism Did PT at [facility] (last visit was Summer 2023). Pilates on [employer]. Was able to get back to skating but the pain came back, 2-3 weeks on ice. Then went back to [place] and then went to [facility] with Dr. Ricks.   First visit was 12/2023 with them.   Occasional daily pain > bending like to tie shoes, it never lasts long.       Currently doing pilates on a weekly basis, tried to do biking but it caused more pain. Not doing any of the PT exercises.   Previous skating level: 5-6x/day week, 2-2.5 hours at a time.  No change in levels of skating when pain started. There was no instance that it started but also reports that it was not gradual.     Started skating at 6 years. 5-6 competitions/year, 2 shows/year.   Skates year rounds (except for family vacations), sk[place] r[place].      Symptom Behavior Details    Primary Symptoms Sporadic symptoms; Activity/position dependent, pain (Location:  see below, Quality: Aching/Throbbing), weakness  Left sided low back pain, denies central pain, denies right sided pain, denies buttock pain.    Worst Pain 3-4/10 (with see below)   Symptom Provocators Bending forward, heavy weights (carrying in front of her)     No longer hurts walk. Previously had pain with running and jumping (has not tested).   Never had night pain or sitting pain.    Best Pain 0/10    Symptom Relievers Activity moodification   Time of day dependent? No   Recent symptom change? symptoms improving     Prior Testing/Intervention for current  condition:  Prior Tests  x-ray and MRI   Prior Treatment PT at [facility] and Brace x 5 weeks, did not send to PT after getting out of the brace.      Lifestyle & General Medical History:  Employment 8th grade, [employer]    Usual physical activities  (within past year) See above   Orthopaedic history See Epic Chart   Notable medical history See Epic Chart   Patient Reported Health excellent           Presenting condition or subjective complaint:    Date of onset: 06/01/23    Relevant medical history:     Dates & types of surgery:      Prior diagnostic imaging/testing results:       Prior therapy history for the same diagnosis, illness or injury:        Living Environment  Social support:     Type of home:     Stairs to enter the home:         Ramp:     Stairs inside the home:         Help at home:    Equipment owned:       Employment:      Hobbies/Interests:      Patient goals for therapy:           Objective   PHYSICAL THERAPY SPINE EXAMINATION    Dynamic Movement Screen:  2 leg stance: equal weight bearing, decreased arch height on the left  2 leg squat:Normal    1 leg stance:   Right: excessive lateral trunk lean over stance limb  Left: excessive lateral trunk lean over stance limb    1 leg squat:   Right: proprioceptive challenge, excessive femoral IR/ADD, and excessive anterior knee excursion  Left: proprioceptive challenge, excessive femoral IR/ADD, and excessive anterior knee excursion    Gait: normal    Lumbar & Thoracic Spine ROM Screen   RIGHT LEFT   Standing Lumbar Spine ROM   Flexion Palms to the ground   Extension Minimal loss, no pain   Sidebend No loss No loss   Seated Thoracic Spine ROM   Rotation NT NT   Stork testing: negative     Sacroiliac Joint Provocative Testing: Negative distraction, compression     Passive Joint Mobility Screen: No pain with PA at the lumbar spine    Tender to palpation at the following structures: None  NOT tender to palpation at the following structures: QL,  paraspinals     SUSPECTED DIRECTIONAL PREFERENCE: N/A     Lower Extremity Flexibility Screen:   Right Left   Hamstring - -   RAFAEL - -   Goalie Stretch - -   Hip Flexor - -   ITB/Lat Hip in SL - -   Quadriceps - -   Gastroc/ Soleus - -   - = normal  + = mild tightness  ++ = moderate tightness  +++ = significant tightness    Lower Extremity Muscle Strength (x/5)   Right Left   Hip Flex 5/5 5/5   Hip ER 5/5 5/5   Hip IR 5/5 5/5   Hip ABD 5/5 5/5   Hip ADD 5/5 5/5   Hip Ext 5/5 5/5   Knee Flex 5/5 5/5     Core assessment: loss of core stability with marching bridge.   Biased towards posterior pelvic tilt.   With cueing, able to correct    Assessment & Plan   CLINICAL IMPRESSIONS  Medical Diagnosis: Low back stress fracture    Treatment Diagnosis: Low back pain, core weakness   Impression/Assessment: Patient is a 16 year old female with left low back complaints.  The following significant findings have been identified: Pain, Decreased strength, Impaired muscle performance, and Decreased activity tolerance. These impairments interfere with their ability to perform self care tasks and recreational activities as compared to previous level of function.     Clinical Decision Making (Complexity):  Clinical Presentation: Stable/Uncomplicated  Clinical Presentation Rationale: based on medical and personal factors listed in PT evaluation  Clinical Decision Making (Complexity): Low complexity    PLAN OF CARE  Treatment Interventions:  Interventions: Manual Therapy, Neuromuscular Re-education, Therapeutic Activity, Therapeutic Exercise    Long Term Goals     PT Goal 1  Goal Identifier: Return to impact  Goal Description: Patient will return to running, jumping and impact  Rationale: to maximize safety and independence with performance of ADLs and functional tasks  Target Date: 03/22/24      Frequency of Treatment: 1x/week  Duration of Treatment: 4 weeks, 2x/month for 2 months    Recommended Referrals to Other Professionals:  Physical Therapy  Education Assessment:   Learner/Method: Patient;No Barriers to Learning    Risks and benefits of evaluation/treatment have been explained.   Patient/Family/caregiver agrees with Plan of Care.     Evaluation Time:     PT Eval, Low Complexity Minutes (55127): 15     Signing Clinician: Linda Catalan PT           not examined

## 2023-12-29 ENCOUNTER — APPOINTMENT (OUTPATIENT)
Dept: RADIOLOGY | Facility: HOSPITAL | Age: 83
End: 2023-12-29
Payer: MEDICARE

## 2023-12-29 ENCOUNTER — OUTPATIENT (OUTPATIENT)
Dept: OUTPATIENT SERVICES | Facility: HOSPITAL | Age: 83
LOS: 1 days | End: 2023-12-29
Payer: MEDICARE

## 2023-12-29 DIAGNOSIS — Z98.890 OTHER SPECIFIED POSTPROCEDURAL STATES: Chronic | ICD-10-CM

## 2023-12-29 DIAGNOSIS — Z98.41 CATARACT EXTRACTION STATUS, RIGHT EYE: Chronic | ICD-10-CM

## 2023-12-29 DIAGNOSIS — Z86.018 PERSONAL HISTORY OF OTHER BENIGN NEOPLASM: Chronic | ICD-10-CM

## 2023-12-29 PROCEDURE — 77085 DXA BONE DENSITY AXL VRT FX: CPT | Mod: 26

## 2023-12-29 PROCEDURE — 77085 DXA BONE DENSITY AXL VRT FX: CPT

## 2024-01-08 NOTE — ASSESSMENT
[FreeTextEntry1] : Osteoporosis. She has a history of fragility fracture. She has a history of prior zoledronic acid therapy in 2018 and has received romosozumab from 2023 to present. 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 We have discussed the potential benefits and risks of romosozumab, including but not limited to osteonecrosis of the jaw, atypical femoral fracture, cardiovascular risk including heart attack and stroke. She is tolerating romosozumab and we will continue with plan to complete a 12 month course. We discussed the need for antiresorptive therapy after a course of osteoanabolic therapy so she does not lose the bone that was gained with treatment.\par Monitor calciotropic panel\par Continue romosozumab to complete a 12 month course\par Calcium 3182-9744 mg daily from diet and supplements (to be taken in divided doses as no more than 500-600 mg can be absorbed at one time)\par Adjust ergocalciferol to 50,000 intl units every two weeks pending level\par Diet, exercise and fall prevention discussed\par Physical therapy for balance and bone health\par \par Next bone density testin months\par Next appointment: 6 months or earlier as needed\par \par I reviewed the DXA performed on 2022 with the patient today.\par I reviewed the laboratories performed on 2022 with the patient today. \par I counseled the patient regarding calcium and vitamin D intake today.\par I discussed the following osteoporosis therapies: Romosozumab

## 2024-01-08 NOTE — ADDENDUM
[FreeTextEntry1] : Recent laboratory results as below. Serum calcium, renal function, and 25-hydroxyvitamin D within the normal range. PTH and total alkaline phosphatase elevated on romosozumab therapy and will monitor. I left a telephone message for Ms. Gibbons to discuss. 7/14/23  Recent bone density as below. There is osteoporosis by the World Health Organization criteria. While not directly comparable, there were apparent improvements at the lumbar spine and femoral neck from previous; the possible decline at the total hip may be due at least in part to positioning. Vertebral fracture assessment with evidence of known compression fracture at L1. I will discuss with Ms. Gibbons at her upcoming appointment. 1/08/24  Most recent bone mineral density Date: December 29, 2023 Source: [employer] Site: [facility]  Site	BMD	T-score	Change previous	Change baseline	 Lumbar spine	1.241	+2.4		+14.8%#	 Femoral neck	0.485	-3.3		+9.0%#	 Total hip           0.530	-3.4		-4.8%#	 Distal radius	0.344	-5.8			 DXA comments: #Dissimilar scan analysis; L3-L4 excluded due to spinal fusion; left hip values Vertebral fracture assessment with evidence of known compression fracture at L1  Impression: I have personally reviewed the DXA images and report, and I compared these images to a DXA dated October 20, 2022 from [facility]. There is osteoporosis by the World Health Organization criteria. While not directly comparable, there were apparent improvements at the lumbar spine and femoral neck from previous; the possible decline at the total hip may be due at least in part to positioning. Vertebral fracture assessment with evidence of known compression fracture at L1.

## 2024-01-08 NOTE — PHYSICAL EXAM
[Alert] : alert [Healthy Appearance] : healthy appearance [No Acute Distress] : no acute distress [Normal Sclera/Conjunctiva] : normal sclera/conjunctiva [Normal Hearing] : hearing was normal [No Respiratory Distress] : no respiratory distress [No Spinal Tenderness] : no spinal tenderness [No Stigmata of Cushings Syndrome] : no stigmata of Cushings Syndrome [Kyphosis] : no kyphosis present [Scoliosis] : no scoliosis [Acanthosis Nigricans] : no acanthosis nigricans [de-identified] : no moon facies, no supraclavicular fat pads [de-identified] : narrow-based gait with cane [de-identified] : + difficulty balancing on one leg bilaterally

## 2024-01-08 NOTE — HISTORY OF PRESENT ILLNESS
[FreeTextEntry1] : Ms. Gibbons is an 82 year-old woman presenting for follow-up of osteoporosis. I saw her for an initial visit in December 2022. \par \par Bone History\par Osteoporosis diagnosed a few years ago; most recent bone density as below\par Fracture history: Right wrist fracture in the thirties in a fall from standing height; left humerus fracture in 2018 in a fall from standing height; morphometric L1 vertebral fracture noted on vertebral fracture assessment with her first bone density test (may have occurred in a fall from a horse)\par Family history: No parental history of hip fracture\par Treatment: \par Zoledronic acid 5 mg IV in 2018\par Romosozumab 210 mg in January 2023, February 2023, March 2023, April 2023, May 2023, June 2023\par \par Falls: Fall leaving the theater\par Height loss: No\par Kidney stones: No\par Dental health: Regular appointments, history of implants, no upcoming procedures planned\par Exercise: Walks, plays ping pong twice a week\par Dairy intake: 0-2 servings daily (cheese a few times a week, occasional yogurt)\par Calcium supplements: 500 mg daily\par Multivitamin: None\par Vitamin D supplements: Ergocalciferol 50,000 intl units weekly\par \par Osteoporosis risk factors include: Postmenopausal status,  race, prior fracture, falls, height loss, small thin bones, tobacco use, excessive alcohol, anorexia, family history, vitamin D deficiency, corticosteroid use, seizure medications, malabsorption, hyperparathyroidism, hyperthyroidism.\par NEGATIVE EXCEPT: Postmenopausal status,  race, prior fracture\par \par Interim History \par Laboratory results from her initial visit as below. PTH elevated likely secondary to vitamin D deficiency. I recommended ergocalciferol 50,000 intl units weekly for three months, then every two weeks. Serum protein electrophoresis with faint band in gamma region; reflex immunofixation unremarkable. Urine protein electrophoresis without monoclonal protein. Other test results within range. \par She has received romosozumab 210 mg in January 2023, February 2023, March 2023, April 2023, May 2023, and June 2023. She is tolerating well. \par She had a recent fall without significant injury.\par Medical and surgical history, medications, allergies, social and family history reviewed and updated as needed.

## 2024-01-09 ENCOUNTER — APPOINTMENT (OUTPATIENT)
Dept: ENDOCRINOLOGY | Facility: CLINIC | Age: 84
End: 2024-01-09
Payer: MEDICARE

## 2024-01-09 VITALS
DIASTOLIC BLOOD PRESSURE: 73 MMHG | WEIGHT: 156 LBS | SYSTOLIC BLOOD PRESSURE: 144 MMHG | HEART RATE: 54 BPM | BODY MASS INDEX: 27.63 KG/M2

## 2024-01-09 DIAGNOSIS — M81.0 AGE-RELATED OSTEOPOROSIS W/OUT CURRENT PATHOLOGICAL FRACTURE: ICD-10-CM

## 2024-01-09 DIAGNOSIS — E21.3 HYPERPARATHYROIDISM, UNSPECIFIED: ICD-10-CM

## 2024-01-09 DIAGNOSIS — S32.019A UNSPECIFIED FRACTURE OF FIRST LUMBAR VERTEBRA, INITIAL ENCOUNTER FOR CLOSED FRACTURE: ICD-10-CM

## 2024-01-09 DIAGNOSIS — E55.9 VITAMIN D DEFICIENCY, UNSPECIFIED: ICD-10-CM

## 2024-01-09 PROCEDURE — 96372 THER/PROPH/DIAG INJ SC/IM: CPT

## 2024-01-09 PROCEDURE — 99214 OFFICE O/P EST MOD 30 MIN: CPT | Mod: 25

## 2024-01-09 RX ORDER — DENOSUMAB 60 MG/ML
60 INJECTION SUBCUTANEOUS
Qty: 1 | Refills: 0 | Status: COMPLETED | OUTPATIENT
Start: 2024-01-09

## 2024-01-09 RX ADMIN — DENOSUMAB 60 MG/ML: 60 INJECTION SUBCUTANEOUS at 00:00

## 2024-01-10 PROBLEM — E21.3 HYPERPARATHYROIDISM: Status: ACTIVE | Noted: 2024-01-10

## 2024-01-10 LAB
25(OH)D3 SERPL-MCNC: 35.6 NG/ML
ALBUMIN SERPL ELPH-MCNC: 4.1 G/DL
ALP BLD-CCNC: 94 U/L
ALT SERPL-CCNC: 18 U/L
ANION GAP SERPL CALC-SCNC: 11 MMOL/L
AST SERPL-CCNC: 21 U/L
BILIRUB SERPL-MCNC: 0.3 MG/DL
BUN SERPL-MCNC: 30 MG/DL
CALCIUM SERPL-MCNC: 8.9 MG/DL
CALCIUM SERPL-MCNC: 8.9 MG/DL
CHLORIDE SERPL-SCNC: 99 MMOL/L
CO2 SERPL-SCNC: 28 MMOL/L
CREAT SERPL-MCNC: 0.72 MG/DL
EGFR: 84 ML/MIN/1.73M2
GLUCOSE SERPL-MCNC: 86 MG/DL
MAGNESIUM SERPL-MCNC: 2.1 MG/DL
PARATHYROID HORMONE INTACT: 121 PG/ML
PHOSPHATE SERPL-MCNC: 3.4 MG/DL
POTASSIUM SERPL-SCNC: 3.9 MMOL/L
PROT SERPL-MCNC: 6.4 G/DL
SODIUM SERPL-SCNC: 138 MMOL/L

## 2024-01-10 RX ORDER — ERGOCALCIFEROL 1.25 MG/1
1.25 MG CAPSULE, LIQUID FILLED ORAL
Qty: 12 | Refills: 1 | Status: ACTIVE | COMMUNITY
Start: 2023-06-23 | End: 1900-01-01

## 2024-01-15 LAB — ALP BONE SERPL-MCNC: 21.9 UG/L

## 2024-02-09 ENCOUNTER — RX RENEWAL (OUTPATIENT)
Age: 84
End: 2024-02-09

## 2024-04-01 ENCOUNTER — RX RENEWAL (OUTPATIENT)
Age: 84
End: 2024-04-01

## 2024-04-04 NOTE — PHYSICAL EXAM
[Alert] : alert [Healthy Appearance] : healthy appearance [No Acute Distress] : no acute distress [Normal Sclera/Conjunctiva] : normal sclera/conjunctiva [Normal Hearing] : hearing was normal [No Respiratory Distress] : no respiratory distress [No Spinal Tenderness] : no spinal tenderness [No Stigmata of Cushings Syndrome] : no stigmata of Cushings Syndrome [Kyphosis] : no kyphosis present [Scoliosis] : no scoliosis [Acanthosis Nigricans] : no acanthosis nigricans [de-identified] : no moon facies, no supraclavicular fat pads [de-identified] : narrow-based gait with cane [de-identified] : + difficulty balancing on one leg bilaterally

## 2024-04-04 NOTE — ASSESSMENT
[FreeTextEntry1] : Osteoporosis. She has a history of fragility fracture. She has a history of prior zoledronic acid therapy in 2018 and romosozumab from January 2023 to December 2023. 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 We discussed the need for antiresorptive therapy after a course of osteoanabolic therapy so she does not lose the bone that was gained with treatment. We discussed the potential benefits and risks of antiresorptive osteoporosis therapy, including but not limited to osteonecrosis of the jaw and atypical femoral fracture. She is amenable to therapy with denosumab. We discussed that denosumab must be dosed every 6 months due to rebound increase in bone breakdown with abrupt discontinuation of therapy, with transition to bisphosphonate therapy prior to a "drug holiday."  Monitor calciotropic panel Start denosumab 60 mg SC every 6 months; dose administered today Calcium 5287-3443 mg daily from diet and supplements (to be taken in divided doses as no more than 500-600 mg can be absorbed at one time) Continue ergocalciferol to 50,000 intl units every two weeks pending level Diet, exercise and fall prevention discussed Physical therapy for balance and bone health  I reviewed the DXA performed on December 29, 2023 with the patient today. I reviewed the laboratories performed on July 13, 2023 with the patient today. I counseled the patient regarding calcium and vitamin D intake today. I discussed the following osteoporosis therapies: Denosumab, romosozumab

## 2024-04-04 NOTE — ADDENDUM
[FreeTextEntry1] : Procedure Note: Denosumab 60 mg SC administered into left upper arm. Radha Fernández MD  Recent laboratory results as below; discussed with Ms. Gibbons. PTH elevated with normal serum calcium as per previous. I recommended calcium 500 mg twice daily and ergocalciferol 50,000 intl units weekly. We will plan to monitor prior to her next dose of denosumab. 1/10/24  I spoke with her dentist, Dr. Jagjit Martinez, regarding a dental extraction and possible dental implant. She is aware of the risk of osteonecrosis of the jaw. There are no endocrine contraindications to a dental procedure if needed. 4/04/24

## 2024-04-04 NOTE — HISTORY OF PRESENT ILLNESS
[FreeTextEntry1] : Ms. Gibbons is an 83 year-old woman presenting for follow-up of osteoporosis. I saw her for an initial visit in December 2022 and last in July 2023.   Bone History Osteoporosis diagnosed a few years ago; most recent bone density as below Fracture history: Right wrist fracture in the thirties in a fall from standing height; left humerus fracture in 2018 in a fall from standing height; morphometric L1 vertebral fracture noted on vertebral fracture assessment with her first bone density test (may have occurred in a fall from a horse) Family history: No parental history of hip fracture Treatment:  Zoledronic acid 5 mg IV in 2018 Romosozumab 210 mg in January 2023, February 2023, March 2023, April 2023, May 2023, June 2023, July 2023, August 2023, September 2023, October 2023, November 2023, December 2023  Falls: None recent; she has participated in physical therapy for balance and bone health Height loss: No Kidney stones: No Dental health: Regular appointments, history of implants Exercise: Walks, plays ping pong twice a week Dairy intake: 0-2 servings daily (cheese a few times a week, occasional yogurt) Calcium supplements: 500 mg daily Multivitamin: None Vitamin D supplements: Ergocalciferol 50,000 intl units every two weeks  Osteoporosis risk factors include: Postmenopausal status,  race, prior fracture, falls, height loss, small thin bones, tobacco use, excessive alcohol, anorexia, family history, vitamin D deficiency, corticosteroid use, seizure medications, malabsorption, hyperparathyroidism, hyperthyroidism. NEGATIVE EXCEPT: Postmenopausal status,  race, prior fracture  Interim History  She received romosozumab from January to December 2023.  Laboratory results from last visit as below. Serum calcium, renal function, and 25-hydroxyvitamin D within the normal range. PTH and total alkaline phosphatase elevated on romosozumab therapy.  Recent bone density as below. There is osteoporosis by the World Health Organization criteria. While not directly comparable, there were apparent improvements at the lumbar spine and femoral neck from previous; the possible decline at the total hip may be due at least in part to positioning. Vertebral fracture assessment with evidence of known compression fracture at L1.  She may need an upcoming dental extraction.  Medical and surgical history, medications, allergies, social and family history reviewed and updated as needed.

## 2024-04-04 NOTE — DATA REVIEWED
[FreeTextEntry1] : Most recent bone mineral density Date: December 29, 2023 Source: [facility] Site: [facility]  Site	BMD	T-score	Change previous	Change baseline	 Lumbar spine	1.241	+2.4		+14.8%#	 Femoral neck	0.485	-3.3		+9.0%#	 Total hip           0.530	-3.4		-4.8%#	 Distal radius	0.344	-5.8			 DXA comments: #Dissimilar scan analysis; L3-L4 excluded due to spinal fusion; left hip values Vertebral fracture assessment with evidence of known compression fracture at L1  Impression: I have personally reviewed the DXA images and report, and I compared these images to a DXA dated October 20, 2022 from [facility]. There is osteoporosis by the World Health Organization criteria. While not directly comparable, there were apparent improvements at the lumbar spine and femoral neck from previous; the possible decline at the total hip may be due at least in part to positioning. Vertebral fracture assessment with evidence of known compression fracture at L1.

## 2024-04-07 ENCOUNTER — RX RENEWAL (OUTPATIENT)
Age: 84
End: 2024-04-07

## 2024-05-21 ENCOUNTER — NON-APPOINTMENT (OUTPATIENT)
Age: 84
End: 2024-05-21

## 2024-05-21 ENCOUNTER — APPOINTMENT (OUTPATIENT)
Dept: OPHTHALMOLOGY | Facility: CLINIC | Age: 84
End: 2024-05-21
Payer: MEDICARE

## 2024-05-21 PROCEDURE — 92014 COMPRE OPH EXAM EST PT 1/>: CPT

## 2024-05-23 ENCOUNTER — APPOINTMENT (OUTPATIENT)
Dept: ORTHOPEDIC SURGERY | Facility: CLINIC | Age: 84
End: 2024-05-23
Payer: MEDICARE

## 2024-05-23 VITALS
HEIGHT: 66 IN | DIASTOLIC BLOOD PRESSURE: 86 MMHG | HEART RATE: 75 BPM | SYSTOLIC BLOOD PRESSURE: 133 MMHG | BODY MASS INDEX: 27.32 KG/M2 | OXYGEN SATURATION: 95 % | WEIGHT: 170 LBS

## 2024-05-23 VITALS
SYSTOLIC BLOOD PRESSURE: 121 MMHG | HEART RATE: 66 BPM | OXYGEN SATURATION: 97 % | DIASTOLIC BLOOD PRESSURE: 81 MMHG | HEIGHT: 63 IN | BODY MASS INDEX: 26.4 KG/M2 | WEIGHT: 149 LBS

## 2024-05-23 DIAGNOSIS — Z98.890 OTHER SPECIFIED POSTPROCEDURAL STATES: ICD-10-CM

## 2024-05-23 PROCEDURE — 99202 OFFICE O/P NEW SF 15 MIN: CPT

## 2024-05-24 ENCOUNTER — RX RENEWAL (OUTPATIENT)
Age: 84
End: 2024-05-24

## 2024-05-24 RX ORDER — LOSARTAN POTASSIUM AND HYDROCHLOROTHIAZIDE 12.5; 5 MG/1; MG/1
50-12.5 TABLET ORAL DAILY
Qty: 30 | Refills: 0 | Status: ACTIVE | COMMUNITY
Start: 2021-07-12 | End: 1900-01-01

## 2024-05-24 NOTE — HISTORY OF PRESENT ILLNESS
[de-identified] : Marley is here with upper back and neck pain. She's questioning whether her large pendulous breasts are contributing. I do believe they are. She has strap cuts in her shoulder. She has pendulous, heavy, oversized breasts. She has thoracic back pain and neck pain despite prior surgery, including the anterior cervical discectomy and fusion performed by myself years ago.

## 2024-05-24 NOTE — END OF VISIT
[FreeTextEntry3] : I Maria Ines Rowley, acting as scribe, attest that this documentation has been prepared under the direction and in the presence of Provider Wily Mayo MD.   I was physically present during the service to the patient and/or personally examined the patient and I was directly involved in the management plan and recommendations of the care provided to the patient.   I Dr. Mayo, reviewed the history, the physical exam, and plan as documented by the PA. The documentation recorded by the PA, in my presence, accurately reflects the service I personally performed, and the decisions made by me with my edits as appropriate.  Wily Mayo MD  Documented by Natasha Naranjo acting as a scribe for Dr. Wily Mayo. 05/23/2024   All medical record entries made by the Scribe were at my, Dr. Wily Mayo. direction and personally dictated by me on 05/23/2024. I have reviewed the chart and agree that the record accurately reflects my personal performance of the history, physical exam, assessment and plan. I have also personally directed, reviewed, and agreed with the chart.

## 2024-05-24 NOTE — PLAN
Pt apts scheduled per provider request. Message sent to pt via pt portal to confirm.     ----- Message from Jhoana Henderson MD sent at 7/7/2022  7:50 PM CDT -----  Cbc, cmp, taty bolton b12 vv in 2 months me only     [TextEntry] : I concur that she would do well with breast reduction surgery. She had questions regarding her balance issues. This would require further work, including MRI's of the entire spine and consultation with a neurologist. At this point, she is not anxious to pursue this.

## 2024-05-29 ENCOUNTER — APPOINTMENT (OUTPATIENT)
Dept: OPHTHALMOLOGY | Facility: CLINIC | Age: 84
End: 2024-05-29
Payer: MEDICARE

## 2024-05-29 ENCOUNTER — NON-APPOINTMENT (OUTPATIENT)
Age: 84
End: 2024-05-29

## 2024-05-29 PROCEDURE — 66821 AFTER CATARACT LASER SURGERY: CPT | Mod: RT

## 2024-05-29 PROCEDURE — 92012 INTRM OPH EXAM EST PATIENT: CPT | Mod: 57

## 2024-06-05 ENCOUNTER — APPOINTMENT (OUTPATIENT)
Dept: OPHTHALMOLOGY | Facility: CLINIC | Age: 84
End: 2024-06-05

## 2024-06-12 ENCOUNTER — APPOINTMENT (OUTPATIENT)
Dept: OPHTHALMOLOGY | Facility: CLINIC | Age: 84
End: 2024-06-12
Payer: MEDICARE

## 2024-06-12 ENCOUNTER — NON-APPOINTMENT (OUTPATIENT)
Age: 84
End: 2024-06-12

## 2024-06-12 PROCEDURE — 99024 POSTOP FOLLOW-UP VISIT: CPT

## 2024-06-12 PROCEDURE — 66821 AFTER CATARACT LASER SURGERY: CPT | Mod: 78,LT

## 2024-07-02 ENCOUNTER — NON-APPOINTMENT (OUTPATIENT)
Age: 84
End: 2024-07-02

## 2024-07-02 ENCOUNTER — APPOINTMENT (OUTPATIENT)
Dept: OPHTHALMOLOGY | Facility: CLINIC | Age: 84
End: 2024-07-02
Payer: MEDICARE

## 2024-07-02 PROCEDURE — 99024 POSTOP FOLLOW-UP VISIT: CPT

## 2024-07-03 ENCOUNTER — APPOINTMENT (OUTPATIENT)
Dept: OPHTHALMOLOGY | Facility: CLINIC | Age: 84
End: 2024-07-03

## 2024-07-09 ENCOUNTER — APPOINTMENT (OUTPATIENT)
Dept: AFTER HOURS CARE | Facility: EMERGENCY ROOM | Age: 84
End: 2024-07-09
Payer: MEDICARE

## 2024-07-09 DIAGNOSIS — J02.9 ACUTE PHARYNGITIS, UNSPECIFIED: ICD-10-CM

## 2024-07-09 PROCEDURE — 99203 OFFICE O/P NEW LOW 30 MIN: CPT

## 2024-07-10 ENCOUNTER — RX RENEWAL (OUTPATIENT)
Age: 84
End: 2024-07-10

## 2024-07-16 ENCOUNTER — NON-APPOINTMENT (OUTPATIENT)
Age: 84
End: 2024-07-16

## 2024-07-16 ENCOUNTER — APPOINTMENT (OUTPATIENT)
Dept: ENDOCRINOLOGY | Facility: CLINIC | Age: 84
End: 2024-07-16

## 2024-07-16 ENCOUNTER — APPOINTMENT (OUTPATIENT)
Dept: OPHTHALMOLOGY | Facility: CLINIC | Age: 84
End: 2024-07-16
Payer: MEDICARE

## 2024-07-16 VITALS
HEART RATE: 69 BPM | WEIGHT: 150 LBS | SYSTOLIC BLOOD PRESSURE: 136 MMHG | BODY MASS INDEX: 26.57 KG/M2 | DIASTOLIC BLOOD PRESSURE: 73 MMHG

## 2024-07-16 DIAGNOSIS — E21.3 HYPERPARATHYROIDISM, UNSPECIFIED: ICD-10-CM

## 2024-07-16 DIAGNOSIS — M81.0 AGE-RELATED OSTEOPOROSIS W/OUT CURRENT PATHOLOGICAL FRACTURE: ICD-10-CM

## 2024-07-16 DIAGNOSIS — S32.019A UNSPECIFIED FRACTURE OF FIRST LUMBAR VERTEBRA, INITIAL ENCOUNTER FOR CLOSED FRACTURE: ICD-10-CM

## 2024-07-16 DIAGNOSIS — E55.9 VITAMIN D DEFICIENCY, UNSPECIFIED: ICD-10-CM

## 2024-07-16 PROCEDURE — 96372 THER/PROPH/DIAG INJ SC/IM: CPT

## 2024-07-16 PROCEDURE — 99024 POSTOP FOLLOW-UP VISIT: CPT

## 2024-07-16 PROCEDURE — 36415 COLL VENOUS BLD VENIPUNCTURE: CPT

## 2024-07-16 PROCEDURE — 99214 OFFICE O/P EST MOD 30 MIN: CPT | Mod: 25

## 2024-07-16 RX ORDER — DENOSUMAB 60 MG/ML
60 INJECTION SUBCUTANEOUS
Qty: 1 | Refills: 0 | Status: COMPLETED | OUTPATIENT
Start: 2024-07-16

## 2024-07-16 RX ORDER — SOFT LENS ADJUNCTIVE SOLUTIONS
DROPS MISCELLANEOUS
Refills: 0 | Status: ACTIVE | COMMUNITY

## 2024-07-16 RX ADMIN — DENOSUMAB 60 MG/ML: 60 INJECTION SUBCUTANEOUS at 00:00

## 2024-07-17 LAB
25(OH)D3 SERPL-MCNC: 59.9 NG/ML
ALBUMIN SERPL ELPH-MCNC: 3.8 G/DL
ALP BLD-CCNC: 68 U/L
ALT SERPL-CCNC: 12 U/L
ANION GAP SERPL CALC-SCNC: 10 MMOL/L
AST SERPL-CCNC: 18 U/L
BILIRUB SERPL-MCNC: 0.2 MG/DL
BUN SERPL-MCNC: 27 MG/DL
CALCIUM SERPL-MCNC: 9.1 MG/DL
CALCIUM SERPL-MCNC: 9.1 MG/DL
CHLORIDE SERPL-SCNC: 99 MMOL/L
CO2 SERPL-SCNC: 32 MMOL/L
CREAT SERPL-MCNC: 0.8 MG/DL
EGFR: 74 ML/MIN/1.73M2
GLUCOSE SERPL-MCNC: 94 MG/DL
MAGNESIUM SERPL-MCNC: 1.9 MG/DL
PARATHYROID HORMONE INTACT: 77 PG/ML
PHOSPHATE SERPL-MCNC: 3 MG/DL
POTASSIUM SERPL-SCNC: 4.2 MMOL/L
PROT SERPL-MCNC: 6.3 G/DL
SODIUM SERPL-SCNC: 141 MMOL/L

## 2024-07-19 LAB — ALP BONE SERPL-MCNC: 11 UG/L

## 2024-08-22 ENCOUNTER — RX RENEWAL (OUTPATIENT)
Age: 84
End: 2024-08-22

## 2024-08-28 ENCOUNTER — NON-APPOINTMENT (OUTPATIENT)
Age: 84
End: 2024-08-28

## 2024-08-29 ENCOUNTER — RX RENEWAL (OUTPATIENT)
Age: 84
End: 2024-08-29

## 2024-09-24 DIAGNOSIS — B35.1 TINEA UNGUIUM: ICD-10-CM

## 2024-10-03 RX ORDER — TERBINAFINE HYDROCHLORIDE 1 G/100G
1 CREAM TOPICAL 3 TIMES DAILY
Qty: 1 | Refills: 1 | Status: ACTIVE | COMMUNITY
Start: 2024-10-03 | End: 1900-01-01

## 2024-12-20 ENCOUNTER — APPOINTMENT (OUTPATIENT)
Dept: INTERNAL MEDICINE | Facility: CLINIC | Age: 84
End: 2024-12-20
Payer: MEDICARE

## 2024-12-20 ENCOUNTER — NON-APPOINTMENT (OUTPATIENT)
Age: 84
End: 2024-12-20

## 2024-12-20 VITALS — DIASTOLIC BLOOD PRESSURE: 80 MMHG | SYSTOLIC BLOOD PRESSURE: 140 MMHG

## 2024-12-20 VITALS
BODY MASS INDEX: 26.75 KG/M2 | HEART RATE: 50 BPM | WEIGHT: 151 LBS | SYSTOLIC BLOOD PRESSURE: 143 MMHG | HEIGHT: 63 IN | OXYGEN SATURATION: 97 % | DIASTOLIC BLOOD PRESSURE: 65 MMHG

## 2024-12-20 DIAGNOSIS — D32.9 BENIGN NEOPLASM OF MENINGES, UNSPECIFIED: ICD-10-CM

## 2024-12-20 DIAGNOSIS — M81.0 AGE-RELATED OSTEOPOROSIS W/OUT CURRENT PATHOLOGICAL FRACTURE: ICD-10-CM

## 2024-12-20 DIAGNOSIS — G47.33 OBSTRUCTIVE SLEEP APNEA (ADULT) (PEDIATRIC): ICD-10-CM

## 2024-12-20 DIAGNOSIS — I10 ESSENTIAL (PRIMARY) HYPERTENSION: ICD-10-CM

## 2024-12-20 DIAGNOSIS — Z00.00 ENCOUNTER FOR GENERAL ADULT MEDICAL EXAMINATION W/OUT ABNORMAL FINDINGS: ICD-10-CM

## 2024-12-20 DIAGNOSIS — B35.1 TINEA UNGUIUM: ICD-10-CM

## 2024-12-20 DIAGNOSIS — Z01.818 ENCOUNTER FOR OTHER PREPROCEDURAL EXAMINATION: ICD-10-CM

## 2024-12-20 PROCEDURE — G0439: CPT

## 2024-12-20 PROCEDURE — 36415 COLL VENOUS BLD VENIPUNCTURE: CPT

## 2024-12-20 RX ORDER — TERBINAFINE HYDROCHLORIDE 1 G/100G
1 CREAM TOPICAL 3 TIMES DAILY
Qty: 1 | Refills: 1 | Status: ACTIVE | COMMUNITY
Start: 2024-12-20 | End: 1900-01-01

## 2024-12-24 LAB
25(OH)D3 SERPL-MCNC: 56.2 NG/ML
ALBUMIN SERPL ELPH-MCNC: 3.8 G/DL
ALP BLD-CCNC: 59 U/L
ALT SERPL-CCNC: 15 U/L
ANION GAP SERPL CALC-SCNC: 12 MMOL/L
APPEARANCE: CLEAR
APTT BLD: 30.4 SEC
AST SERPL-CCNC: 21 U/L
BACTERIA: NEGATIVE /HPF
BILIRUB SERPL-MCNC: 0.4 MG/DL
BILIRUBIN URINE: NEGATIVE
BLOOD URINE: NEGATIVE
BUN SERPL-MCNC: 25 MG/DL
CALCIUM SERPL-MCNC: 8.7 MG/DL
CAST: 0 /LPF
CHLORIDE SERPL-SCNC: 102 MMOL/L
CHOLEST SERPL-MCNC: 160 MG/DL
CO2 SERPL-SCNC: 27 MMOL/L
COLOR: YELLOW
CREAT SERPL-MCNC: 0.79 MG/DL
EGFR: 75 ML/MIN/1.73M2
EPITHELIAL CELLS: 1 /HPF
ESTIMATED AVERAGE GLUCOSE: 123 MG/DL
FERRITIN SERPL-MCNC: 142 NG/ML
GLUCOSE QUALITATIVE U: NEGATIVE MG/DL
GLUCOSE SERPL-MCNC: 70 MG/DL
HBA1C MFR BLD HPLC: 5.9 %
HCT VFR BLD CALC: 33.1 %
HDLC SERPL-MCNC: 93 MG/DL
HGB BLD-MCNC: 10.8 G/DL
INR PPP: 0.89 RATIO
IRON SATN MFR SERPL: 34 %
IRON SERPL-MCNC: 106 UG/DL
KETONES URINE: NEGATIVE MG/DL
LDLC SERPL CALC-MCNC: 55 MG/DL
LEUKOCYTE ESTERASE URINE: ABNORMAL
MCHC RBC-ENTMCNC: 31.4 PG
MCHC RBC-ENTMCNC: 32.6 G/DL
MCV RBC AUTO: 96.2 FL
MICROSCOPIC-UA: NORMAL
NITRITE URINE: NEGATIVE
NONHDLC SERPL-MCNC: 67 MG/DL
PH URINE: 6.5
PLATELET # BLD AUTO: 212 K/UL
POTASSIUM SERPL-SCNC: 4.5 MMOL/L
PROT SERPL-MCNC: 6.2 G/DL
PROTEIN URINE: NORMAL MG/DL
PT BLD: 10.5 SEC
RBC # BLD: 3.44 M/UL
RBC # FLD: 13.7 %
RED BLOOD CELLS URINE: 1 /HPF
SODIUM SERPL-SCNC: 141 MMOL/L
SPECIFIC GRAVITY URINE: 1.03
T4 FREE SERPL-MCNC: 1.2 NG/DL
TIBC SERPL-MCNC: 310 UG/DL
TRIGL SERPL-MCNC: 58 MG/DL
TSH SERPL-ACNC: 1.1 UIU/ML
UIBC SERPL-MCNC: 204 UG/DL
UROBILINOGEN URINE: 1 MG/DL
WBC # FLD AUTO: 5.32 K/UL
WHITE BLOOD CELLS URINE: 0 /HPF

## 2024-12-24 NOTE — ASU PATIENT PROFILE, ADULT - ABLE TO REACH PT
Patient advised that the stress and nuclear portions of his test were Negative for ischemia.  Patient should follow up with his PCP.  Patient verbalized understanding.   yes

## 2025-02-14 ENCOUNTER — APPOINTMENT (OUTPATIENT)
Dept: ENDOCRINOLOGY | Facility: CLINIC | Age: 85
End: 2025-02-14
Payer: MEDICARE

## 2025-02-14 ENCOUNTER — OUTPATIENT (OUTPATIENT)
Dept: OUTPATIENT SERVICES | Facility: HOSPITAL | Age: 85
LOS: 1 days | End: 2025-02-14
Payer: MEDICARE

## 2025-02-14 ENCOUNTER — APPOINTMENT (OUTPATIENT)
Dept: INFUSION THERAPY | Facility: CLINIC | Age: 85
End: 2025-02-14

## 2025-02-14 VITALS
SYSTOLIC BLOOD PRESSURE: 140 MMHG | BODY MASS INDEX: 26.75 KG/M2 | HEART RATE: 52 BPM | DIASTOLIC BLOOD PRESSURE: 75 MMHG | WEIGHT: 151 LBS

## 2025-02-14 VITALS
SYSTOLIC BLOOD PRESSURE: 92 MMHG | WEIGHT: 141.1 LBS | TEMPERATURE: 100 F | HEIGHT: 61 IN | DIASTOLIC BLOOD PRESSURE: 54 MMHG | OXYGEN SATURATION: 96 % | RESPIRATION RATE: 17 BRPM | HEART RATE: 75 BPM

## 2025-02-14 DIAGNOSIS — E21.3 HYPERPARATHYROIDISM, UNSPECIFIED: ICD-10-CM

## 2025-02-14 DIAGNOSIS — Z86.018 PERSONAL HISTORY OF OTHER BENIGN NEOPLASM: Chronic | ICD-10-CM

## 2025-02-14 DIAGNOSIS — M81.0 AGE-RELATED OSTEOPOROSIS W/OUT CURRENT PATHOLOGICAL FRACTURE: ICD-10-CM

## 2025-02-14 DIAGNOSIS — Z98.890 OTHER SPECIFIED POSTPROCEDURAL STATES: Chronic | ICD-10-CM

## 2025-02-14 DIAGNOSIS — Z98.41 CATARACT EXTRACTION STATUS, RIGHT EYE: Chronic | ICD-10-CM

## 2025-02-14 DIAGNOSIS — M81.0 AGE-RELATED OSTEOPOROSIS WITHOUT CURRENT PATHOLOGICAL FRACTURE: ICD-10-CM

## 2025-02-14 PROCEDURE — 99213 OFFICE O/P EST LOW 20 MIN: CPT

## 2025-02-14 PROCEDURE — 96372 THER/PROPH/DIAG INJ SC/IM: CPT

## 2025-02-14 RX ORDER — DENOSUMAB 120 MG/1.7ML
60 INJECTION SUBCUTANEOUS ONCE
Refills: 0 | Status: COMPLETED | OUTPATIENT
Start: 2025-02-14 | End: 2025-02-14

## 2025-02-14 RX ADMIN — DENOSUMAB 60 MILLIGRAM(S): 120 INJECTION SUBCUTANEOUS at 14:24

## 2025-02-14 NOTE — PHARMACY COMMUNICATION NOTE - COMMENTS
We Emailed Dr. Fernández the following:  	Marley Gibbons (: 1940) Corrected calcium = 8.86mg/dL and eGFR 75 from 2024. Is it ok to treat using 2024 CMP?    Ok to treat Shai,  with existing results.

## 2025-02-14 NOTE — PHARMACY COMMUNICATION NOTE - REASON FOR NOTE
12/2024 CMP for Prolia Treatment Continue Regimen: Fabior 0.1% foam Detail Level: Zone Render In Strict Bullet Format?: No Continue Regimen: Sulfac\\nHydrocortisone prn

## 2025-02-28 NOTE — DISCHARGE NOTE ADULT - NS AS DC STROKE DX YN
[facility] Call Center    Phone Message    May a detailed message be left on voicemail: yes     Reason for Call: Other: Daughter, Gina called back and confirmed pt does NOT have a pacemaker or implanted cardiac device. Pt has leads temporarily placed on him after procedure but nothing implanted.      Action Taken: Message routed to:  Adult Clinics: Cardiology p [id number]    Travel Screening: Not Applicable     Date of Service:                                                                      no

## 2025-03-14 ENCOUNTER — APPOINTMENT (OUTPATIENT)
Dept: INTERNAL MEDICINE | Facility: CLINIC | Age: 85
End: 2025-03-14
Payer: MEDICARE

## 2025-03-14 VITALS
SYSTOLIC BLOOD PRESSURE: 127 MMHG | WEIGHT: 143 LBS | BODY MASS INDEX: 28.07 KG/M2 | OXYGEN SATURATION: 95 % | HEART RATE: 70 BPM | DIASTOLIC BLOOD PRESSURE: 61 MMHG | HEIGHT: 60 IN | TEMPERATURE: 97.3 F

## 2025-03-14 DIAGNOSIS — I10 ESSENTIAL (PRIMARY) HYPERTENSION: ICD-10-CM

## 2025-03-14 DIAGNOSIS — G47.33 OBSTRUCTIVE SLEEP APNEA (ADULT) (PEDIATRIC): ICD-10-CM

## 2025-03-14 DIAGNOSIS — Z98.890 OTHER SPECIFIED POSTPROCEDURAL STATES: ICD-10-CM

## 2025-03-14 DIAGNOSIS — E21.3 HYPERPARATHYROIDISM, UNSPECIFIED: ICD-10-CM

## 2025-03-14 PROCEDURE — 99213 OFFICE O/P EST LOW 20 MIN: CPT | Mod: 25

## 2025-03-14 PROCEDURE — 36415 COLL VENOUS BLD VENIPUNCTURE: CPT

## 2025-03-19 LAB — LEVETIRACETAM SERPL-MCNC: 47.1 UG/ML

## 2025-03-19 RX ORDER — LEVETIRACETAM 500 MG/1
500 TABLET, FILM COATED ORAL TWICE DAILY
Qty: 60 | Refills: 3 | Status: ACTIVE | COMMUNITY
Start: 2025-03-19 | End: 1900-01-01

## 2025-05-07 ENCOUNTER — RX RENEWAL (OUTPATIENT)
Age: 85
End: 2025-05-07

## 2025-05-23 ENCOUNTER — RX RENEWAL (OUTPATIENT)
Age: 85
End: 2025-05-23

## 2025-06-19 LAB
25(OH)D3 SERPL-MCNC: 57 NG/ML
ALBUMIN SERPL ELPH-MCNC: 4.1 G/DL
ALP BLD-CCNC: 75 U/L
ALT SERPL-CCNC: 26 U/L
ANION GAP SERPL CALC-SCNC: 13 MMOL/L
AST SERPL-CCNC: 28 U/L
BILIRUB SERPL-MCNC: 0.3 MG/DL
BUN SERPL-MCNC: 26 MG/DL
CALCIUM SERPL-MCNC: 9.1 MG/DL
CALCIUM SERPL-MCNC: 9.1 MG/DL
CHLORIDE SERPL-SCNC: 103 MMOL/L
CO2 SERPL-SCNC: 25 MMOL/L
CREAT SERPL-MCNC: 0.71 MG/DL
EGFRCR SERPLBLD CKD-EPI 2021: 85 ML/MIN/1.73M2
GLUCOSE SERPL-MCNC: 87 MG/DL
MAGNESIUM SERPL-MCNC: 1.9 MG/DL
PARATHYROID HORMONE INTACT: 85 PG/ML
PHOSPHATE SERPL-MCNC: 3.8 MG/DL
POTASSIUM SERPL-SCNC: 4.3 MMOL/L
PROT SERPL-MCNC: 6.5 G/DL
SODIUM SERPL-SCNC: 141 MMOL/L

## 2025-08-15 ENCOUNTER — OUTPATIENT (OUTPATIENT)
Dept: OUTPATIENT SERVICES | Facility: HOSPITAL | Age: 85
LOS: 1 days | End: 2025-08-15
Payer: MEDICARE

## 2025-08-15 ENCOUNTER — APPOINTMENT (OUTPATIENT)
Dept: INFUSION THERAPY | Facility: CLINIC | Age: 85
End: 2025-08-15

## 2025-08-15 VITALS
OXYGEN SATURATION: 98 % | RESPIRATION RATE: 17 BRPM | TEMPERATURE: 98 F | HEIGHT: 61 IN | WEIGHT: 145.06 LBS | SYSTOLIC BLOOD PRESSURE: 126 MMHG | DIASTOLIC BLOOD PRESSURE: 71 MMHG | HEART RATE: 52 BPM

## 2025-08-15 DIAGNOSIS — Z98.41 CATARACT EXTRACTION STATUS, RIGHT EYE: Chronic | ICD-10-CM

## 2025-08-15 DIAGNOSIS — Z98.890 OTHER SPECIFIED POSTPROCEDURAL STATES: Chronic | ICD-10-CM

## 2025-08-15 DIAGNOSIS — Z86.018 PERSONAL HISTORY OF OTHER BENIGN NEOPLASM: Chronic | ICD-10-CM

## 2025-08-15 DIAGNOSIS — M81.0 AGE-RELATED OSTEOPOROSIS WITHOUT CURRENT PATHOLOGICAL FRACTURE: ICD-10-CM

## 2025-08-15 PROCEDURE — 96372 THER/PROPH/DIAG INJ SC/IM: CPT

## 2025-08-15 RX ORDER — DENOSUMAB 60 MG/ML
60 INJECTION SUBCUTANEOUS ONCE
Refills: 0 | Status: COMPLETED | OUTPATIENT
Start: 2025-08-15 | End: 2025-08-15

## 2025-08-15 RX ADMIN — DENOSUMAB 60 MILLIGRAM(S): 60 INJECTION SUBCUTANEOUS at 16:54

## (undated) DEVICE — PACK ANTERIOR SEGMENT

## (undated) DEVICE — DRAPE MICROSCOPE KNOB COVER SMALL (2 PCS)

## (undated) DEVICE — KNIFE ALCON PARACENTESIS CLEARCUT SIDEPORT 1.2MM (YELLOW)

## (undated) DEVICE — CAPSULE RETRACTOR MST

## (undated) DEVICE — SOL IRR BAG BSS 500ML

## (undated) DEVICE — GLV 7.5 PROTEXIS (WHITE)

## (undated) DEVICE — VENODYNE/SCD SLEEVE CALF MEDIUM

## (undated) DEVICE — CAPSULE GUARD I/A

## (undated) DEVICE — WARMING BLANKET LOWER ADULT

## (undated) DEVICE — PACK CENTURION 2.4MM

## (undated) DEVICE — SOL IRR BAL SALT 500ML

## (undated) DEVICE — SUT NYLON 10-0 12" CU-5

## (undated) DEVICE — KNIFE ALCON PARACENTESIS CLEARCUT SIDEPORT 1MM (YELLOW)

## (undated) DEVICE — SOL BALANCE SALT 15ML